# Patient Record
Sex: MALE | Race: WHITE | NOT HISPANIC OR LATINO | Employment: OTHER | URBAN - NONMETROPOLITAN AREA
[De-identification: names, ages, dates, MRNs, and addresses within clinical notes are randomized per-mention and may not be internally consistent; named-entity substitution may affect disease eponyms.]

---

## 2018-02-20 ENCOUNTER — TRANSCRIBE ORDERS (OUTPATIENT)
Dept: ADMINISTRATIVE | Facility: HOSPITAL | Age: 63
End: 2018-02-20

## 2018-02-20 DIAGNOSIS — R07.9 CHEST PAIN, UNSPECIFIED TYPE: Primary | ICD-10-CM

## 2018-02-21 ENCOUNTER — HOSPITAL ENCOUNTER (OUTPATIENT)
Dept: CARDIOLOGY | Facility: HOSPITAL | Age: 63
Discharge: HOME | End: 2018-02-21
Admitting: NURSE PRACTITIONER

## 2018-02-21 VITALS
HEIGHT: 68 IN | DIASTOLIC BLOOD PRESSURE: 69 MMHG | HEART RATE: 46 BPM | BODY MASS INDEX: 28.79 KG/M2 | SYSTOLIC BLOOD PRESSURE: 169 MMHG | WEIGHT: 190 LBS

## 2018-02-21 DIAGNOSIS — R07.9 CHEST PAIN, UNSPECIFIED TYPE: ICD-10-CM

## 2018-02-21 PROCEDURE — 93017 CV STRESS TEST TRACING ONLY: CPT

## 2018-02-21 PROCEDURE — 93018 CV STRESS TEST I&R ONLY: CPT | Performed by: INTERNAL MEDICINE

## 2018-02-21 RX ORDER — HYDROCODONE BITARTRATE AND ACETAMINOPHEN 5; 325 MG/1; MG/1
1 TABLET ORAL EVERY 6 HOURS PRN
COMMUNITY

## 2018-02-21 RX ORDER — OLMESARTAN MEDOXOMIL 20 MG/1
20 TABLET ORAL DAILY
COMMUNITY

## 2018-02-21 RX ORDER — SIMVASTATIN 20 MG
20 TABLET ORAL NIGHTLY
COMMUNITY

## 2018-02-21 RX ORDER — OMEPRAZOLE 40 MG/1
40 CAPSULE, DELAYED RELEASE ORAL DAILY
COMMUNITY

## 2018-02-22 LAB
BH CV STRESS BP STAGE 1: NORMAL
BH CV STRESS BP STAGE 2: NORMAL
BH CV STRESS DURATION MIN STAGE 1: 3
BH CV STRESS DURATION MIN STAGE 2: 3
BH CV STRESS DURATION MIN STAGE 3: 0
BH CV STRESS DURATION SEC STAGE 1: 0
BH CV STRESS DURATION SEC STAGE 2: 0
BH CV STRESS DURATION SEC STAGE 3: 20
BH CV STRESS GRADE STAGE 1: 10
BH CV STRESS GRADE STAGE 2: 12
BH CV STRESS GRADE STAGE 3: 14
BH CV STRESS HR STAGE 1: 80
BH CV STRESS HR STAGE 2: 97
BH CV STRESS HR STAGE 3: 108
BH CV STRESS METS STAGE 1: 5
BH CV STRESS METS STAGE 2: 7.5
BH CV STRESS METS STAGE 3: 10
BH CV STRESS PROTOCOL 1: NORMAL
BH CV STRESS RECOVERY BP: NORMAL MMHG
BH CV STRESS RECOVERY HR: 53 BPM
BH CV STRESS SPEED STAGE 1: 1.7
BH CV STRESS SPEED STAGE 2: 2.5
BH CV STRESS SPEED STAGE 3: 3.4
BH CV STRESS STAGE 1: 1
BH CV STRESS STAGE 2: 2
BH CV STRESS STAGE 3: 3
MAXIMAL PREDICTED HEART RATE: 156 BPM
PERCENT MAX PREDICTED HR: 69.23 %
STRESS BASELINE BP: NORMAL MMHG
STRESS BASELINE HR: 46 BPM
STRESS PERCENT HR: 81 %
STRESS POST ESTIMATED WORKLOAD: 10 METS
STRESS POST EXERCISE DUR MIN: 6 MIN
STRESS POST EXERCISE DUR SEC: 20 SEC
STRESS POST PEAK BP: NORMAL MMHG
STRESS POST PEAK HR: 108 BPM
STRESS TARGET HR: 133 BPM

## 2018-05-17 ENCOUNTER — OFFICE VISIT (OUTPATIENT)
Dept: GASTROENTEROLOGY | Facility: CLINIC | Age: 63
End: 2018-05-17

## 2018-05-17 VITALS
DIASTOLIC BLOOD PRESSURE: 80 MMHG | WEIGHT: 194 LBS | OXYGEN SATURATION: 99 % | HEART RATE: 53 BPM | SYSTOLIC BLOOD PRESSURE: 128 MMHG | BODY MASS INDEX: 29.4 KG/M2 | HEIGHT: 68 IN

## 2018-05-17 DIAGNOSIS — I10 HTN (HYPERTENSION), BENIGN: ICD-10-CM

## 2018-05-17 DIAGNOSIS — Z86.010 HX OF ADENOMATOUS COLONIC POLYPS: Primary | ICD-10-CM

## 2018-05-17 PROCEDURE — S0285 CNSLT BEFORE SCREEN COLONOSC: HCPCS | Performed by: CLINICAL NURSE SPECIALIST

## 2018-05-17 RX ORDER — ALPRAZOLAM 0.25 MG/1
TABLET ORAL
Refills: 0 | COMMUNITY
Start: 2018-04-19 | End: 2019-10-15 | Stop reason: ALTCHOICE

## 2018-05-17 RX ORDER — SODIUM, POTASSIUM,MAG SULFATES 17.5-3.13G
SOLUTION, RECONSTITUTED, ORAL ORAL
Qty: 2 BOTTLE | Refills: 0 | Status: SHIPPED | OUTPATIENT
Start: 2018-05-17 | End: 2019-10-15 | Stop reason: ALTCHOICE

## 2018-05-17 NOTE — PROGRESS NOTES
Ghulam Artis  1955      5/17/2018  Chief Complaint   Patient presents with   • Colonoscopy     Subjective   HPI  Ghulam Artis is a 62 y.o. male who presents as a referral for preventative maintenance. He has no complaints of nausea or vomiting. No change in bowels. No wt loss. No BRBPR. No melena. There is NO family hx for colon cancer. No abdominal pain.  Past Medical History:   Diagnosis Date   • Hyperlipidemia    • Hypertension      Past Surgical History:   Procedure Laterality Date   • COLONOSCOPY W/ POLYPECTOMY  05/13/2013    TUBULAR ADENOMA POLYP AT 60 CM, HYPERPLASTIC POLYP AT 30 CM, DIVERTICULOSIS SIGMOID COLON, RECALL 5YRS   • COLONOSCOPY W/ POLYPECTOMY  02/26/2008    HYPERPLASTIC POLYPS RECTUM, SCATTERED DIVERTICULA     Outpatient Prescriptions Marked as Taking for the 5/17/18 encounter (Office Visit) with LUDIVINA Christianson   Medication Sig Dispense Refill   • ALPRAZolam (XANAX) 0.25 MG tablet 1 TABLET BY MOUTH TWICE A DAY AS NEEDED FOR ANXIETY  0   • HYDROcodone-acetaminophen (NORCO) 5-325 MG per tablet Take 1 tablet by mouth Every 6 (Six) Hours As Needed.     • olmesartan (BENICAR) 20 MG tablet Take 20 mg by mouth Daily.     • omeprazole (priLOSEC) 40 MG capsule Take 40 mg by mouth Daily.     • simvastatin (ZOCOR) 20 MG tablet Take 20 mg by mouth Every Night.       No Known Allergies  Social History     Social History   • Marital status:      Spouse name: N/A   • Number of children: N/A   • Years of education: N/A     Occupational History   • Not on file.     Social History Main Topics   • Smoking status: Current Every Day Smoker     Packs/day: 2.00     Types: Cigarettes   • Smokeless tobacco: Never Used   • Alcohol use Yes      Comment: Occasional   • Drug use: Unknown   • Sexual activity: Not on file     Other Topics Concern   • Not on file     Social History Narrative   • No narrative on file     Family History   Problem Relation Age of Onset   • Heart disease Maternal  "Grandfather    • Heart attack Maternal Grandfather    • Colon cancer Neg Hx    • GI problems Neg Hx    • Colon polyps Neg Hx      Health Maintenance   Topic Date Due   • ANNUAL PHYSICAL  09/15/1958   • PNEUMOCOCCAL VACCINE (19-64 MEDIUM RISK) (1 of 1 - PPSV23) 09/15/1972   • TDAP/TD VACCINES (1 - Tdap) 09/15/1972   • HEPATITIS C SCREENING  02/07/2018   • ZOSTER VACCINE  02/07/2018   • LIPID PANEL  03/13/2018   • INFLUENZA VACCINE  08/01/2018   • COLONOSCOPY  05/13/2023       REVIEW OF SYSTEMS  General: well appearing, no fever chills or sweats, no unexplained wt loss  HEENT: no acute visual or hearing disturbances  Cardiovascular: No chest pain or palpitations  Pulmonary: No shortness of breath, coughing, wheezing or hemoptysis  : No burning, urgency, hematuria, or dysuria  Musculoskeletal: No joint pain or stiffness  Peripheral: no edema  Skin: No lesions or rashes  Neuro: No dizziness, headaches, stroke, syncope  Endocrine: No hot or cold intolerances  Hematological: No blood dyscrasias    Objective   Vitals:    05/17/18 0840   BP: 128/80   Pulse: 53   SpO2: 99%   Weight: 88 kg (194 lb)   Height: 172.7 cm (68\")     Body mass index is 29.5 kg/m².  Patient's Body mass index is 29.5 kg/m². BMI is above normal parameters. Recommendations include: nutrition counseling.      PHYSICAL EXAM  General: age appropriate well nourished well appearing, no acute distress  Head: normocephalic and atraumatic  Global assessment-supple  Neck-No JVD noted, no lymphadenopathy  Pulmonary-clear to auscultation bilaterally, normal respiratory effort  Cardiovascular-normal rate and rhythm, normal heart sounds, S1 and S2 noted  Abdomen-soft, non tender, non distended, normal bowel sounds all 4 quadrants, no hepatosplenomegaly noted  Extremities-No clubbing cyanosis or edema  Neuro-Non focal, converses appropriately, awake, alert, oriented    Assessment/Plan     Ghulam was seen today for colonoscopy.    Diagnoses and all orders for this " visit:    Hx of adenomatous colonic polyps  -     SUPREP BOWEL PREP KIT 17.5-3.13-1.6 GM/180ML solution oral solution; Take as directed by office instructions provided  -     Case Request; Standing  -     Implement Anesthesia Orders Day of Procedure; Standing  -     Obtain Informed Consent; Standing  -     Verify bowel prep was successful; Standing  -     Case Request    HTN (hypertension), benign  Comments:  Cont Benicar the am of procedure        COLONOSCOPY WITH ANESTHESIA (N/A)  Body mass index is 29.5 kg/m².    Patient instructions on prep prior to procedure provided to the patient.    All risks, benefits, alternatives, and indications of colonoscopy procedure have been discussed with the patient. Risks to include perforation of the colon requiring possible surgery or colostomy, risk of bleeding from biopsies or removal of colon tissue, possibility of missing a colon polyp or cancer, or adverse drug reaction.  Benefits to include the diagnosis and management of disease of the colon and rectum. Alternatives to include barium enema, radiographic evaluation, lab testing or no intervention. Pt verbalizes understanding and agrees.     Maria D Ludwig, APRN  2018  8:49 AM      IF YOU SMOKE OR USE TOBACCO PLEASE READ THE FOLLOWIN minutes reading provided    Why is smoking bad for me?  Smoking increases the risk of heart disease, lung disease, vascular disease, stroke, and cancer.     If you smoke, STOP!    If you would like more information on quitting smoking, please visit the [employer] website: [email or web address]   This link will provide additional resources including the QUIT line and the Beat the Pack support groups.     For more information:    Quit Now [place]  -QUIT-NOW  [email or web address]/en-[email or web address]    Obesity, Adult  Obesity is the condition of having too much total body fat. Being overweight or obese means that your weight is  greater than what is considered healthy for your body size. Obesity is determined by a measurement called BMI. BMI is an estimate of body fat and is calculated from height and weight. For adults, a BMI of 30 or higher is considered obese.  Obesity can eventually lead to other health concerns and major illnesses, including:  · Stroke.  · Coronary artery disease (CAD).  · Type 2 diabetes.  · Some types of cancer, including cancers of the colon, breast, uterus, and gallbladder.  · Osteoarthritis.  · High blood pressure (hypertension).  · High cholesterol.  · Sleep apnea.  · Gallbladder stones.  · Infertility problems.  What are the causes?  The main cause of obesity is taking in (consuming) more calories than your body uses for energy. Other factors that contribute to this condition may include:  · Being born with genes that make you more likely to become obese.  · Having a medical condition that causes obesity. These conditions include:  ¨ Hypothyroidism.  ¨ Polycystic ovarian syndrome (PCOS).  ¨ Binge-eating disorder.  ¨ Cushing syndrome.  · Taking certain medicines, such as steroids, antidepressants, and seizure medicines.  · Not being physically active (sedentary lifestyle).  · Living where there are limited places to exercise safely or buy healthy foods.  · Not getting enough sleep.  What increases the risk?  The following factors may increase your risk of this condition:  · Having a family history of obesity.  · Being a woman of -American descent.  · Being a man of  descent.  What are the signs or symptoms?  Having excessive body fat is the main symptom of this condition.  How is this diagnosed?  This condition may be diagnosed based on:  · Your symptoms.  · Your medical history.  · A physical exam. Your health care provider may measure:  ¨ Your BMI. If you are an adult with a BMI between 25 and less than 30, you are considered overweight. If you are an adult with a BMI of 30 or higher, you are  considered obese.  ¨ The distances around your hips and your waist (circumferences). These may be compared to each other to help diagnose your condition.  ¨ Your skinfold thickness. Your health care provider may gently pinch a fold of your skin and measure it.  How is this treated?  Treatment for this condition often includes changing your lifestyle. Treatment may include some or all of the following:  · Dietary changes. Work with your health care provider and a dietitian to set a weight-loss goal that is healthy and reasonable for you. Dietary changes may include eating:  ¨ Smaller portions. A portion size is the amount of a particular food that is healthy for you to eat at one time. This varies from person to person.  ¨ Low-calorie or low-fat options.  ¨ More whole grains, fruits, and vegetables.  · Regular physical activity. This may include aerobic activity (cardio) and strength training.  · Medicine to help you lose weight. Your health care provider may prescribe medicine if you are unable to lose 1 pound a week after 6 weeks of eating more healthily and doing more physical activity.  · Surgery. Surgical options may include gastric banding and gastric bypass. Surgery may be done if:  ¨ Other treatments have not helped to improve your condition.  ¨ You have a BMI of 40 or higher.  ¨ You have life-threatening health problems related to obesity.  Follow these instructions at home:     Eating and drinking     · Follow recommendations from your health care provider about what you eat and drink. Your health care provider may advise you to:  ¨ Limit fast foods, sweets, and processed snack foods.  ¨ Choose low-fat options, such as low-fat milk instead of whole milk.  ¨ Eat 5 or more servings of fruits or vegetables every day.  ¨ Eat at home more often. This gives you more control over what you eat.  ¨ Choose healthy foods when you eat out.  ¨ Learn what a healthy portion size is.  ¨ Keep low-fat snacks on  hand.  ¨ Avoid sugary drinks, such as soda, fruit juice, iced tea sweetened with sugar, and flavored milk.  ¨ Eat a healthy breakfast.  · Drink enough water to keep your urine clear or pale yellow.  · Do not go without eating for long periods of time (do not fast) or follow a fad diet. Fasting and fad diets can be unhealthy and even dangerous.  Physical Activity   · Exercise regularly, as told by your health care provider. Ask your health care provider what types of exercise are safe for you and how often you should exercise.  · Warm up and stretch before being active.  · Cool down and stretch after being active.  · Rest between periods of activity.  Lifestyle   · Limit the time that you spend in front of your TV, computer, or video game system.  · Find ways to reward yourself that do not involve food.  · Limit alcohol intake to no more than 1 drink a day for nonpregnant women and 2 drinks a day for men. One drink equals 12 oz of beer, 5 oz of wine, or 1½ oz of hard liquor.  General instructions   · Keep a weight loss journal to keep track of the food you eat and how much you exercise you get.  · Take over-the-counter and prescription medicines only as told by your health care provider.  · Take vitamins and supplements only as told by your health care provider.  · Consider joining a support group. Your health care provider may be able to recommend a support group.  · Keep all follow-up visits as told by your health care provider. This is important.  Contact a health care provider if:  · You are unable to meet your weight loss goal after 6 weeks of dietary and lifestyle changes.  This information is not intended to replace advice given to you by your health care provider. Make sure you discuss any questions you have with your health care provider.  Document Released: 01/25/2006 Document Revised: 05/22/2017 Document Reviewed: 10/05/2016  Elsevier Interactive Patient Education © 2017 [employer]

## 2018-05-21 PROBLEM — Z86.010 HX OF ADENOMATOUS COLONIC POLYPS: Status: ACTIVE | Noted: 2018-05-21

## 2018-09-24 ENCOUNTER — HOSPITAL ENCOUNTER (OUTPATIENT)
Facility: HOSPITAL | Age: 63
Setting detail: HOSPITAL OUTPATIENT SURGERY
Discharge: HOME | End: 2018-09-24
Attending: INTERNAL MEDICINE | Admitting: INTERNAL MEDICINE

## 2018-09-24 ENCOUNTER — ANESTHESIA EVENT (OUTPATIENT)
Dept: GASTROENTEROLOGY | Facility: HOSPITAL | Age: 63
End: 2018-09-24

## 2018-09-24 ENCOUNTER — ANESTHESIA (OUTPATIENT)
Dept: GASTROENTEROLOGY | Facility: HOSPITAL | Age: 63
End: 2018-09-24

## 2018-09-24 VITALS
RESPIRATION RATE: 16 BRPM | SYSTOLIC BLOOD PRESSURE: 169 MMHG | TEMPERATURE: 97.4 F | OXYGEN SATURATION: 99 % | BODY MASS INDEX: 29.1 KG/M2 | HEART RATE: 53 BPM | DIASTOLIC BLOOD PRESSURE: 76 MMHG | WEIGHT: 192 LBS | HEIGHT: 68 IN

## 2018-09-24 DIAGNOSIS — Z86.010 HX OF ADENOMATOUS COLONIC POLYPS: ICD-10-CM

## 2018-09-24 PROCEDURE — 25010000002 PROPOFOL 10 MG/ML EMULSION: Performed by: NURSE ANESTHETIST, CERTIFIED REGISTERED

## 2018-09-24 PROCEDURE — 45385 COLONOSCOPY W/LESION REMOVAL: CPT | Performed by: INTERNAL MEDICINE

## 2018-09-24 PROCEDURE — 88305 TISSUE EXAM BY PATHOLOGIST: CPT | Performed by: INTERNAL MEDICINE

## 2018-09-24 RX ORDER — SODIUM CHLORIDE 0.9 % (FLUSH) 0.9 %
3 SYRINGE (ML) INJECTION AS NEEDED
Status: DISCONTINUED | OUTPATIENT
Start: 2018-09-24 | End: 2018-09-24 | Stop reason: HOSPADM

## 2018-09-24 RX ORDER — SODIUM CHLORIDE 9 MG/ML
500 INJECTION, SOLUTION INTRAVENOUS CONTINUOUS PRN
Status: DISCONTINUED | OUTPATIENT
Start: 2018-09-24 | End: 2018-09-24 | Stop reason: HOSPADM

## 2018-09-24 RX ORDER — LIDOCAINE HYDROCHLORIDE 20 MG/ML
INJECTION, SOLUTION INFILTRATION; PERINEURAL AS NEEDED
Status: DISCONTINUED | OUTPATIENT
Start: 2018-09-24 | End: 2018-09-24 | Stop reason: SURG

## 2018-09-24 RX ORDER — PROPOFOL 10 MG/ML
VIAL (ML) INTRAVENOUS AS NEEDED
Status: DISCONTINUED | OUTPATIENT
Start: 2018-09-24 | End: 2018-09-24 | Stop reason: SURG

## 2018-09-24 RX ADMIN — PROPOFOL 50 MG: 10 INJECTION, EMULSION INTRAVENOUS at 07:53

## 2018-09-24 RX ADMIN — PROPOFOL 50 MG: 10 INJECTION, EMULSION INTRAVENOUS at 08:04

## 2018-09-24 RX ADMIN — LIDOCAINE HYDROCHLORIDE 100 MG: 20 INJECTION, SOLUTION INFILTRATION; PERINEURAL at 07:53

## 2018-09-24 RX ADMIN — PROPOFOL 50 MG: 10 INJECTION, EMULSION INTRAVENOUS at 07:59

## 2018-09-24 RX ADMIN — SODIUM CHLORIDE: 0.9 INJECTION, SOLUTION INTRAVENOUS at 07:30

## 2018-09-24 RX ADMIN — PROPOFOL 50 MG: 10 INJECTION, EMULSION INTRAVENOUS at 08:07

## 2018-09-24 NOTE — ANESTHESIA POSTPROCEDURE EVALUATION
Patient: Ghulam Artis    Procedure Summary     Date:  09/24/18 Room / Location:  [facility] ENDOSCOPY 2 / [facility]    Anesthesia Start:  0750 Anesthesia Stop:  0810    Procedure:  COLONOSCOPY WITH ANESTHESIA (N/A ) Diagnosis:       Hx of adenomatous colonic polyps      (Hx of adenomatous colonic polyps [Z86.010])    Surgeon:  Zack Moser MD Provider:  Radha Lyman CRNA    Anesthesia Type:  general ASA Status:  2          Anesthesia Type: general  Last vitals  BP   152/76 (09/24/18 0654)   Temp   97.4 °F (36.3 °C) (09/24/18 0654)   Pulse   57 (09/24/18 0654)   Resp   18 (09/24/18 0654)     SpO2   97 % (09/24/18 0654)     Post Anesthesia Care and Evaluation    Patient location during evaluation: PHASE II  Patient participation: complete - patient participated  Level of consciousness: awake and alert  Pain score: 0  Pain management: adequate  Airway patency: patent  Anesthetic complications: No anesthetic complications    Cardiovascular status: acceptable and stable  Respiratory status: acceptable and unassisted  Hydration status: stable

## 2018-09-24 NOTE — ANESTHESIA PREPROCEDURE EVALUATION
Anesthesia Evaluation     Patient summary reviewed   no history of anesthetic complications:  NPO Solid Status: > 8 hours  NPO Liquid Status: < 2 hours           Airway   Mallampati: II  TM distance: >3 FB  Neck ROM: full  No difficulty expected  Dental - normal exam     Pulmonary    (+) a smoker Current Smoked day of surgery, sleep apnea on CPAP,   (-) asthma  Cardiovascular     (+) hypertension, hyperlipidemia,   (-) past MI, CAD, cardiac stents    ROS comment: 02/2018 Treadmill stress test- no evidence of ischemia    Neuro/Psych  (-) seizures, TIA, CVA  GI/Hepatic/Renal/Endo    (+)  GERD,    (-) liver disease, no renal disease, diabetes    Musculoskeletal     Abdominal    Substance History      OB/GYN          Other                        Anesthesia Plan    ASA 2     general   total IV anesthesia  intravenous induction   Anesthetic plan, all risks, benefits, and alternatives have been provided, discussed and informed consent has been obtained with: patient.

## 2018-09-24 NOTE — H&P
[facility] Gastroenterology  Pre Procedure History & Physical    Chief Complaint:   History of polyps    Subjective     HPI:   Here for colonoscopy.  History of polyps    Past Medical History:   Past Medical History:   Diagnosis Date   • GERD (gastroesophageal reflux disease)    • Hyperlipidemia    • Hypertension    • Sleep apnea     cpap at        Past Surgical History:  Past Surgical History:   Procedure Laterality Date   • COLONOSCOPY W/ POLYPECTOMY  05/13/2013    TUBULAR ADENOMA POLYP AT 60 CM, HYPERPLASTIC POLYP AT 30 CM, DIVERTICULOSIS SIGMOID COLON, RECALL 5YRS   • COLONOSCOPY W/ POLYPECTOMY  02/26/2008    HYPERPLASTIC POLYPS RECTUM, SCATTERED DIVERTICULA       Family History:  Family History   Problem Relation Age of Onset   • Heart disease Maternal Grandfather    • Heart attack Maternal Grandfather    • Colon cancer Neg Hx    • GI problems Neg Hx    • Colon polyps Neg Hx        Social History:   reports that he has been smoking Cigarettes.  He has a 80.00 pack-year smoking history. He has never used smokeless tobacco. He reports that he drinks alcohol. He reports that he does not use drugs.    Medications:   Prior to Admission medications    Medication Sig Start Date End Date Taking? Authorizing Provider   ALPRAZolam (XANAX) 0.25 MG tablet 1 TABLET BY MOUTH TWICE A DAY AS NEEDED FOR ANXIETY 4/19/18  Yes Lizette Zaragoza MD   HYDROcodone-acetaminophen (NORCO) 5-325 MG per tablet Take 1 tablet by mouth Every 6 (Six) Hours As Needed.   Yes Lizette Zaragoza MD   olmesartan (BENICAR) 20 MG tablet Take 20 mg by mouth Daily.   Yes Lizette Zaragoza MD   omeprazole (priLOSEC) 40 MG capsule Take 40 mg by mouth Daily.   Yes Lizette Zaragoza MD   simvastatin (ZOCOR) 20 MG tablet Take 20 mg by mouth Every Night.   Yes Lizette Zaragoza MD   SUPREP BOWEL PREP KIT 17.5-3.13-1.6 GM/180ML solution oral solution Take as directed by office instructions provided 5/17/18  Yes Maria D Ludwig,  "APRN       Allergies:  Patient has no known allergies.    Objective     Blood pressure 152/76, pulse 57, temperature 97.4 °F (36.3 °C), temperature source Temporal Artery , resp. rate 18, height 172.7 cm (68\"), weight 87.1 kg (192 lb), SpO2 97 %.    Physical Exam   Constitutional: Pt is oriented to person, place, and in no distress.   HENT: Mouth/Throat: Oropharynx is clear.   Cardiovascular: Normal rate, regular rhythm.    Pulmonary/Chest: Effort normal. No respiratory distress. No  wheezes.   Abdominal: Soft. Non-distended.  Skin: Skin is warm and dry.   Psychiatric: Mood, memory, affect and judgment appear normal.     Assessment/Plan     Diagnosis:  History of polyps    Anticipated Surgical Procedure:    Proceed with colonoscopy as scheduled    The following major R/B/A were discussed with the patient, however the list is not all inclusive . Risk:  Bleeding (immediate and delayed), perforation (rupture or tear), reaction to medication, missed lesion/cancer, pain during the procedure, infection, need for surgery, need for ostomy, need for mechanical ventilation (breathing machine), death.  Benefits: removal of polyp/tissue, burn/clip/or inject to stop bleeding, removal of foreign body, dilate any stricture.  Alternatives: Xray or CT, surgery, do nothing with associated risk   The patient was given time to ask question and received explanation, and agrees to proceed as per History and Physical.   No guarantee given or expressed.    EMR Dragon/transcription disclaimer: Much of this encounter note is an electronic transcription/translation of spoken language to printed text.  The electronic translation of spoken language may permit erroneous, or at times, nonsensical words or phrases to be inadvertently transcribed.  Although I have reviewed the note for such errors, some may still exist.    Zack Moser MD  7:51 AM  9/24/2018    "

## 2018-09-25 LAB
CYTO UR: NORMAL
LAB AP CASE REPORT: NORMAL
LAB AP CLINICAL INFORMATION: NORMAL
PATH REPORT.FINAL DX SPEC: NORMAL
PATH REPORT.GROSS SPEC: NORMAL

## 2019-09-16 ENCOUNTER — TRANSCRIBE ORDERS (OUTPATIENT)
Dept: ADMINISTRATIVE | Facility: HOSPITAL | Age: 64
End: 2019-09-16

## 2019-09-16 ENCOUNTER — APPOINTMENT (OUTPATIENT)
Dept: LAB | Facility: HOSPITAL | Age: 64
End: 2019-09-16

## 2019-09-16 DIAGNOSIS — Z00.00 ENCOUNTER FOR GENERAL ADULT MEDICAL EXAMINATION WITHOUT ABNORMAL FINDINGS: ICD-10-CM

## 2019-09-16 DIAGNOSIS — E78.00 PURE HYPERCHOLESTEROLEMIA: ICD-10-CM

## 2019-09-16 DIAGNOSIS — Z12.5 ENCOUNTER FOR SCREENING FOR MALIGNANT NEOPLASM OF PROSTATE: ICD-10-CM

## 2019-09-16 DIAGNOSIS — E78.5 HYPERLIPIDEMIA, UNSPECIFIED HYPERLIPIDEMIA TYPE: ICD-10-CM

## 2019-09-16 DIAGNOSIS — R73.9 HYPERGLYCEMIA: Primary | ICD-10-CM

## 2019-09-16 DIAGNOSIS — I10 ESSENTIAL (PRIMARY) HYPERTENSION: ICD-10-CM

## 2019-09-16 LAB
ALBUMIN SERPL-MCNC: 4.5 G/DL (ref 3.5–5.2)
ALBUMIN/GLOB SERPL: 1.7 G/DL
ALP SERPL-CCNC: 57 U/L (ref 39–117)
ALT SERPL W P-5'-P-CCNC: 33 U/L (ref 1–41)
ANION GAP SERPL CALCULATED.3IONS-SCNC: 10.5 MMOL/L (ref 5–15)
AST SERPL-CCNC: 21 U/L (ref 1–40)
BASOPHILS # BLD AUTO: 0.03 10*3/MM3 (ref 0–0.2)
BASOPHILS NFR BLD AUTO: 0.6 % (ref 0–1.5)
BILIRUB SERPL-MCNC: 0.4 MG/DL (ref 0.2–1.2)
BILIRUB UR QL STRIP: NEGATIVE
BUN BLD-MCNC: 15 MG/DL (ref 8–23)
BUN/CREAT SERPL: 15.8 (ref 7–25)
CALCIUM SPEC-SCNC: 9.4 MG/DL (ref 8.6–10.5)
CHLORIDE SERPL-SCNC: 100 MMOL/L (ref 98–107)
CHOLEST SERPL-MCNC: 194 MG/DL (ref 0–200)
CLARITY UR: CLEAR
CO2 SERPL-SCNC: 26.5 MMOL/L (ref 22–29)
COLOR UR: YELLOW
CREAT BLD-MCNC: 0.95 MG/DL (ref 0.76–1.27)
DEPRECATED RDW RBC AUTO: 43.6 FL (ref 37–54)
EOSINOPHIL # BLD AUTO: 0.14 10*3/MM3 (ref 0–0.4)
EOSINOPHIL NFR BLD AUTO: 2.7 % (ref 0.3–6.2)
ERYTHROCYTE [DISTWIDTH] IN BLOOD BY AUTOMATED COUNT: 12.2 % (ref 12.3–15.4)
GFR SERPL CREATININE-BSD FRML MDRD: 79 ML/MIN/1.73
GLOBULIN UR ELPH-MCNC: 2.7 GM/DL
GLUCOSE BLD-MCNC: 126 MG/DL (ref 65–99)
GLUCOSE UR STRIP-MCNC: NEGATIVE MG/DL
HBA1C MFR BLD: 6.8 % (ref 4.8–5.6)
HCT VFR BLD AUTO: 46.9 % (ref 37.5–51)
HDLC SERPL-MCNC: 33 MG/DL (ref 40–60)
HGB BLD-MCNC: 15.1 G/DL (ref 13–17.7)
HGB UR QL STRIP.AUTO: NEGATIVE
IMM GRANULOCYTES # BLD AUTO: 0.01 10*3/MM3 (ref 0–0.05)
IMM GRANULOCYTES NFR BLD AUTO: 0.2 % (ref 0–0.5)
KETONES UR QL STRIP: NEGATIVE
LDLC SERPL CALC-MCNC: 110 MG/DL (ref 0–100)
LDLC/HDLC SERPL: 3.32 {RATIO}
LEUKOCYTE ESTERASE UR QL STRIP.AUTO: NEGATIVE
LYMPHOCYTES # BLD AUTO: 1.88 10*3/MM3 (ref 0.7–3.1)
LYMPHOCYTES NFR BLD AUTO: 36.4 % (ref 19.6–45.3)
MCH RBC QN AUTO: 31.3 PG (ref 26.6–33)
MCHC RBC AUTO-ENTMCNC: 32.2 G/DL (ref 31.5–35.7)
MCV RBC AUTO: 97.1 FL (ref 79–97)
MONOCYTES # BLD AUTO: 0.44 10*3/MM3 (ref 0.1–0.9)
MONOCYTES NFR BLD AUTO: 8.5 % (ref 5–12)
NEUTROPHILS # BLD AUTO: 2.67 10*3/MM3 (ref 1.7–7)
NEUTROPHILS NFR BLD AUTO: 51.6 % (ref 42.7–76)
NITRITE UR QL STRIP: NEGATIVE
NRBC BLD AUTO-RTO: 0 /100 WBC (ref 0–0.2)
PH UR STRIP.AUTO: 6 [PH] (ref 5–8)
PLATELET # BLD AUTO: 183 10*3/MM3 (ref 140–450)
PMV BLD AUTO: 9.8 FL (ref 6–12)
POTASSIUM BLD-SCNC: 4.5 MMOL/L (ref 3.5–5.2)
PROT SERPL-MCNC: 7.2 G/DL (ref 6–8.5)
PROT UR QL STRIP: NEGATIVE
PSA SERPL-MCNC: 0.85 NG/ML (ref 0–4)
RBC # BLD AUTO: 4.83 10*6/MM3 (ref 4.14–5.8)
SODIUM BLD-SCNC: 137 MMOL/L (ref 136–145)
SP GR UR STRIP: 1.02 (ref 1–1.03)
TRIGL SERPL-MCNC: 257 MG/DL (ref 0–150)
UROBILINOGEN UR QL STRIP: NORMAL
VLDLC SERPL-MCNC: 51.4 MG/DL (ref 5–40)
WBC NRBC COR # BLD: 5.17 10*3/MM3 (ref 3.4–10.8)

## 2019-09-16 PROCEDURE — 80053 COMPREHEN METABOLIC PANEL: CPT | Performed by: NURSE PRACTITIONER

## 2019-09-16 PROCEDURE — G0103 PSA SCREENING: HCPCS | Performed by: NURSE PRACTITIONER

## 2019-09-16 PROCEDURE — 80061 LIPID PANEL: CPT | Performed by: NURSE PRACTITIONER

## 2019-09-16 PROCEDURE — 36415 COLL VENOUS BLD VENIPUNCTURE: CPT | Performed by: NURSE PRACTITIONER

## 2019-09-16 PROCEDURE — 85025 COMPLETE CBC W/AUTO DIFF WBC: CPT | Performed by: NURSE PRACTITIONER

## 2019-09-16 PROCEDURE — 81003 URINALYSIS AUTO W/O SCOPE: CPT | Performed by: NURSE PRACTITIONER

## 2019-09-16 PROCEDURE — 83036 HEMOGLOBIN GLYCOSYLATED A1C: CPT | Performed by: NURSE PRACTITIONER

## 2019-10-15 ENCOUNTER — OFFICE VISIT (OUTPATIENT)
Dept: PULMONOLOGY | Facility: CLINIC | Age: 64
End: 2019-10-15

## 2019-10-15 VITALS
HEART RATE: 46 BPM | WEIGHT: 206 LBS | HEIGHT: 68 IN | OXYGEN SATURATION: 98 % | BODY MASS INDEX: 31.22 KG/M2 | DIASTOLIC BLOOD PRESSURE: 82 MMHG | SYSTOLIC BLOOD PRESSURE: 142 MMHG

## 2019-10-15 DIAGNOSIS — R00.1 BRADYCARDIA: ICD-10-CM

## 2019-10-15 DIAGNOSIS — G47.33 OSA (OBSTRUCTIVE SLEEP APNEA): ICD-10-CM

## 2019-10-15 DIAGNOSIS — R04.2 HEMOPTYSIS: Primary | ICD-10-CM

## 2019-10-15 DIAGNOSIS — J98.4 RESTRICTIVE LUNG DISEASE: ICD-10-CM

## 2019-10-15 DIAGNOSIS — F17.210 CIGARETTE NICOTINE DEPENDENCE WITHOUT COMPLICATION: ICD-10-CM

## 2019-10-15 DIAGNOSIS — E66.3 OVERWEIGHT: ICD-10-CM

## 2019-10-15 PROCEDURE — 94729 DIFFUSING CAPACITY: CPT | Performed by: INTERNAL MEDICINE

## 2019-10-15 PROCEDURE — 94010 BREATHING CAPACITY TEST: CPT | Performed by: INTERNAL MEDICINE

## 2019-10-15 PROCEDURE — 99204 OFFICE O/P NEW MOD 45 MIN: CPT | Performed by: INTERNAL MEDICINE

## 2019-10-15 PROCEDURE — 94727 GAS DIL/WSHOT DETER LNG VOL: CPT | Performed by: INTERNAL MEDICINE

## 2019-10-15 RX ORDER — AZELASTINE HYDROCHLORIDE 137 UG/1
SPRAY, METERED NASAL
COMMUNITY
Start: 2019-09-23

## 2019-10-15 RX ORDER — FLUTICASONE PROPIONATE 50 MCG
SPRAY, SUSPENSION (ML) NASAL
COMMUNITY
Start: 2019-09-23

## 2019-10-15 RX ORDER — VARENICLINE TARTRATE 1 MG/1
TABLET, FILM COATED ORAL
COMMUNITY
Start: 2019-09-23

## 2019-10-15 NOTE — PATIENT INSTRUCTIONS
In view of the patient's recent episodes of hemoptysis, what appears to be some restriction on pulmonary functions, and his tobacco use history, will get a chest CT at Dr. Moreno[facility] and then see him back in about a week and a half ago the results the above and determine the need for further work-up.  He was counseled regarding smoking cessation.

## 2019-10-15 NOTE — PROGRESS NOTES
Subjective   Ghulam Artis is a 64 y.o. male.     Chief Complaint   Patient presents with   • Coughing Up Blood      No My Sticky Note on file.    History of Present Illness   The patient is referred by Dr. Daren Castillo for evaluation of hemoptysis.  He is had 2 occasions in the last several weeks would have a very deep cough then coughed up some bloody phlegm.  He states he has not done that over the last week or so.  He does have a long og chronic tobacco use and continues to smoke.  He did work as a  in the past and was around some of the chemical plants and states he was occasionally in these plans but not on a routine basis.  Recent chest x-ray at Dr. Moreno[facility] was unremarkable.  He does have sleep apnea for which she is on a positive airway pressure device.    Medical/Family/Social History   has a past medical history of GERD (gastroesophageal reflux disease), Hyperlipidemia, Hypertension, and Sleep apnea.   has a past surgical history that includes Colonoscopy w/ polypectomy (05/13/2013); Colonoscopy w/ polypectomy (02/26/2008); and Colonoscopy (N/A, 9/24/2018).  family history includes Heart attack in his maternal grandfather; Heart disease in his maternal grandfather.   reports that he has been smoking cigarettes.  He has a 80.00 pack-year smoking history. He has never used smokeless tobacco. He reports that he drinks alcohol. He reports that he does not use drugs.  No Known Allergies  Medications    Current Outpatient Medications:   •  Azelastine HCl 137 MCG/SPRAY solution, , Disp: , Rfl:   •  CHANTIX 1 MG tablet, , Disp: , Rfl:   •  fluticasone (FLONASE) 50 MCG/ACT nasal spray, , Disp: , Rfl:   •  HYDROcodone-acetaminophen (NORCO) 5-325 MG per tablet, Take 1 tablet by mouth Every 6 (Six) Hours As Needed., Disp: , Rfl:   •  olmesartan (BENICAR) 20 MG tablet, Take 20 mg by mouth Daily., Disp: , Rfl:   •  omeprazole (priLOSEC) 40 MG capsule, Take 40 mg by mouth Daily., Disp: , Rfl:   •   "simvastatin (ZOCOR) 20 MG tablet, Take 20 mg by mouth Every Night., Disp: , Rfl:     Review of Systems   Constitutional: Negative for chills and fever.   HENT: Negative for congestion.    Eyes: Negative for visual disturbance.   Respiratory: Positive for cough. Negative for shortness of breath.         He has had some hemoptysis.   Cardiovascular: Negative for chest pain.   Gastrointestinal: Negative for diarrhea, nausea and vomiting.   Genitourinary: Negative for difficulty urinating.   Musculoskeletal: Negative for arthralgias.   Skin: Negative for rash.   Neurological: Negative for dizziness and speech difficulty.   Psychiatric/Behavioral: Positive for sleep disturbance. The patient is not nervous/anxious.      ------------------------------------  Objective   /82   Pulse (!) 46   Ht 172.7 cm (68\")   Wt 93.4 kg (206 lb)   SpO2 98% Comment: RA  BMI 31.32 kg/m²   Physical Exam   Constitutional: He is oriented to person, place, and time. He appears well-developed.   He is a slightly overweight white male who appears in no acute distress.   HENT:   Head: Normocephalic and atraumatic.   He has some crowding of the posterior pharynx.   Eyes: EOM are normal. Pupils are equal, round, and reactive to light.   Neck: Normal range of motion. Neck supple.   Cardiovascular: Regular rhythm and normal heart sounds.   He was somewhat bradycardic with a pulse in the range of 46-50.  He is asymptomatic related to this.   Pulmonary/Chest: Effort normal and breath sounds normal.   Abdominal: Soft.   Musculoskeletal: Normal range of motion.   Neurological: He is alert and oriented to person, place, and time.   Skin: Skin is warm and dry.   Psychiatric: He has a normal mood and affect.   Nursing note and vitals reviewed.          Pulmonary Functions Testing Results:Pulmonary Function Test  Performed by: Isidoro Faye MD  Authorized by: Isidoro Faye MD      Pre Drug    FVC: 62%   FEV1: 58%   FEF 25-75%: " 43%   FEV1/FVC: 73.95%   T%   RV: 105%   DLCO: 74%   D/VAsb: 97%              My interpretation of PFT:  1.  Spirometry is consistent with a moderate restrictive ventilatory defect with coexisting small airways disease.  2.  Lung volumes reveal low normal total lung capacity with a decrease in vital capacity and expiratory reserve volume.  3.  There is a mild diffusion impairment which when corrected for alveolar volume is normalized.  Assessment/Plan   Ghulam was seen today for coughing up blood.    Diagnoses and all orders for this visit:    Hemoptysis  -     Pulmonary Function Test  -     CT Chest Without Contrast; Future    Restrictive lung disease  -     CT Chest Without Contrast; Future    MACRINA (obstructive sleep apnea)    Cigarette nicotine dependence without complication    Overweight      Patient's Body mass index is 31.32 kg/m². BMI is above normal parameters. Recommendations include: referral to primary care.      In view of his hemoptysis and the restrictive pattern on spirometry we will get a chest CT at Dr. Moreno[facility] and have him return in a few weeks ago results the above.  He would also be candidate for yearly screening chest CTs based on his smoking history.  We will determine the need for any basic work-up such as bronchoscopy depending on results of his chest CT and he was counseled regarding smoking cessation and states he is cutting back.

## 2019-10-15 NOTE — PROCEDURES
Pulmonary Function Test  Performed by: Isidoro Faye MD  Authorized by: Isidoro Faye MD      Pre Drug    FVC: 62%   FEV1: 58%   FEF 25-75%: 43%   FEV1/FVC: 73.95%   T%   RV: 105%   DLCO: 74%   D/VAsb: 97%

## 2019-10-22 DIAGNOSIS — R04.2 HEMOPTYSIS: ICD-10-CM

## 2019-10-22 DIAGNOSIS — J98.4 RESTRICTIVE LUNG DISEASE: ICD-10-CM

## 2019-10-24 ENCOUNTER — OFFICE VISIT (OUTPATIENT)
Dept: PULMONOLOGY | Facility: CLINIC | Age: 64
End: 2019-10-24

## 2019-10-24 VITALS
SYSTOLIC BLOOD PRESSURE: 130 MMHG | DIASTOLIC BLOOD PRESSURE: 70 MMHG | HEIGHT: 68 IN | WEIGHT: 203 LBS | OXYGEN SATURATION: 98 % | BODY MASS INDEX: 30.77 KG/M2 | HEART RATE: 48 BPM

## 2019-10-24 DIAGNOSIS — F17.210 CIGARETTE NICOTINE DEPENDENCE WITHOUT COMPLICATION: ICD-10-CM

## 2019-10-24 DIAGNOSIS — G47.33 OSA (OBSTRUCTIVE SLEEP APNEA): ICD-10-CM

## 2019-10-24 DIAGNOSIS — R04.2 HEMOPTYSIS: ICD-10-CM

## 2019-10-24 DIAGNOSIS — E66.3 OVERWEIGHT: ICD-10-CM

## 2019-10-24 DIAGNOSIS — J98.4 RESTRICTIVE LUNG DISEASE: Primary | ICD-10-CM

## 2019-10-24 DIAGNOSIS — R00.1 BRADYCARDIA: ICD-10-CM

## 2019-10-24 PROCEDURE — 99214 OFFICE O/P EST MOD 30 MIN: CPT | Performed by: INTERNAL MEDICINE

## 2019-10-24 NOTE — PATIENT INSTRUCTIONS
The patient's chest CT was unremarkable except for some hepatic steatosis.  I encouraged him as to the importance of exercise and also compliance with his positive airway pressure device.  He does use this on a routine basis. He would be a candidate for a follow-up chest CT for lung cancer screening purposes in 1 year.  He will follow-up with Dr. Castillo on this and return to see me as needed.

## 2019-10-24 NOTE — PROGRESS NOTES
Subjective   Ghulam Artis is a 64 y.o. male.     Chief Complaint   Patient presents with   • Coughing Up Blood      No My Sticky Note on file.    History of Present Illness   The patient returns today for follow-up.  He is having no acute respiratory tract symptomatology with no further hemoptysis.  His chest CT was unremarkable.  I did advise the patient that I would still recommend a yearly chest CT based on his smoking history.  If you were to have recurrent hemoptysis I think bronchoscopy be reasonable.  I told him we can see him back in 1 year with the chest CT but he will follow-up with Dr. Castillo on this.  I did encourage him to work on smoking cessation which he is doing and also to continue using his positive airway pressure device for his sleep apnea routinely which she is also doing.    Medical/Family/Social History   has a past medical history of GERD (gastroesophageal reflux disease), Hyperlipidemia, Hypertension, and Sleep apnea.   has a past surgical history that includes Colonoscopy w/ polypectomy (05/13/2013); Colonoscopy w/ polypectomy (02/26/2008); and Colonoscopy (N/A, 9/24/2018).  family history includes Heart attack in his maternal grandfather; Heart disease in his maternal grandfather.   reports that he has been smoking cigarettes.  He has a 80.00 pack-year smoking history. He has never used smokeless tobacco. He reports that he drinks alcohol. He reports that he does not use drugs.  No Known Allergies  Medications    Current Outpatient Medications:   •  Azelastine HCl 137 MCG/SPRAY solution, , Disp: , Rfl:   •  CHANTIX 1 MG tablet, , Disp: , Rfl:   •  fluticasone (FLONASE) 50 MCG/ACT nasal spray, , Disp: , Rfl:   •  HYDROcodone-acetaminophen (NORCO) 5-325 MG per tablet, Take 1 tablet by mouth Every 6 (Six) Hours As Needed., Disp: , Rfl:   •  olmesartan (BENICAR) 20 MG tablet, Take 20 mg by mouth Daily., Disp: , Rfl:   •  omeprazole (priLOSEC) 40 MG capsule, Take 40 mg by mouth Daily.,  "Disp: , Rfl:   •  simvastatin (ZOCOR) 20 MG tablet, Take 20 mg by mouth Every Night., Disp: , Rfl:     Review of Systems  Constitutional: Negative for chills and fever.   HENT: Negative for congestion.    Eyes: Negative for visual disturbance.   Respiratory: Positive for cough. Negative for shortness of breath.         His hemoptysis has resolved.  Cardiovascular: Negative for chest pain.   Gastrointestinal: Negative for diarrhea, nausea and vomiting.   Genitourinary: Negative for difficulty urinating.   Musculoskeletal: Negative for arthralgias.   Skin: Negative for rash.   Neurological: Negative for dizziness and speech difficulty.   Psychiatric/Behavioral: Positive for sleep disturbance. The patient is not nervous/anxious.    Objective   /70   Pulse (!) 48   Ht 172.7 cm (68\")   Wt 92.1 kg (203 lb)   SpO2 98% Comment: RA  BMI 30.87 kg/m²   Physical Exam  Constitutional: He is oriented to person, place, and time. He appears well-developed.   He is a slightly overweight white male who appears in no acute distress.   HENT:   Head: Normocephalic and atraumatic.   He has some crowding of the posterior pharynx.   Eyes: EOM are normal. Pupils are equal, round, and reactive to light.   Neck: Normal range of motion. Neck supple.   Cardiovascular: Regular rhythm and normal heart sounds.   He was somewhat bradycardic with a pulse in the range of 46-50.  He is asymptomatic related to this.   Pulmonary/Chest: Effort normal and breath sounds normal.   Abdominal: Soft.   Musculoskeletal: Normal range of motion.   Neurological: He is alert and oriented to person, place, and time.   Skin: Skin is warm and dry.   Psychiatric: He has a normal mood and affect.   Nursing note and vitals reviewed.    Pulmonary Functions Testing Results:  No results found for: FEV1, FVC, FVK9DOW, TLC, DLCO     Assessment/Plan   Ghulam was seen today for coughing up blood.    Diagnoses and all orders for this visit:    Restrictive lung " disease    Hemoptysis    MACRINA (obstructive sleep apnea)    Bradycardia    Cigarette nicotine dependence without complication    Overweight      Patient's Body mass index is 30.87 kg/m². BMI is above normal parameters. Recommendations include: referral to primary care.      Roxana is going to continue to work on smoking cessation and continue his positive airway pressure device for his sleep apnea.  He does have recurrent hemoptysis that I think bronchoscopy be reasonable and he can contact office if he has such problems.  Otherwise I would recommend a repeat CT for lung cancer screening purposes in 1 year and he will follow-up with Dr. Castillo on this and return to see me as needed.

## 2021-03-09 ENCOUNTER — IMMUNIZATION (OUTPATIENT)
Dept: VACCINE CLINIC | Facility: HOSPITAL | Age: 66
End: 2021-03-09

## 2021-03-09 PROCEDURE — 0011A: CPT | Performed by: OBSTETRICS & GYNECOLOGY

## 2021-03-09 PROCEDURE — 91301 HC SARSCO02 VAC 100MCG/0.5ML IM: CPT | Performed by: OBSTETRICS & GYNECOLOGY

## 2021-04-06 ENCOUNTER — IMMUNIZATION (OUTPATIENT)
Dept: VACCINE CLINIC | Facility: HOSPITAL | Age: 66
End: 2021-04-06

## 2021-04-06 PROCEDURE — 91301 HC SARSCO02 VAC 100MCG/0.5ML IM: CPT | Performed by: OBSTETRICS & GYNECOLOGY

## 2021-04-06 PROCEDURE — 0012A: CPT | Performed by: OBSTETRICS & GYNECOLOGY

## 2021-07-06 ENCOUNTER — OFFICE VISIT (OUTPATIENT)
Dept: CARDIOLOGY CLINIC | Age: 66
End: 2021-07-06
Payer: MEDICARE

## 2021-07-06 VITALS
DIASTOLIC BLOOD PRESSURE: 76 MMHG | HEIGHT: 68 IN | HEART RATE: 52 BPM | BODY MASS INDEX: 31.67 KG/M2 | SYSTOLIC BLOOD PRESSURE: 140 MMHG | WEIGHT: 209 LBS | OXYGEN SATURATION: 99 %

## 2021-07-06 DIAGNOSIS — R00.1 BRADYCARDIA: ICD-10-CM

## 2021-07-06 DIAGNOSIS — I10 ESSENTIAL HYPERTENSION: ICD-10-CM

## 2021-07-06 DIAGNOSIS — R06.09 DOE (DYSPNEA ON EXERTION): ICD-10-CM

## 2021-07-06 DIAGNOSIS — R07.89 OTHER CHEST PAIN: Primary | ICD-10-CM

## 2021-07-06 DIAGNOSIS — E78.5 HYPERLIPIDEMIA, UNSPECIFIED HYPERLIPIDEMIA TYPE: ICD-10-CM

## 2021-07-06 PROCEDURE — G8427 DOCREV CUR MEDS BY ELIG CLIN: HCPCS | Performed by: NURSE PRACTITIONER

## 2021-07-06 PROCEDURE — 1123F ACP DISCUSS/DSCN MKR DOCD: CPT | Performed by: NURSE PRACTITIONER

## 2021-07-06 PROCEDURE — 93000 ELECTROCARDIOGRAM COMPLETE: CPT | Performed by: NURSE PRACTITIONER

## 2021-07-06 PROCEDURE — 1036F TOBACCO NON-USER: CPT | Performed by: NURSE PRACTITIONER

## 2021-07-06 PROCEDURE — G8417 CALC BMI ABV UP PARAM F/U: HCPCS | Performed by: NURSE PRACTITIONER

## 2021-07-06 PROCEDURE — 99204 OFFICE O/P NEW MOD 45 MIN: CPT | Performed by: NURSE PRACTITIONER

## 2021-07-06 PROCEDURE — 4040F PNEUMOC VAC/ADMIN/RCVD: CPT | Performed by: NURSE PRACTITIONER

## 2021-07-06 PROCEDURE — 3017F COLORECTAL CA SCREEN DOC REV: CPT | Performed by: NURSE PRACTITIONER

## 2021-07-06 RX ORDER — HYDROCHLOROTHIAZIDE 25 MG/1
25 TABLET ORAL DAILY
COMMUNITY
End: 2021-08-25 | Stop reason: SINTOL

## 2021-07-06 RX ORDER — OLMESARTAN MEDOXOMIL 40 MG/1
40 TABLET ORAL DAILY
COMMUNITY

## 2021-07-06 RX ORDER — ASPIRIN 81 MG/1
81 TABLET ORAL DAILY
COMMUNITY

## 2021-07-06 RX ORDER — AMLODIPINE BESYLATE 5 MG/1
5 TABLET ORAL DAILY
Qty: 30 TABLET | Refills: 5 | Status: SHIPPED | OUTPATIENT
Start: 2021-07-06 | End: 2022-04-26

## 2021-07-06 RX ORDER — SIMVASTATIN 20 MG
20 TABLET ORAL NIGHTLY
COMMUNITY

## 2021-07-06 RX ORDER — VARENICLINE TARTRATE 0.5 MG/1
0.5 TABLET, FILM COATED ORAL 2 TIMES DAILY
COMMUNITY
End: 2022-06-07

## 2021-07-06 RX ORDER — GLIMEPIRIDE 2 MG/1
2 TABLET ORAL 2 TIMES DAILY
COMMUNITY

## 2021-07-06 RX ORDER — OMEPRAZOLE 20 MG/1
20 CAPSULE, DELAYED RELEASE ORAL DAILY
COMMUNITY

## 2021-07-06 NOTE — PROGRESS NOTES
Dear Dr. Karen Colin MD,    Thank you for allowing me to participate in the care of Mr. Mac Arellano. He presents today at the [address] [place] [address]. As you know, Mr. Caitlin Klein is a 77 y.o. male with history of hypertension, hyperlipidemia, diabetes, COPD, and DEEDEE-CPAP who presents with the chief complaint of the bradycardia, shortness of breath, & chest tightness. All records from referring provider have been reviewed. He tells me that for the past 2 or 3 years he has been experiencing shortness of breath with exertion. He has only been noticing worsening and cannot walk to the from the office without becoming breathless. He also complains of fatigue for the past 2 years has been worsening. He also complains of chest tightness that happens with exertion and when he sits down and rests it goes away. Will radiate up his right shoulder and up into his neck. He is a former smoker and quit in June 2021. He has a history of heart disease on his mom's side of his family. He is running a low heart rate in the upper 40s and low 50s for years however has had no dizziness or fainting episodes. He is on no rate lowering medications. HCTZ was added to his medication regimen a couple weeks ago due to high blood pressure. Is continue to run high at home. Averaging [id number] systolic. he is sleeping on 1 pillow at night. he is not waking gasping for air. He uses a CPAP. he denies any swelling. he has been compliant with his medications. his BP has been controlled. PCP follows labs and lipids. He otherwise denies PND, orthopnea, syncope or near syncope. He has no other complaints. Review of Systems   Constitutional: Positive for fatigue. Negative for fever, chills, diaphoresis, activity change, appetite change, and unexpected weight change. Eyes: Negative for photophobia, pain, redness and visual disturbance. Respiratory: Positive for DUCKWORTH. Negative for apnea, cough, chest tightness, wheezing and stridor. Cardiovascular: Positive for chest pain. Negative for palpitations and leg swelling. Gastrointestinal: Negative for abdominal distention. Genitourinary: Negative for dysuria, urgency and frequency. Musculoskeletal: Negative for myalgias, arthralgias and gait problem. Skin: Negative for color change, pallor, rash and wound. Neurological: Negative for dizziness, tremors, speech difficulty, weakness and numbness. Hematological: Does not bruise/bleed easily. Psychiatric/Behavioral: Negative. History reviewed. No pertinent past medical history. History reviewed. No pertinent surgical history. Family History   Problem Relation Age of Onset    Stroke Mother     Heart Attack Maternal Grandfather        Social History     Socioeconomic History    Marital status:      Spouse name: Not on file    Number of children: Not on file    Years of education: Not on file    Highest education level: Not on file   Occupational History    Not on file   Tobacco Use    Smoking status: Former Smoker     Packs/day: 2.00     Years: 50.00     Pack years: 100.00     Types: Cigarettes     Quit date: 2021     Years since quittin.0    Smokeless tobacco: Never Used   Vaping Use    Vaping Use: Never used   Substance and Sexual Activity    Alcohol use: Yes     Comment: rarely    Drug use: Never    Sexual activity: Yes     Partners: Female   Other Topics Concern    Not on file   Social History Narrative    Not on file     Social Determinants of Health     Financial Resource Strain:     Difficulty of Paying Living Expenses:    Food Insecurity:     Worried About Running Out of Food in the Last Year:     [address] in the Last Year:    Transportation Needs:     Lack of Transportation (Medical):      Lack of Transportation (Non-Medical):    Physical Activity:     Days of Exercise per Week:     Minutes of Exercise per Session:    Stress:     Feeling of Stress :    Social Connections:  Frequency of Communication with Friends and Family:     Frequency of Social Gatherings with Friends and Family:     Attends [employer] Services:     Active Member of Clubs or Organizations:     Attends Club or Organization Meetings:     Marital Status:    Intimate Partner Violence:     Fear of Current or Ex-Partner:     Emotionally Abused:     Physically Abused:     Sexually Abused:        No Known Allergies      Current Outpatient Medications:     aspirin 81 MG EC tablet, Take 81 mg by mouth daily, Disp: , Rfl:     varenicline (CHANTIX) 0.5 MG tablet, Take 0.5 mg by mouth 2 times daily, Disp: , Rfl:     glimepiride (AMARYL) 2 MG tablet, Take 2 mg by mouth every morning (before breakfast), Disp: , Rfl:     omeprazole (PRILOSEC) 20 MG delayed release capsule, Take 20 mg by mouth daily, Disp: , Rfl:     olmesartan (BENICAR) 40 MG tablet, Take 40 mg by mouth daily, Disp: , Rfl:     hydroCHLOROthiazide (HYDRODIURIL) 25 MG tablet, Take 25 mg by mouth daily, Disp: , Rfl:     simvastatin (ZOCOR) 20 MG tablet, Take 20 mg by mouth nightly, Disp: , Rfl:     amLODIPine (NORVASC) 5 MG tablet, Take 1 tablet by mouth daily, Disp: 30 tablet, Rfl: 5    PE:  Vitals:    07/06/21 0832   BP: (!) 140/76   Pulse: 52   SpO2: 99%       Estimated body mass index is 31.78 kg/m² as calculated from the following:    Height as of this encounter: 5' 8\" (1.727 m). Weight as of this encounter: 209 lb (94.8 kg). Constitutional: He is oriented to person, place, and time. He appears well-developed and well-nourished in no acute distress. Neck:  Neck supple without JVD present. No trachea deviation present. No thyromegaly present. Eyes:Conjunctivae and EOM are normal. Pupils equal and reactive to light. ENT:Hearing appears normal.conjunctiva and lids are normal, ears and nose appear normal.  Cardiovascular: Normal rate, Normal rhythm, normal heart sounds. 1/6 murmur ascultated. No gallop and no friction rub.   No carotid bruits. No peripheral edema. Pulmonary/Chest:  Lungs clear to auscultation bilaterally without evidence of respiratory distress. He without wheezes. He without rales or ronchi. Musculoskeletal: Normal range of motion. Gait is normal.  Head is normocephalic and atraumatic. Skin: Skin is warm and dry without rash or pallor. Psychiatric:He is alert and oriented to person, place, and time. He has a normal mood and affect. His behavior is normal. Thought content normal.       No results found for: CREATININE, HGB, PROBNP      ECG 07/06/21  Sinus Bradycardia, HR 52 bpm       Assessment, Recommendations, & Plan:  77 y.o. male with HTN, HLD, chest pain, DUCKWORTH, & bradycardia    HTN-elevated on hydrochlorothiazide & Benicar. Will add Norvasc 5 mg daily. HLD-followed by PCP, on Zocor, no changes    Chest pain-Lexiscan    DUCKWORTH-Lexiscan & TTE    Bradycardia-Zio patch      Disposition - RTC in 1 month or sooner if needed      Please do not hesitate to contact me for any questions or concerns.     Sincerely yours,    MARIE Watson

## 2021-07-06 NOTE — PATIENT INSTRUCTIONS
Start Norvasc 5 mg daily       14-day monitor (ZIO Patch)      ZIO Patch  The Stephanie Rolle is a new form of ambulatory cardiac monitoring described as a wearable patch. The Alisha Kev is unique compared to traditional Holter monitors as the monitoring device has no leads, no wires and no batteries. The Alisha Kev weighs just a few ounces that is a peel and stick device that is worn for an extended monitoring period of up to 14 days. Even though the device is worn for a longer duration than a Holter monitor, the ZIO Patch is said to be preferred by nearly 80% of patients as compared to a traditional 24 Holter monitor. Please allow 8 to 10 days for results, once you have mailed off your device. Features of the ZIO Patch:  Arguably the smallest ambulatory cardiac monitor   Light weight   No lead wires   Single channel ECG recording   No batteries   Superior patient compliance with a water resistant   Up to 14 days of continuous ECG recording        [place] at the [facility] located on the first floor of [place] [address] through hospital main entrance and turn immediately to your left. Patient's contact number:  [phone] (home)      Lexiscan Stress Test      Lexiscan (regadenoson injection) is a prescription drug given through an IV line that increases blood flow through the arteries of the heart during a cardiac nuclear stress test.     There are two parts to a Lexiscan stress test: the rest portion and the exercise portion. For the rest portion, a radioactive tracer is injected into your arm through the IV. After 30 to 60 minutes, the process of imaging will begin. A nuclear camera will be placed on your chest area and images are taken for the next 15 to 20 minutes. For the exercise portion, a nurse will attach EKG electrodes to your chest to monitor your heart rate. The drug Denilson Rodríguez is administered to simulate stress on the heart.   Your heart rhythm will then be monitored for the next few minutes. Your blood pressure will also be monitored throughout the exercise portion. Justice through the exercise portion, a second round of radioactive tracer is injected into your body. Your heart rate and EKG will be monitored for another few minutes after administering the drug. Test Preparation:     Bring a list of your current medications. Do not take any of your medications the morning of the test, but bring all morning medications with you as you will take them after the stress portion of the test is completed.  Do not eat Bananas 24 hours prior to test.     No caffeine 24 hours prior to the testing. This includes: coffee, pop/soda, chocolate, cold medications, etc.  Any product that might contain caffeine.  No nicotine or alcohol 12 hours prior to your test.    Nothing to eat or drink 6-8 hours prior to appointment time. It is okay to drink small amounts of water during the four hours prior to the test.   Nitroglycerin patches must be taken off 1 hour before testing.  Wear comfortable clothing.  Please refrain from any strenuous exercise or activities the day before your test, or the day of your test.   The Nuclear Lexiscan Stress test takes about 2 ½ to 3 hours to complete. If for any reason you are unable to keep this appointment, please contact Outpatient Scheduling, [phone], as soon as possible to reschedule. [place] at the [address] [place] [address] and [address] [facility] located on the first floor of [place] [address] through hospital main entrance and turn immediately to your left. Date/Time:     Patient's contact number:  [phone] (home)     Echocardiogram -  No prep. Takes approximately 30 min. An echocardiogram uses sound waves to produce images of your heart. This commonly used test allows your doctor to see how your heart is beating and pumping blood.  Your doctor can use the images from an echocardiogram to identify various abnormalities in the heart muscle and valves. This test has 2 parts:   Ø You will be asked to disrobe from the waist up and given a gown to wear. The technologist will then hook up an EKG monitor to you for the entire exam.   Ø You will then have an ultrasound of your heart (echocardiogram) to assess the heart muscle, heart valves and heart function. You may eat and take any medicines before the exam.     If you need to change your appointment, please call outpatient scheduling at [phone].

## 2021-07-29 ENCOUNTER — HOSPITAL ENCOUNTER (OUTPATIENT)
Dept: NON INVASIVE DIAGNOSTICS | Age: 66
Discharge: HOME | End: 2021-07-29
Payer: MEDICARE

## 2021-07-29 ENCOUNTER — HOSPITAL ENCOUNTER (OUTPATIENT)
Dept: NUCLEAR MEDICINE | Age: 66
Discharge: HOME | End: 2021-07-31
Payer: MEDICARE

## 2021-07-29 DIAGNOSIS — R06.09 DOE (DYSPNEA ON EXERTION): ICD-10-CM

## 2021-07-29 DIAGNOSIS — R07.89 OTHER CHEST PAIN: ICD-10-CM

## 2021-07-29 LAB
LV EF: 58 %
LV EF: 65 %
LVEF MODALITY: NORMAL
LVEF MODALITY: NORMAL

## 2021-07-29 PROCEDURE — 6360000002 HC RX W HCPCS: Performed by: NURSE PRACTITIONER

## 2021-07-29 PROCEDURE — A9500 TC99M SESTAMIBI: HCPCS | Performed by: NURSE PRACTITIONER

## 2021-07-29 PROCEDURE — 3430000000 HC RX DIAGNOSTIC RADIOPHARMACEUTICAL: Performed by: NURSE PRACTITIONER

## 2021-07-29 PROCEDURE — 93306 TTE W/DOPPLER COMPLETE: CPT

## 2021-07-29 PROCEDURE — 78452 HT MUSCLE IMAGE SPECT MULT: CPT

## 2021-07-29 RX ADMIN — REGADENOSON 0.4 MG: 0.08 INJECTION, SOLUTION INTRAVENOUS at 12:05

## 2021-07-29 RX ADMIN — TETRAKIS(2-METHOXYISOBUTYLISOCYANIDE)COPPER(I) TETRAFLUOROBORATE 30 MILLICURIE: 1 INJECTION, POWDER, LYOPHILIZED, FOR SOLUTION INTRAVENOUS at 13:20

## 2021-07-29 RX ADMIN — TETRAKIS(2-METHOXYISOBUTYLISOCYANIDE)COPPER(I) TETRAFLUOROBORATE 10 MILLICURIE: 1 INJECTION, POWDER, LYOPHILIZED, FOR SOLUTION INTRAVENOUS at 13:19

## 2021-08-25 ENCOUNTER — VIRTUAL VISIT (OUTPATIENT)
Dept: CARDIOLOGY CLINIC | Age: 66
End: 2021-08-25
Payer: MEDICARE

## 2021-08-25 DIAGNOSIS — I10 ESSENTIAL HYPERTENSION: Primary | ICD-10-CM

## 2021-08-25 DIAGNOSIS — R00.1 BRADYCARDIA: ICD-10-CM

## 2021-08-25 DIAGNOSIS — E78.5 HYPERLIPIDEMIA, UNSPECIFIED HYPERLIPIDEMIA TYPE: ICD-10-CM

## 2021-08-25 PROCEDURE — G8417 CALC BMI ABV UP PARAM F/U: HCPCS | Performed by: NURSE PRACTITIONER

## 2021-08-25 PROCEDURE — G8427 DOCREV CUR MEDS BY ELIG CLIN: HCPCS | Performed by: NURSE PRACTITIONER

## 2021-08-25 PROCEDURE — 99441 PR PHYS/QHP TELEPHONE EVALUATION 5-10 MIN: CPT | Performed by: NURSE PRACTITIONER

## 2021-08-25 PROCEDURE — 1123F ACP DISCUSS/DSCN MKR DOCD: CPT | Performed by: NURSE PRACTITIONER

## 2021-08-25 PROCEDURE — 3017F COLORECTAL CA SCREEN DOC REV: CPT | Performed by: NURSE PRACTITIONER

## 2021-08-25 PROCEDURE — 1036F TOBACCO NON-USER: CPT | Performed by: NURSE PRACTITIONER

## 2021-08-25 PROCEDURE — 4040F PNEUMOC VAC/ADMIN/RCVD: CPT | Performed by: NURSE PRACTITIONER

## 2021-08-25 NOTE — PROGRESS NOTES
change. Eyes: Negative for photophobia, pain, redness and visual disturbance. Respiratory:  Positive for DUCKWORTH. Negative for apnea, cough, chest tightness, shortness of breath, wheezing and stridor. Cardiovascular: Negative for chest pain, palpitations and leg swelling. Gastrointestinal: Negative for abdominal distention. Genitourinary: Negative for dysuria, urgency and frequency. Musculoskeletal: Negative for myalgias, arthralgias and gait problem. Skin: Negative for color change, pallor, rash and wound. Neurological: Negative for dizziness, tremors, speech difficulty, weakness and numbness. Hematological: Does not bruise/bleed easily. Psychiatric/Behavioral: Negative. Bret Rae is a 77 y.o. male with the following history as recorded in [employer]: There are no problems to display for this patient. Current Outpatient Medications   Medication Sig Dispense Refill    aspirin 81 MG EC tablet Take 81 mg by mouth daily      varenicline (CHANTIX) 0.5 MG tablet Take 0.5 mg by mouth 2 times daily      glimepiride (AMARYL) 2 MG tablet Take 2 mg by mouth 2 times daily       omeprazole (PRILOSEC) 20 MG delayed release capsule Take 20 mg by mouth daily      olmesartan (BENICAR) 40 MG tablet Take 40 mg by mouth daily      simvastatin (ZOCOR) 20 MG tablet Take 20 mg by mouth nightly      amLODIPine (NORVASC) 5 MG tablet Take 1 tablet by mouth daily 30 tablet 5     No current facility-administered medications for this visit. Allergies: Patient has no known allergies. History reviewed. No pertinent past medical history. History reviewed. No pertinent surgical history.   Family History   Problem Relation Age of Onset    Stroke Mother     Heart Attack Maternal Grandfather      Social History     Tobacco Use    Smoking status: Former Smoker     Packs/day: 2.00     Years: 50.00     Pack years: 100.00     Types: Cigarettes     Quit date: 2021     Years since quittin.2    Smokeless tobacco: Never Used   Substance Use Topics    Alcohol use: Yes     Comment: rarely      Lexiscan-7/29/21  There is no obvious infarct or ischemia, with a calculated ejection   fraction of 65 %. Suggest: Clinical correlation with consideration for medical   management. TTE-7/29/21  Upper normal left ventricular size with preserved systolic function EF   62-66%   Mild concentric left ventricular hypertrophy with transmitral tissue   Doppler suggesting moderate [grade 2] diastolic dysfunction   Upper normal left atrial size   Mild thickening of a tricuspid aortic valve with adequate cusp separation   and neither stenosis or insufficiency   Normally mobile mitral valve with trace to mild regurgitation   No evidence of significant pulmonic stenosis or insufficiency   Normal right-sided chambers with preserved RV systolic function   No tricuspid regurgitation captured with which to estimate RVSP   IVC dimension and inspiratory motion appear normal consistent with right   atrial filling pressures of 5-10 mmHg   Aortic root dimensions are within normal limits   No significant pericardial effusion    Ziopatch-7/6/21  Rhythm, average heart rate 59 bpm  6 runs SVT-the longest 10.9 seconds  Rare ectopy  No A. fib, pauses, or high degree AV block  Triggered event correlated to normal sinus rhythm & PACs    Assessment/ Plan:  HTN, HLD, & bradycardia    HTN-elevated at home this morning before blood pressure medications. He has not been checking his blood pressure at home the past month. Continue Norvasc and Benicar. He has been instructed to keep a BP log daily at home. HLD-followed by PCP, on Crestor, no changes    Bradycardia-average heart rate 59 bpm on Zio patch. No symptoms. Monitor    Disposition: Return in about 2 months (around 10/25/2021) for BP check, Medication adjustment.      I affirm this is a Patient Initiated Episode with an Established Patient who has not had a related appointment within my department in the past 7 days or scheduled within the next 24 hours.     Patient identification was verified at the start of the visit: Yes    Total Time: minutes: 5-10 minutes    Note: not billable if this call serves to triage the patient into an appointment for the relevant concern      MARIE Granados

## 2021-10-26 ENCOUNTER — OFFICE VISIT (OUTPATIENT)
Dept: CARDIOLOGY CLINIC | Age: 66
End: 2021-10-26
Payer: MEDICARE

## 2021-10-26 VITALS
OXYGEN SATURATION: 99 % | WEIGHT: 208 LBS | BODY MASS INDEX: 31.52 KG/M2 | DIASTOLIC BLOOD PRESSURE: 82 MMHG | SYSTOLIC BLOOD PRESSURE: 132 MMHG | HEIGHT: 68 IN | HEART RATE: 50 BPM

## 2021-10-26 DIAGNOSIS — R00.1 BRADYCARDIA: ICD-10-CM

## 2021-10-26 DIAGNOSIS — I10 ESSENTIAL HYPERTENSION: Primary | ICD-10-CM

## 2021-10-26 DIAGNOSIS — E78.5 HYPERLIPIDEMIA, UNSPECIFIED HYPERLIPIDEMIA TYPE: ICD-10-CM

## 2021-10-26 PROCEDURE — 3017F COLORECTAL CA SCREEN DOC REV: CPT | Performed by: NURSE PRACTITIONER

## 2021-10-26 PROCEDURE — G8427 DOCREV CUR MEDS BY ELIG CLIN: HCPCS | Performed by: NURSE PRACTITIONER

## 2021-10-26 PROCEDURE — 1123F ACP DISCUSS/DSCN MKR DOCD: CPT | Performed by: NURSE PRACTITIONER

## 2021-10-26 PROCEDURE — 4040F PNEUMOC VAC/ADMIN/RCVD: CPT | Performed by: NURSE PRACTITIONER

## 2021-10-26 PROCEDURE — G8484 FLU IMMUNIZE NO ADMIN: HCPCS | Performed by: NURSE PRACTITIONER

## 2021-10-26 PROCEDURE — 1036F TOBACCO NON-USER: CPT | Performed by: NURSE PRACTITIONER

## 2021-10-26 PROCEDURE — G8417 CALC BMI ABV UP PARAM F/U: HCPCS | Performed by: NURSE PRACTITIONER

## 2021-10-26 PROCEDURE — 99213 OFFICE O/P EST LOW 20 MIN: CPT | Performed by: NURSE PRACTITIONER

## 2021-10-26 NOTE — PROGRESS NOTES
Dear Dr. Cash Guadarrama MD,    Thank you for allowing me to participate in the care of Mr. Frances Sullivan. He presents today at the [address] [place] [address]. As you know, Mr. Maksim Hernandez is a 74 y.o. male with history of hypertension, hyperlipidemia, diabetes, COPD, and DEEDEE-CPAP who presents with the chief complaint of follow-up for chronic cardiac conditions. Last in person visit, Norvasc was added to his medication regimen. Blood pressure is improved here today. He has not been taking it regularly at home. He denies any side effects of the Norvasc. He denies any chest pain. He has DUCKWORTH which is chronic and stable for him. He denies any dizziness or syncopal episodes. he is sleeping on 1 pillow at night. he is not waking gasping for air. he denies any swelling. he has been compliant with his medications. his BP has been controlled. PCP follows labs and lipids. He otherwise denies chest pain, PND, orthopnea, syncope or near syncope. He has no other complaints. Review of Systems   Constitutional: Positive for fatigue. Negative for fever, chills, diaphoresis, activity change, appetite change, and unexpected weight change. Eyes: Negative for photophobia, pain, redness and visual disturbance. Respiratory: Positive for DUCKWORTH (chronic, stable). Negative for apnea, cough, chest tightness, wheezing and stridor. Cardiovascular: Positive for chest pain (resolved). Negative for palpitations and leg swelling. Gastrointestinal: Negative for abdominal distention. Genitourinary: Negative for dysuria, urgency and frequency. Musculoskeletal: Negative for myalgias, arthralgias and gait problem. Skin: Negative for color change, pallor, rash and wound. Neurological: Negative for dizziness, tremors, speech difficulty, weakness and numbness. Hematological: Does not bruise/bleed easily. Psychiatric/Behavioral: Negative. History reviewed. No pertinent past medical history. History reviewed.  No pertinent surgical history. Family History   Problem Relation Age of Onset    Stroke Mother     Heart Attack Maternal Grandfather        Social History     Socioeconomic History    Marital status:      Spouse name: Not on file    Number of children: Not on file    Years of education: Not on file    Highest education level: Not on file   Occupational History    Not on file   Tobacco Use    Smoking status: Former Smoker     Packs/day: 2.00     Years: 50.00     Pack years: 100.00     Types: Cigarettes     Quit date: 2021     Years since quittin.4    Smokeless tobacco: Never Used   Vaping Use    Vaping Use: Never used   Substance and Sexual Activity    Alcohol use: Yes     Comment: rarely    Drug use: Never    Sexual activity: Yes     Partners: Female   Other Topics Concern    Not on file   Social History Narrative    Not on file     Social Determinants of Health     Financial Resource Strain:     Difficulty of Paying Living Expenses:    Food Insecurity:     Worried About Running Out of Food in the Last Year:     [address] in the Last Year:    Transportation Needs:     Lack of Transportation (Medical):      Lack of Transportation (Non-Medical):    Physical Activity:     Days of Exercise per Week:     Minutes of Exercise per Session:    Stress:     Feeling of Stress :    Social Connections:     Frequency of Communication with Friends and Family:     Frequency of Social Gatherings with Friends and Family:     Attends Confucianist Services:     Active Member of Clubs or Organizations:     Attends Club or Organization Meetings:     Marital Status:    Intimate Partner Violence:     Fear of Current or Ex-Partner:     Emotionally Abused:     Physically Abused:     Sexually Abused:        No Known Allergies      Current Outpatient Medications:     aspirin 81 MG EC tablet, Take 81 mg by mouth daily, Disp: , Rfl:     glimepiride (AMARYL) 2 MG tablet, Take 2 mg by mouth 2 times daily , Disp: , Rfl:     omeprazole (PRILOSEC) 20 MG delayed release capsule, Take 20 mg by mouth daily, Disp: , Rfl:     olmesartan (BENICAR) 40 MG tablet, Take 40 mg by mouth daily, Disp: , Rfl:     simvastatin (ZOCOR) 20 MG tablet, Take 20 mg by mouth nightly, Disp: , Rfl:     amLODIPine (NORVASC) 5 MG tablet, Take 1 tablet by mouth daily, Disp: 30 tablet, Rfl: 5    varenicline (CHANTIX) 0.5 MG tablet, Take 0.5 mg by mouth 2 times daily (Patient not taking: Reported on 10/26/2021), Disp: , Rfl:     PE:  Vitals:    10/26/21 0848   BP: 132/82   Pulse: 50   SpO2: 99%       Estimated body mass index is 31.63 kg/m² as calculated from the following:    Height as of this encounter: 5' 8\" (1.727 m). Weight as of this encounter: 208 lb (94.3 kg). Constitutional: He is oriented to person, place, and time. He appears well-developed and well-nourished in no acute distress. Neck:  Neck supple without JVD present. No trachea deviation present. No thyromegaly present. Eyes:Conjunctivae and EOM are normal. Pupils equal and reactive to light. ENT:Hearing appears normal.conjunctiva and lids are normal, ears and nose appear normal.  Cardiovascular: Normal rate, Normal rhythm, normal heart sounds. 1/6 murmur ascultated. No gallop and no friction rub. No carotid bruits. No peripheral edema. Pulmonary/Chest:  Lungs clear to auscultation bilaterally without evidence of respiratory distress. He without wheezes. He without rales or ronchi. Musculoskeletal: Normal range of motion. Gait is normal.  Head is normocephalic and atraumatic. Skin: Skin is warm and dry without rash or pallor. Psychiatric:He is alert and oriented to person, place, and time. He has a normal mood and affect.  His behavior is normal. Thought content normal.       No results found for: CREATININE, HGB, PROBNP      Assessment, Recommendations, & Plan:  74 y.o. male with HTN, HLD, chest pain, DUCKWORTH, & bradycardia    HTN-Controlled on

## 2021-10-26 NOTE — PATIENT INSTRUCTIONS
Hypertension  (High Blood Pressure)  Most people with high blood pressure (hypertension) have no symptoms. High blood pressure can be a dangerous problem. Hypertension is dangerous because you may have it and not know it. High blood pressure may mean that your heart needs to work harder to pump blood. Your blood pressure is measured with 2 numbers. The first number is when your heart flexes (contracts), and the second number is when your heart relaxes. The higher the numbers are, the more you are at risk for problems. Write down your blood pressure today. The best blood pressure for adults is 120/80 (mmHg) or lower. It is likely that your blood pressure was recorded at least 2 times today. It is important for you to give these numbers to your doctor. If you do not have a doctor, try to get follow-up care at a hospital or community clinic. You may need to start high blood pressure medicine. You may also need to adjust your medicines as told by your doctor. Even mild high blood pressure increases long-term health risks. One high reading does not mean you have hypertension. · Your blood pressure should be taken when you are relaxed. It is also important to sit for about 10 minutes before being tested. · Things that can increase your blood pressure are:  Injury, Illness, Stress, Caffeine, and some medicines (like decongestants). · High blood pressure does not usually need emergency treatment. HOME CARE  · Lifestyle and medicine changes may be needed, including:   · Weight loss. · Exercise. · Limit the use of salt. · Stop smoking. · If using decongestants or birth control pills, talk to your doctor. These medicines might make blood pressure higher. · Do not use street drugs. · Females should not drink more than 1 alcoholic drink per day. Males should not drink more than 2 alcoholic drinks per day. · Take your blood pressure medicine. You will need to take it every day.  If you do not get treated, there are risks, including:   · Heart disease. · Stroke. · Kidney failure. · See your doctor as told. GET HELP RIGHT AWAY IF:  · You get a very bad headache. · You get blurred or changing vision. · You feel confused. · You feel weak, numb, or faint. · You get chest or belly (abdominal) pain. · You throw up (vomit). · You cannot breathe very well. If you have a blood pressure reading with a top number of 180 or higher, you need to see your doctor right away. This is especially true if you are having any of the problems listed above. Hyperlipidemia   (Dyslipidemia; High Triglycerides; Triglycerides, High)     Definition   Hyperlipidemia is a high level of fats in the blood. These fats, called lipids, include cholesterol and triglycerides. There are five types of hyperlipidemia. The type depends on which lipid in the blood is high. Causes   Causes may include:   A family history of hyperlipidemia   A diet high in total fat, saturated fat, or cholesterol   Obesity   Excess alcohol intake   Certain conditions, including:   Diabetes   Low thyroid   Kidney problems   Liver disease   Cushing's syndrome   Certain drugs, such as:   Hormones or birth control pills   Beta-blockers   Some diuretics   Cortisone drugs   Isotretinoin (for acne )   Some anti- HIV drugs   Risk Factors   These factors increase your chance of developing this condition. Tell your doctor if you have any of these:   Advancing age   Sex: male   Postmenopause   Lack of exercise   Smoking   Stress   Overuse of alcohol   Symptoms   Hyperlipidemia usually does not cause symptoms. Very high levels of lipids or triglycerides can cause:   Fat deposits in the skin or tendons ( xanthomas )   Pain, enlargement, or swelling of organs such as the liver, spleen, or pancreas ( pancreatitis )   Obstruction of blood vessels in heart and brain   Hyperlipidemia can increase your risk of atherosclerosis .  This is a dangerous hardening of the arteries. It can end up blocking blood flow. In some cases, this may result in:   Angina   Heart attack   Stroke   Other serious complications   Blood Vessel with Atherosclerosis       2011 [facility].   Diagnosis   This condition is diagnosed with blood tests. These tests measure the levels of lipids in the blood. The National Cholesterol Education Program advises that you have your lipids checked at least once every five years, starting at age 21. Also, the American Academy of Pediatrics recommends lipid screening for children at risk (eg, a family history of hyperlipidemia). Testing may consist of a fasting blood test for:   Total cholesterol   LDL (bad cholesterol)   HDL (good cholesterol)   Triglycerides   Your doctor may recommend more frequent or earlier testing if you have:   Family history of hyperlipidemia   Risk factor or disease that may cause hyperlipidemia   Complication that may result from hyperlipidemia   Treatment   Treatment is not only aimed at correcting your cholesterol levels, but also at lowering your overall risk for heart disease and strokes. Diet Changes   Eat a diet low in total fat, saturated fat, and cholesterol . Reduce or eliminate the amount of alcohol you drink. Eat more high-fiber foods. Lifestyle Changes   If you are overweight, lose weight . If you smoke, quit . Exercise regularly . Talk to you doctor before starting an exercise program. Tisha Rolle may already have hardening of the arteries or heart disease. These conditions increase your risk of having a heart attack while exercising. Make sure other medical conditions such as high blood pressure and diabetes are being treated and controlled. Medications   There are a number of drugs available, such as statins , to treat this condition and help lower your risk for heart disease. Talk to your doctor. Statins have been shown to reduce mortality (death), heart attacks , and stroke.

## 2022-04-26 ENCOUNTER — OFFICE VISIT (OUTPATIENT)
Dept: CARDIOLOGY CLINIC | Age: 67
End: 2022-04-26
Payer: MEDICARE

## 2022-04-26 VITALS
SYSTOLIC BLOOD PRESSURE: 144 MMHG | WEIGHT: 197 LBS | HEART RATE: 55 BPM | DIASTOLIC BLOOD PRESSURE: 80 MMHG | HEIGHT: 68 IN | BODY MASS INDEX: 29.86 KG/M2 | OXYGEN SATURATION: 98 %

## 2022-04-26 DIAGNOSIS — E78.5 HYPERLIPIDEMIA, UNSPECIFIED HYPERLIPIDEMIA TYPE: ICD-10-CM

## 2022-04-26 DIAGNOSIS — R00.1 BRADYCARDIA: ICD-10-CM

## 2022-04-26 DIAGNOSIS — I10 ESSENTIAL HYPERTENSION: Primary | ICD-10-CM

## 2022-04-26 PROCEDURE — 3017F COLORECTAL CA SCREEN DOC REV: CPT | Performed by: NURSE PRACTITIONER

## 2022-04-26 PROCEDURE — 1123F ACP DISCUSS/DSCN MKR DOCD: CPT | Performed by: NURSE PRACTITIONER

## 2022-04-26 PROCEDURE — 1036F TOBACCO NON-USER: CPT | Performed by: NURSE PRACTITIONER

## 2022-04-26 PROCEDURE — 4040F PNEUMOC VAC/ADMIN/RCVD: CPT | Performed by: NURSE PRACTITIONER

## 2022-04-26 PROCEDURE — G8427 DOCREV CUR MEDS BY ELIG CLIN: HCPCS | Performed by: NURSE PRACTITIONER

## 2022-04-26 PROCEDURE — 99214 OFFICE O/P EST MOD 30 MIN: CPT | Performed by: NURSE PRACTITIONER

## 2022-04-26 PROCEDURE — G8417 CALC BMI ABV UP PARAM F/U: HCPCS | Performed by: NURSE PRACTITIONER

## 2022-04-26 RX ORDER — EMPAGLIFLOZIN 10 MG/1
10 TABLET, FILM COATED ORAL DAILY
COMMUNITY
Start: 2022-02-23

## 2022-04-26 RX ORDER — AMLODIPINE BESYLATE 5 MG/1
5 TABLET ORAL 2 TIMES DAILY
Qty: 180 TABLET | Refills: 3 | Status: SHIPPED | OUTPATIENT
Start: 2022-04-26

## 2022-04-26 NOTE — PROGRESS NOTES
Dear Dr. Denise March MD,    Thank you for allowing me to participate in the care of Mr. Yaneth Cerna. He presents today at the [address] [place] [address]. As you know, Mr. Brian Casper is a 74 y.o. male with history of hypertension, hyperlipidemia, diabetes, COPD, and DEEDEE-CPAP who presents with the chief complaint of follow-up for chronic cardiac conditions. He was supposed to see Dr. Italia Loera in this visit. Denies any chest pain. He has DUCKWORTH which is chronic and stable. He does still have some fatigue. He denies any fast or slow heart rhythms. Blood pressure on average runs 694-470 systolic and diastolic readings 14-46L. he is sleeping on 1 pillow at night. he is not waking gasping for air. He uses a CPAP at night. he denies any swelling. he has been compliant with his medications. his BP has been controlled. PCP follows labs and lipids. He otherwise denies chest pain, PND, orthopnea, syncope or near syncope. He has no other complaints. Review of Systems   Constitutional: Positive for fatigue (stable). Negative for fever, chills, diaphoresis, activity change, appetite change, and unexpected weight change. Eyes: Negative for photophobia, pain, redness and visual disturbance. Respiratory: Positive for DUCKWORTH (chronic, stable). Negative for apnea, cough, chest tightness, wheezing and stridor. Cardiovascular:  Negative for chest pain, palpitations and leg swelling. Gastrointestinal: Negative for abdominal distention. Genitourinary: Negative for dysuria, urgency and frequency. Musculoskeletal: Negative for myalgias, arthralgias and gait problem. Skin: Negative for color change, pallor, rash and wound. Neurological: Negative for dizziness, tremors, speech difficulty, weakness and numbness. Hematological: Does not bruise/bleed easily. Psychiatric/Behavioral: Negative.         Past Medical History:   Diagnosis Date    Hypertension        Past Surgical History:   Procedure Laterality Date    CATARACT REMOVAL WITH IMPLANT Bilateral 2021       Family History   Problem Relation Age of Onset    Stroke Mother     Heart Attack Maternal Grandfather        Social History     Socioeconomic History    Marital status:      Spouse name: Not on file    Number of children: Not on file    Years of education: Not on file    Highest education level: Not on file   Occupational History    Not on file   Tobacco Use    Smoking status: Former Smoker     Packs/day: 2.00     Years: 50.00     Pack years: 100.00     Types: Cigarettes     Quit date: 2021     Years since quittin.8    Smokeless tobacco: Never Used   Vaping Use    Vaping Use: Never used   Substance and Sexual Activity    Alcohol use: Yes     Comment: rarely    Drug use: Never    Sexual activity: Yes     Partners: Female   Other Topics Concern    Not on file   Social History Narrative    Not on file     Social Determinants of Health     Financial Resource Strain:     Difficulty of Paying Living Expenses: Not on file   Food Insecurity:     Worried About [address] in the Last Year: Not on file    [address] in the Last Year: Not on file   Transportation Needs:     Lack of Transportation (Medical): Not on file    Lack of Transportation (Non-Medical):  Not on file   Physical Activity:     Days of Exercise per Week: Not on file    Minutes of Exercise per Session: Not on file   Stress:     Feeling of Stress : Not on file   Social Connections:     Frequency of Communication with Friends and Family: Not on file    Frequency of Social Gatherings with Friends and Family: Not on file    Attends Sabianist Services: Not on file    Active Member of Clubs or Organizations: Not on file    Attends Club or Organization Meetings: Not on file    Marital Status: Not on file   Intimate Partner Violence:     Fear of Current or Ex-Partner: Not on file    Emotionally Abused: Not on file    Physically Abused: Not on file  Sexually Abused: Not on file   Housing Stability:     Unable to Pay for Housing in the Last Year: Not on file    Number of Places Lived in the Last Year: Not on file    Unstable Housing in the Last Year: Not on file       No Known Allergies      Current Outpatient Medications:     JARDIANCE 10 MG tablet, Take 10 mg by mouth daily, Disp: , Rfl:     aspirin 81 MG EC tablet, Take 81 mg by mouth daily, Disp: , Rfl:     glimepiride (AMARYL) 2 MG tablet, Take 2 mg by mouth 2 times daily , Disp: , Rfl:     omeprazole (PRILOSEC) 20 MG delayed release capsule, Take 20 mg by mouth daily, Disp: , Rfl:     olmesartan (BENICAR) 40 MG tablet, Take 40 mg by mouth daily, Disp: , Rfl:     simvastatin (ZOCOR) 20 MG tablet, Take 20 mg by mouth nightly, Disp: , Rfl:     amLODIPine (NORVASC) 5 MG tablet, Take 1 tablet by mouth daily, Disp: 30 tablet, Rfl: 5    varenicline (CHANTIX) 0.5 MG tablet, Take 0.5 mg by mouth 2 times daily  (Patient not taking: Reported on 4/26/2022), Disp: , Rfl:     PE:  Vitals:    04/26/22 1045   BP: (!) 144/80   Pulse: 55   SpO2: 98%       Estimated body mass index is 29.95 kg/m² as calculated from the following:    Height as of this encounter: 5' 8\" (1.727 m). Weight as of this encounter: 197 lb (89.4 kg). Constitutional: He is oriented to person, place, and time. He appears well-developed and well-nourished in no acute distress. Neck:  Neck supple without JVD present. No trachea deviation present. No thyromegaly present. Eyes:Conjunctivae and EOM are normal. Pupils equal and reactive to light. ENT:Hearing appears normal.conjunctiva and lids are normal, ears and nose appear normal.  Cardiovascular: Normal rate, Normal rhythm, normal heart sounds. 1/6 murmur ascultated. No gallop and no friction rub. No carotid bruits. No peripheral edema. Pulmonary/Chest:  Lungs clear to auscultation bilaterally without evidence of respiratory distress. He without wheezes.  He without rales or elida. Musculoskeletal: Normal range of motion. Gait is normal.  Head is normocephalic and atraumatic. Skin: Skin is warm and dry without rash or pallor. Psychiatric:He is alert and oriented to person, place, and time. He has a normal mood and affect. His behavior is normal. Thought content normal.       No results found for: CREATININE, HGB, PROBNP      Assessment, Recommendations, & Plan:  74 y.o. male with HTN, HLD,  & bradycardia    HTN-Elevated on  Norvasc, & Benicar. Will increase Norvasc to 5 mg twice a day    HLD-followed by PCP, on Zocor, no changes    Bradycardia- Asymptomatic. Monitor      Disposition - RTC in 6 weeks with Dr. Nadine Marion  or sooner if needed      Please do not hesitate to contact me for any questions or concerns.     Sincerely yours,    MARIE Abbott

## 2022-04-26 NOTE — PATIENT INSTRUCTIONS
Increase Norvasc to 5mg twice a day    Completing a Blood Pressure Log    Your doctor has recommended completing a blood pressure log to see what your blood pressure runs at home. Managing your blood pressure can be very beneficial in your overall health. Listed below are a few tips and instructions on how to check your blood pressure correctly. · Always check your blood pressure AFTER taking all blood pressure medications. Waiting about 2 hours gives the best results on how your medication is working. · Before checking your blood pressure come inside, sit comfortably for 5 to 10 minutes and then check your blood pressure. Your arm should rest comfortably, sit up straight with your back against the chair, legs uncrossed and feet touching the floor. · Your blood pressure will typically run higher when you have recently been excercising, smoking, or drinking caffiene and can give inaccurate readings, try to wait 30 minutes before checking your blood pressure. · Check your blood pressure in the mornings and at night for  3 days a week and write it down. After 2 to 3 weeks of gathering readings you can call, e-mail, or fax in your results. Our office number is [phone], our fax number is [phone], and you can always e-mail us via [employer] to send your results directly to your Doctor. Hypertension  (High Blood Pressure)  Most people with high blood pressure (hypertension) have no symptoms. High blood pressure can be a dangerous problem. Hypertension is dangerous because you may have it and not know it. High blood pressure may mean that your heart needs to work harder to pump blood. Your blood pressure is measured with 2 numbers. The first number is when your heart flexes (contracts), and the second number is when your heart relaxes. The higher the numbers are, the more you are at risk for problems. Write down your blood pressure today.  The best blood pressure for adults is 120/80 (mmHg) or lower. It is likely that your blood pressure was recorded at least 2 times today. It is important for you to give these numbers to your doctor. If you do not have a doctor, try to get follow-up care at a hospital or community clinic. You may need to start high blood pressure medicine. You may also need to adjust your medicines as told by your doctor. Even mild high blood pressure increases long-term health risks. One high reading does not mean you have hypertension. · Your blood pressure should be taken when you are relaxed. It is also important to sit for about 10 minutes before being tested. · Things that can increase your blood pressure are:  Injury, Illness, Stress, Caffeine, and some medicines (like decongestants). · High blood pressure does not usually need emergency treatment. HOME CARE  · Lifestyle and medicine changes may be needed, including:   · Weight loss. · Exercise. · Limit the use of salt. · Stop smoking. · If using decongestants or birth control pills, talk to your doctor. These medicines might make blood pressure higher. · Do not use street drugs. · Females should not drink more than 1 alcoholic drink per day. Males should not drink more than 2 alcoholic drinks per day. · Take your blood pressure medicine. You will need to take it every day. If you do not get treated, there are risks, including:   · Heart disease. · Stroke. · Kidney failure. · See your doctor as told. GET HELP RIGHT AWAY IF:  · You get a very bad headache. · You get blurred or changing vision. · You feel confused. · You feel weak, numb, or faint. · You get chest or belly (abdominal) pain. · You throw up (vomit). · You cannot breathe very well. If you have a blood pressure reading with a top number of 180 or higher, you need to see your doctor right away. This is especially true if you are having any of the problems listed above.

## 2022-06-07 ENCOUNTER — OFFICE VISIT (OUTPATIENT)
Dept: CARDIOLOGY CLINIC | Age: 67
End: 2022-06-07
Payer: MEDICARE

## 2022-06-07 VITALS
WEIGHT: 196 LBS | OXYGEN SATURATION: 97 % | DIASTOLIC BLOOD PRESSURE: 82 MMHG | HEIGHT: 68 IN | HEART RATE: 54 BPM | SYSTOLIC BLOOD PRESSURE: 130 MMHG | BODY MASS INDEX: 29.7 KG/M2

## 2022-06-07 DIAGNOSIS — R00.1 BRADYCARDIA: ICD-10-CM

## 2022-06-07 DIAGNOSIS — I10 ESSENTIAL HYPERTENSION: Primary | ICD-10-CM

## 2022-06-07 DIAGNOSIS — E78.5 HYPERLIPIDEMIA, UNSPECIFIED HYPERLIPIDEMIA TYPE: ICD-10-CM

## 2022-06-07 PROCEDURE — G8417 CALC BMI ABV UP PARAM F/U: HCPCS | Performed by: NURSE PRACTITIONER

## 2022-06-07 PROCEDURE — 99213 OFFICE O/P EST LOW 20 MIN: CPT | Performed by: NURSE PRACTITIONER

## 2022-06-07 PROCEDURE — G8427 DOCREV CUR MEDS BY ELIG CLIN: HCPCS | Performed by: NURSE PRACTITIONER

## 2022-06-07 PROCEDURE — 3017F COLORECTAL CA SCREEN DOC REV: CPT | Performed by: NURSE PRACTITIONER

## 2022-06-07 PROCEDURE — 1123F ACP DISCUSS/DSCN MKR DOCD: CPT | Performed by: NURSE PRACTITIONER

## 2022-06-07 PROCEDURE — 1036F TOBACCO NON-USER: CPT | Performed by: NURSE PRACTITIONER

## 2022-06-07 NOTE — PATIENT INSTRUCTIONS
Hypertension  (High Blood Pressure)  Most people with high blood pressure (hypertension) have no symptoms. High blood pressure can be a dangerous problem. Hypertension is dangerous because you may have it and not know it. High blood pressure may mean that your heart needs to work harder to pump blood. Your blood pressure is measured with 2 numbers. The first number is when your heart flexes (contracts), and the second number is when your heart relaxes. The higher the numbers are, the more you are at risk for problems. Write down your blood pressure today. The best blood pressure for adults is 120/80 (mmHg) or lower. It is likely that your blood pressure was recorded at least 2 times today. It is important for you to give these numbers to your doctor. If you do not have a doctor, try to get follow-up care at a hospital or community clinic. You may need to start high blood pressure medicine. You may also need to adjust your medicines as told by your doctor. Even mild high blood pressure increases long-term health risks. One high reading does not mean you have hypertension. · Your blood pressure should be taken when you are relaxed. It is also important to sit for about 10 minutes before being tested. · Things that can increase your blood pressure are:  Injury, Illness, Stress, Caffeine, and some medicines (like decongestants). · High blood pressure does not usually need emergency treatment. HOME CARE  · Lifestyle and medicine changes may be needed, including:   · Weight loss. · Exercise. · Limit the use of salt. · Stop smoking. · If using decongestants or birth control pills, talk to your doctor. These medicines might make blood pressure higher. · Do not use street drugs. · Females should not drink more than 1 alcoholic drink per day. Males should not drink more than 2 alcoholic drinks per day. · Take your blood pressure medicine. You will need to take it every day.  If you do not get treated, there are risks, including:   · Heart disease. · Stroke. · Kidney failure. · See your doctor as told. GET HELP RIGHT AWAY IF:  · You get a very bad headache. · You get blurred or changing vision. · You feel confused. · You feel weak, numb, or faint. · You get chest or belly (abdominal) pain. · You throw up (vomit). · You cannot breathe very well. If you have a blood pressure reading with a top number of 180 or higher, you need to see your doctor right away. This is especially true if you are having any of the problems listed above. Hyperlipidemia   (Dyslipidemia; High Triglycerides; Triglycerides, High)     Definition   Hyperlipidemia is a high level of fats in the blood. These fats, called lipids, include cholesterol and triglycerides. There are five types of hyperlipidemia. The type depends on which lipid in the blood is high. Causes   Causes may include:   A family history of hyperlipidemia   A diet high in total fat, saturated fat, or cholesterol   Obesity   Excess alcohol intake   Certain conditions, including:   Diabetes   Low thyroid   Kidney problems   Liver disease   Cushing's syndrome   Certain drugs, such as:   Hormones or birth control pills   Beta-blockers   Some diuretics   Cortisone drugs   Isotretinoin (for acne )   Some anti- HIV drugs   Risk Factors   These factors increase your chance of developing this condition. Tell your doctor if you have any of these:   Advancing age   Sex: male   Postmenopause   Lack of exercise   Smoking   Stress   Overuse of alcohol   Symptoms   Hyperlipidemia usually does not cause symptoms. Very high levels of lipids or triglycerides can cause:   Fat deposits in the skin or tendons ( xanthomas )   Pain, enlargement, or swelling of organs such as the liver, spleen, or pancreas ( pancreatitis )   Obstruction of blood vessels in heart and brain   Hyperlipidemia can increase your risk of atherosclerosis .  This is a dangerous hardening of the arteries. It can end up blocking blood flow. In some cases, this may result in:   Angina   Heart attack   Stroke   Other serious complications   Blood Vessel with Atherosclerosis       2011 [facility] [facility].   Diagnosis   This condition is diagnosed with blood tests. These tests measure the levels of lipids in the blood. The National Cholesterol Education Program advises that you have your lipids checked at least once every five years, starting at age 21. Also, the American Academy of Pediatrics recommends lipid screening for children at risk (eg, a family history of hyperlipidemia). Testing may consist of a fasting blood test for:   Total cholesterol   LDL (bad cholesterol)   HDL (good cholesterol)   Triglycerides   Your doctor may recommend more frequent or earlier testing if you have:   Family history of hyperlipidemia   Risk factor or disease that may cause hyperlipidemia   Complication that may result from hyperlipidemia   Treatment   Treatment is not only aimed at correcting your cholesterol levels, but also at lowering your overall risk for heart disease and strokes. Diet Changes   Eat a diet low in total fat, saturated fat, and cholesterol . Reduce or eliminate the amount of alcohol you drink. Eat more high-fiber foods. Lifestyle Changes   If you are overweight, lose weight . If you smoke, quit . Exercise regularly . Talk to you doctor before starting an exercise program. Tracie Sanders may already have hardening of the arteries or heart disease. These conditions increase your risk of having a heart attack while exercising. Make sure other medical conditions such as high blood pressure and diabetes are being treated and controlled. Medications   There are a number of drugs available, such as statins , to treat this condition and help lower your risk for heart disease. Talk to your doctor. Statins have been shown to reduce mortality (death), heart attacks , and stroke. These medicines are best used as additions to diet and exercise and should not replace healthy lifestyle changes. Prevention   To help reduce your chance of getting hyperlipidemia, take the following steps:   Starting at age 21, get cholesterol tests. Eat a diet low in total fat, saturated fat, and cholesterol. If you smoke, quit. Drink alcohol in moderation (two drinks per day for men, one drink per day for women). If you are overweight, lose weight. Exercise regularly. Talk with your doctor first.   If you have diabetes , control your blood sugar. Talk to your doctor about medications you are taking. They may have side effects that cause hyperlipidemia.      Last Reviewed: September 2010 Figueroa Dempsey DO   Updated: 11/9/2010

## 2022-06-07 NOTE — PROGRESS NOTES
Dear Dr. Brooke Chavarria MD,    Thank you for allowing me to participate in the care of Mr. Yuliya Cornelius. He presents today at the [address] [place] [address]. As you know, Mr. Kylah Peterson is a 74 y.o. male with history of hypertension, hyperlipidemia, diabetes, COPD, and DEEDEE-CPAP who presents with the chief complaint of follow-up for BP check and medication adjustment. At last visit, Norvasc was increased to twice a day. He tolerated well. He denies any exertional chest pain. He has DUCKWORTH which is chronic and stable for him. He denies any fast or slow heart rhythms. he is sleeping on 1 pillow at night. he is not waking gasping for air. he denies any swelling. he has been compliant with his medications. his BP has been controlled. PCP follows labs and lipids. He otherwise denies chest pain, PND, orthopnea, syncope or near syncope. He has no other complaints. Review of Systems   Constitutional: Positive for fatigue (stable). Negative for fever, chills, diaphoresis, activity change, appetite change, and unexpected weight change. Eyes: Negative for photophobia, pain, redness and visual disturbance. Respiratory: Positive for DUCKWORTH (chronic, stable). Negative for apnea, cough, chest tightness, wheezing and stridor. Cardiovascular:  Negative for chest pain, palpitations and leg swelling. Gastrointestinal: Negative for abdominal distention. Genitourinary: Negative for dysuria, urgency and frequency. Musculoskeletal: Negative for myalgias, arthralgias and gait problem. Skin: Negative for color change, pallor, rash and wound. Neurological: Negative for dizziness, tremors, speech difficulty, weakness and numbness. Hematological: Does not bruise/bleed easily. Psychiatric/Behavioral: Negative.         Past Medical History:   Diagnosis Date    Hypertension        Past Surgical History:   Procedure Laterality Date    CATARACT REMOVAL WITH IMPLANT Bilateral 12/2021       Family History   Problem Relation Age of Onset    Stroke Mother     Heart Attack Maternal Grandfather        Social History     Socioeconomic History    Marital status:      Spouse name: Not on file    Number of children: Not on file    Years of education: Not on file    Highest education level: Not on file   Occupational History    Not on file   Tobacco Use    Smoking status: Former Smoker     Packs/day: 2.00     Years: 50.00     Pack years: 100.00     Types: Cigarettes     Quit date: 2021     Years since quittin.0    Smokeless tobacco: Never Used   Vaping Use    Vaping Use: Never used   Substance and Sexual Activity    Alcohol use: Yes     Comment: rarely    Drug use: Never    Sexual activity: Yes     Partners: Female   Other Topics Concern    Not on file   Social History Narrative    Not on file     Social Determinants of Health     Financial Resource Strain:     Difficulty of Paying Living Expenses: Not on file   Food Insecurity:     Worried About [address] in the Last Year: Not on file    [address] in the Last Year: Not on file   Transportation Needs:     Lack of Transportation (Medical): Not on file    Lack of Transportation (Non-Medical):  Not on file   Physical Activity:     Days of Exercise per Week: Not on file    Minutes of Exercise per Session: Not on file   Stress:     Feeling of Stress : Not on file   Social Connections:     Frequency of Communication with Friends and Family: Not on file    Frequency of Social Gatherings with Friends and Family: Not on file    Attends Yarsanism Services: Not on file    Active Member of Clubs or Organizations: Not on file    Attends Club or Organization Meetings: Not on file    Marital Status: Not on file   Intimate Partner Violence:     Fear of Current or Ex-Partner: Not on file    Emotionally Abused: Not on file    Physically Abused: Not on file    Sexually Abused: Not on file   Housing Stability:     Unable to Pay for Housing in the Last Year: Not on file    Number of Places Lived in the Last Year: Not on file    Unstable Housing in the Last Year: Not on file       No Known Allergies      Current Outpatient Medications:     JARDIANCE 10 MG tablet, Take 10 mg by mouth daily, Disp: , Rfl:     amLODIPine (NORVASC) 5 MG tablet, Take 1 tablet by mouth 2 times daily, Disp: 180 tablet, Rfl: 3    aspirin 81 MG EC tablet, Take 81 mg by mouth daily, Disp: , Rfl:     glimepiride (AMARYL) 2 MG tablet, Take 2 mg by mouth 2 times daily , Disp: , Rfl:     omeprazole (PRILOSEC) 20 MG delayed release capsule, Take 20 mg by mouth daily, Disp: , Rfl:     olmesartan (BENICAR) 40 MG tablet, Take 40 mg by mouth daily, Disp: , Rfl:     simvastatin (ZOCOR) 20 MG tablet, Take 20 mg by mouth nightly, Disp: , Rfl:     varenicline (CHANTIX) 0.5 MG tablet, Take 0.5 mg by mouth 2 times daily  (Patient not taking: Reported on 6/7/2022), Disp: , Rfl:     PE:  Vitals:    06/07/22 1018   BP: 130/82   Pulse: 54   SpO2: 97%       Estimated body mass index is 29.8 kg/m² as calculated from the following:    Height as of this encounter: 5' 8\" (1.727 m). Weight as of this encounter: 196 lb (88.9 kg). Constitutional: He is oriented to person, place, and time. He appears well-developed and well-nourished in no acute distress. Neck:  Neck supple without JVD present. No trachea deviation present. No thyromegaly present. Eyes:Conjunctivae and EOM are normal. Pupils equal and reactive to light. ENT:Hearing appears normal.conjunctiva and lids are normal, ears and nose appear normal.  Cardiovascular: Normal rate, Normal rhythm, normal heart sounds. 1/6 murmur ascultated. No gallop and no friction rub. No carotid bruits. No peripheral edema. Pulmonary/Chest:  Lungs clear to auscultation bilaterally without evidence of respiratory distress. He without wheezes. He without rales or ronchi. Musculoskeletal: Normal range of motion.   Gait is normal.  Head is normocephalic and atraumatic. Skin: Skin is warm and dry without rash or pallor. Psychiatric:He is alert and oriented to person, place, and time. He has a normal mood and affect. His behavior is normal. Thought content normal.       No results found for: CREATININE, HGB, PROBNP      Assessment, Recommendations, & Plan:  74 y.o. male with HTN, HLD,  & bradycardia    HTN-Controlled on  Norvasc, & Benicar. Continue Current regimen    HLD-followed by PCP, on Zocor, no changes    Bradycardia- Asymptomatic. Monitor      Disposition - RTC in 6 months with Dr. Germán Amin  or sooner if needed      Please do not hesitate to contact me for any questions or concerns.     Sincerely yours,    MARIE Londono

## 2022-09-29 ENCOUNTER — TELEPHONE (OUTPATIENT)
Dept: NEUROLOGY | Age: 67
End: 2022-09-29

## 2022-10-06 ENCOUNTER — TELEPHONE (OUTPATIENT)
Dept: ADMINISTRATIVE | Age: 67
End: 2022-10-06

## 2022-10-06 NOTE — TELEPHONE ENCOUNTER
Wife called in re: to NP with [facility]. Wife works Wednesdays and is calling to reschedule. She would like pt. To see Dr. Brooklyn Altman instead.

## 2022-10-10 NOTE — TELEPHONE ENCOUNTER
I have called and left patient wife a VM to let her know that I have her  scheduled with Dr. Brooklyn Altman. Per patient wife request. Left on VM the appointment time/date.

## 2022-10-28 ENCOUNTER — OFFICE VISIT (OUTPATIENT)
Dept: NEUROLOGY | Age: 67
End: 2022-10-28
Payer: MEDICARE

## 2022-10-28 VITALS
HEIGHT: 68 IN | BODY MASS INDEX: 29.7 KG/M2 | HEART RATE: 66 BPM | WEIGHT: 196 LBS | DIASTOLIC BLOOD PRESSURE: 68 MMHG | OXYGEN SATURATION: 97 % | SYSTOLIC BLOOD PRESSURE: 118 MMHG

## 2022-10-28 DIAGNOSIS — R41.3 MEMORY LOSS: Primary | ICD-10-CM

## 2022-10-28 DIAGNOSIS — Z86.39 HISTORY OF DIABETES MELLITUS: ICD-10-CM

## 2022-10-28 DIAGNOSIS — R41.3 MEMORY LOSS: ICD-10-CM

## 2022-10-28 DIAGNOSIS — Z86.39 HISTORY OF HYPERLIPIDEMIA: ICD-10-CM

## 2022-10-28 PROBLEM — R04.2 HEMOPTYSIS: Status: ACTIVE | Noted: 2019-10-15

## 2022-10-28 PROBLEM — G47.33 OSA (OBSTRUCTIVE SLEEP APNEA): Status: ACTIVE | Noted: 2019-10-15

## 2022-10-28 PROBLEM — R00.1 BRADYCARDIA: Status: ACTIVE | Noted: 2019-10-15

## 2022-10-28 PROBLEM — J98.4 RESTRICTIVE LUNG DISEASE: Status: ACTIVE | Noted: 2019-10-15

## 2022-10-28 PROBLEM — E66.3 OVERWEIGHT: Status: ACTIVE | Noted: 2019-10-15

## 2022-10-28 PROBLEM — F17.210 CIGARETTE NICOTINE DEPENDENCE WITHOUT COMPLICATION: Status: ACTIVE | Noted: 2019-10-15

## 2022-10-28 LAB
TSH REFLEX FT4: 1.66 UIU/ML (ref 0.35–5.5)
VITAMIN B-12: 371 PG/ML (ref 211–946)

## 2022-10-28 PROCEDURE — G8417 CALC BMI ABV UP PARAM F/U: HCPCS | Performed by: PSYCHIATRY & NEUROLOGY

## 2022-10-28 PROCEDURE — G8484 FLU IMMUNIZE NO ADMIN: HCPCS | Performed by: PSYCHIATRY & NEUROLOGY

## 2022-10-28 PROCEDURE — 3017F COLORECTAL CA SCREEN DOC REV: CPT | Performed by: PSYCHIATRY & NEUROLOGY

## 2022-10-28 PROCEDURE — G8427 DOCREV CUR MEDS BY ELIG CLIN: HCPCS | Performed by: PSYCHIATRY & NEUROLOGY

## 2022-10-28 PROCEDURE — 99204 OFFICE O/P NEW MOD 45 MIN: CPT | Performed by: PSYCHIATRY & NEUROLOGY

## 2022-10-28 PROCEDURE — 1036F TOBACCO NON-USER: CPT | Performed by: PSYCHIATRY & NEUROLOGY

## 2022-10-28 PROCEDURE — 1123F ACP DISCUSS/DSCN MKR DOCD: CPT | Performed by: PSYCHIATRY & NEUROLOGY

## 2022-10-28 RX ORDER — ISOPROPYL ALCOHOL 70 ML/100ML
SWAB TOPICAL
COMMUNITY
Start: 2022-10-18

## 2022-10-28 RX ORDER — BLOOD SUGAR DIAGNOSTIC
STRIP MISCELLANEOUS
COMMUNITY
Start: 2022-08-10

## 2022-10-28 RX ORDER — BLOOD GLUCOSE CONTROL HIGH,LOW
EACH MISCELLANEOUS
COMMUNITY
Start: 2022-08-08

## 2022-10-28 RX ORDER — LANCETS
EACH MISCELLANEOUS
COMMUNITY
Start: 2022-10-09

## 2022-10-28 NOTE — PROGRESS NOTES

## 2022-10-28 NOTE — PROGRESS NOTES
Chief Complaint   Patient presents with    New Patient     Livia Briseno is a 69y.o. year old male who is seen for evaluation of his poor short-term memory. He is here today with his wife. He complains of forgetfulness for years but over the past year or more it has been progressively worse. He has difficulty remembering why he walked into the next room. He forgets weekly meetings that he should remember. He has had at least once where he could not remember where he was while he was out driving. His father and grandmother appeared to have dementia. Patient denies any focal neurological difficulties. He does have diabetes, hypertension and hyperlipidemia. Denies any change in his memory with any of his medications. .    Active Ambulatory Problems     Diagnosis Date Noted    Bradycardia 10/15/2019    Cigarette nicotine dependence without complication     Hemoptysis 10/15/2019    DEEDEE (obstructive sleep apnea) 10/15/2019    Overweight 10/15/2019    Restrictive lung disease 10/15/2019     Resolved Ambulatory Problems     Diagnosis Date Noted    No Resolved Ambulatory Problems     Past Medical History:   Diagnosis Date    Hypertension        Past Surgical History:   Procedure Laterality Date    CATARACT REMOVAL WITH IMPLANT Bilateral 2021       Family History   Problem Relation Age of Onset    Stroke Mother     Heart Attack Maternal Grandfather        No Known Allergies    Social History     Socioeconomic History    Marital status:      Spouse name: Not on file    Number of children: Not on file    Years of education: Not on file    Highest education level: Not on file   Occupational History    Not on file   Tobacco Use    Smoking status: Former     Packs/day: 2.00     Years: 50.00     Pack years: 100.00     Types: Cigarettes     Quit date: 2021     Years since quittin.4    Smokeless tobacco: Never   Vaping Use    Vaping Use: Never used   Substance and Sexual Activity    Alcohol use: Yes     Comment: rarely    Drug use: Never    Sexual activity: Yes     Partners: Female   Other Topics Concern    Not on file   Social History Narrative    Not on file     Social Determinants of Health     Financial Resource Strain: Not on file   Food Insecurity: Not on file   Transportation Needs: Not on file   Physical Activity: Not on file   Stress: Not on file   Social Connections: Not on file   Intimate Partner Violence: Not on file   Housing Stability: Not on file       Review of Systems  Constitutional: []Fever []Sweats []Chills [] Recent Injury   [x] Denies all unless marked  HENT:[]Headache  [] Head Injury  [] Sore Throat  [] Ear Pain  [] Dizziness [] Hearing Loss   [x] Denies all unless marked  Spine:  [] Neck pain  [] Back pain  [] Sciatica  [x] Denies all unless marked  Cardiovascular:[]Chest Pain []Palpitations [] Heart Disease  [x] Denies all unless marked  Pulmonary: []Shortness of Breath []Cough   [x] Denies all unless marked  Gastrointestinal:  []Abdominal Pain  []Blood in Stool  []Diarrhea []Constipation []Nausea  []Vomiting  [x] Denies all unless marked  Genitourinary:  [] Dysuria [] Frequency  [] Incontinence [] Urgency   [x] Denies all unless marked  Musculoskeletal: [] Arthralgia  [] Myalgias [] Muscle cramps  [] Muscle twitches   [x] Denies all unless marked   Extremities:   [] Pain   [] Swelling   [x] Denies all unless marked  Skin:[] Rash  [] Color Change  [x] Denies all unless marked  Neurological:[] Visual Disturbance [] Double Vision [] Slurred Speech [] Trouble swallowing  [] Vertigo [] Tingling [] Numbness [] Weakness [] Loss of Balance   [] Loss of Consciousness [] Memory Loss [] Seizures  [x] Denies all unless marked  Psychiatric/Behavioral:[] Depression [] Anxiety  [x] Denies all unless marked  Sleep: []  Insomnia [] Sleep Disturbance [] Snoring [] Restless Legs [] Daytime Sleepiness [] Sleep Apnea  [x] Denies all unless marked         Current Outpatient Medications   Medication Sig Dispense Refill    metFORMIN (GLUCOPHAGE) 500 MG tablet Take 1 tablet by mouth 2 times daily      Accu-Chek Softclix Lancets MISC       ACCU-CHEK MAUREEN PLUS strip       Blood Glucose Calibration (ACCU-CHEK MAUREEN) SOLN       Alcohol Swabs (DROPSAFE ALCOHOL PREP) 70 % PADS       JARDIANCE 10 MG tablet Take 10 mg by mouth daily      amLODIPine (NORVASC) 5 MG tablet Take 1 tablet by mouth 2 times daily (Patient taking differently: Take 5 mg by mouth daily) 180 tablet 3    aspirin 81 MG EC tablet Take 81 mg by mouth daily      glimepiride (AMARYL) 2 MG tablet Take 2 mg by mouth 2 times daily       omeprazole (PRILOSEC) 20 MG delayed release capsule Take 20 mg by mouth daily      olmesartan (BENICAR) 40 MG tablet Take 40 mg by mouth daily      simvastatin (ZOCOR) 20 MG tablet Take 20 mg by mouth nightly       No current facility-administered medications for this visit.        Outpatient Medications Marked as Taking for the 10/28/22 encounter (Office Visit) with Sohail Rayo MD   Medication Sig Dispense Refill    metFORMIN (GLUCOPHAGE) 500 MG tablet Take 1 tablet by mouth 2 times daily      Accu-Chek Softclix Lancets MISC       ACCU-CHEK MAUREEN PLUS strip       Blood Glucose Calibration (ACCU-CHEK MAUREEN) SOLN       Alcohol Swabs (DROPSAFE ALCOHOL PREP) 70 % PADS       JARDIANCE 10 MG tablet Take 10 mg by mouth daily      amLODIPine (NORVASC) 5 MG tablet Take 1 tablet by mouth 2 times daily (Patient taking differently: Take 5 mg by mouth daily) 180 tablet 3    aspirin 81 MG EC tablet Take 81 mg by mouth daily      glimepiride (AMARYL) 2 MG tablet Take 2 mg by mouth 2 times daily       omeprazole (PRILOSEC) 20 MG delayed release capsule Take 20 mg by mouth daily      olmesartan (BENICAR) 40 MG tablet Take 40 mg by mouth daily      simvastatin (ZOCOR) 20 MG tablet Take 20 mg by mouth nightly         /68   Pulse 66   Ht 5' 8\" (1.727 m)   Wt 196 lb (88.9 kg)   SpO2 97%   BMI 29.80 kg/m² Constitutional - well developed, well nourished. Eyes - conjunctiva normal.  Pupils react to light  Ear, nose, throat -hearing intact to finger rub No scars, masses, or lesions over external nose or ears, no atrophy of tongue  Neck-symmetric, no masses noted, no jugular vein distension. No bruits noted. Respiration- chest wall appears symmetric, good expansion,   normal effort without use of accessory muscles  Cardiovascular- RRR  Musculoskeletal - no significant wasting of muscles noted, no bony deformities, gait no gross ataxia  Extremities-no clubbing, cyanosis or edema  Skin - warm, dry, and intact. No rash, erythema, or pallor. Psychiatric - mood, affect, and behavior appear normal.      Neurological exam  Awake, alert, fluent oriented x 3 appropriate affect  Attention and concentration appear appropriate. Good remote memory. Short-term memory 2/4 at 5 minutes. Normal clock drawing. Followed complex commands well. Speech normal without dysarthria  No clear issues with language of fund of knowledge    Cranial Nerve Exam   CN II- Visual fields grossly unremarkable. VA adequate. Discs sharp  CN III, IV,VI- PERRLA, EOMI, No nystagmus, conjugate eye movements, no ptosis  CN V-sensation intact to LT over face  CN VII-no facial asymmetry  CN VIII-Hearing intact   CN IX and X- Palate elevates in midline  CN XI-good shoulder shrug  CN XII-Tongue midline with no fasciculations or fibrillations    Motor Exam  V/V throughout upper and lower extremities bilaterally, no cogwheeling, normal tone    Sensory Exam decreased vibration and pinprick in the toes    Reflexes trace at the knees and otherwise absent. No Babinski. Tremors- no tremors in hands or head noted    Gait  Normal base and speed  No ataxia. No Romberg sign    Coordination  Finger to nose and MILLY-unremarkable      Assessment    ICD-10-CM    1.  Memory loss  R41.3 MRI BRAIN WO CONTRAST     Vitamin B12     TSH with Reflex to FT4 Neuropsychological testing      2. History of diabetes mellitus  Z86.39       3. History of hyperlipidemia  Z86.39           The patient does exhibit short-term memory compromise on exam but otherwise did well. 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 Could have early Alzheimer's disease. He denies excessive stressors. Check MRI of the brain, B12/TSH and computerized neuropsychological testing. Follow-up here in 1 month. Plan  Orders Placed This Encounter   Procedures    MRI BRAIN WO CONTRAST     Standing Status:   Future     Standing Expiration Date:   10/28/2023     Order Specific Question:   Reason for exam:     Answer:   memory loss/dementia    Vitamin B12     Standing Status:   Future     Number of Occurrences:   1     Standing Expiration Date:   10/28/2023    TSH with Reflex to FT4     Standing Status:   Future     Number of Occurrences:   1     Standing Expiration Date:   10/28/2023    Neuropsychological testing     Standing Status:   Future     Standing Expiration Date:   11/28/2022         Return in about 4 weeks (around 11/25/2022).     (Please note that portions of this note were completed with a voice recognition program. Efforts were made to edit the dictations but occasionally words are mis-transcribed.)

## 2022-11-03 ENCOUNTER — HOSPITAL ENCOUNTER (OUTPATIENT)
Dept: MRI IMAGING | Age: 67
Discharge: HOME | End: 2022-11-03
Payer: MEDICARE

## 2022-11-03 DIAGNOSIS — R41.3 MEMORY LOSS: ICD-10-CM

## 2022-11-03 PROCEDURE — 70551 MRI BRAIN STEM W/O DYE: CPT

## 2022-12-01 ENCOUNTER — OFFICE VISIT (OUTPATIENT)
Dept: NEUROLOGY | Age: 67
End: 2022-12-01

## 2022-12-01 VITALS
OXYGEN SATURATION: 97 % | HEART RATE: 50 BPM | SYSTOLIC BLOOD PRESSURE: 147 MMHG | HEIGHT: 68 IN | DIASTOLIC BLOOD PRESSURE: 78 MMHG | WEIGHT: 203 LBS | BODY MASS INDEX: 30.77 KG/M2

## 2022-12-01 DIAGNOSIS — Z86.39 HISTORY OF DIABETES MELLITUS: ICD-10-CM

## 2022-12-01 DIAGNOSIS — Z86.39 HISTORY OF HYPERLIPIDEMIA: ICD-10-CM

## 2022-12-01 DIAGNOSIS — R41.3 MEMORY LOSS: Primary | ICD-10-CM

## 2022-12-01 RX ORDER — ONDANSETRON 4 MG/1
TABLET, FILM COATED ORAL
COMMUNITY
Start: 2022-11-30

## 2022-12-01 NOTE — PROGRESS NOTES
REVIEW OF SYSTEMS    Constitutional: []Fever []Sweat []Chills [] Recent Injury [x] Denies all unless marked  HEENT:[]Headache  [] Head Injury/Hearing Loss  [] Sore Throat  [] Ear Ache/Dizziness  [x] Denies all unless marked  Spine:  [] Neck pain  [] Back pain  [] Sciaticia  [x] Denies all unless marked  Cardiovascular:[]Heart Disease []Chest Pain [] Palpitations  [x] Denies all unless marked  Pulmonary: []Shortness of Breath []Cough   [x] Denies all unless marke  Gastrointestinal: []Nausea  []Vomiting  []Abdominal Pain  []Constipation  []Diarrhea  []Dark Bloody Stools  [x] Denies all unless marked  Psychiatric/Behavioral:[] Depression [] Anxiety [x] Denies all unless marked  Genitourinary:   [] Frequency  [] Urgency  [] Incontinence [] Pain with Urination  [x] Denies all unless marked  Extremities: []Pain  [x]Swelling  [x] Denies all unless marked  Musculoskeletal: [] Muscle Pain  [] Joint Pain  [] Arthritis [] Muscle Cramps [] Muscle Twitches  [x] Denies all unless marked  Sleep: [] Insomnia [] Snoring [] Restless Legs [] Sleep Apnea  [] Daytime Sleepiness  [x] Denies all unless marked  Skin:[] Rash [] Skin Discoloration [x] Denies all unless marked   Neurological: [x]Visual Disturbance/Memory Loss [] Loss of Balance [] Slurred Speech/Weakness [] Seizures  [] Vertigo/Dizziness [x] Denies all unless marked

## 2022-12-01 NOTE — PROGRESS NOTES
Chief Complaint   Patient presents with    Follow-up     Memory loss       Lord Hobbs is a 69y.o. year old male who is seen for evaluation of his poor short-term memory. He is here today with his wife. He complains of forgetfulness for years but over the past year or more it has been progressively worse. He has difficulty remembering why he walked into the next room. He forgets weekly meetings that he should remember. He has had at least once where he could not remember where he was while he was out driving. His father and grandmother appeared to have dementia. Patient denies any focal neurological difficulties. He does have diabetes, hypertension and hyperlipidemia. Denies any change in his memory with any of his medications. .  Recent computerized neuropsychological testing came back within normal limits. More than 31 minutes were spent in the review, preparation and interpretation of this. MRI the brain showing minimal small vessel ischemic change.   TSH and B12 normal.  Active Ambulatory Problems     Diagnosis Date Noted    Bradycardia 10/15/2019    Cigarette nicotine dependence without complication 36/19/3428    Hemoptysis 10/15/2019    DEEDEE (obstructive sleep apnea) 10/15/2019    Overweight 10/15/2019    Restrictive lung disease 10/15/2019     Resolved Ambulatory Problems     Diagnosis Date Noted    No Resolved Ambulatory Problems     Past Medical History:   Diagnosis Date    Hypertension        Past Surgical History:   Procedure Laterality Date    CATARACT REMOVAL WITH IMPLANT Bilateral 12/2021       Family History   Problem Relation Age of Onset    Stroke Mother     Heart Attack Maternal Grandfather        Allergies   Allergen Reactions    Metoprolol Swelling       Social History     Socioeconomic History    Marital status:      Spouse name: Not on file    Number of children: Not on file    Years of education: Not on file    Highest education level: Not on file   Occupational History Not on file   Tobacco Use    Smoking status: Former     Packs/day: 2.00     Years: 50.00     Pack years: 100.00     Types: Cigarettes     Quit date: 2021     Years since quittin.5    Smokeless tobacco: Never   Vaping Use    Vaping Use: Never used   Substance and Sexual Activity    Alcohol use: Yes     Comment: rarely    Drug use: Never    Sexual activity: Yes     Partners: Female   Other Topics Concern    Not on file   Social History Narrative    Not on file     Social Determinants of Health     Financial Resource Strain: Not on file   Food Insecurity: Not on file   Transportation Needs: Not on file   Physical Activity: Not on file   Stress: Not on file   Social Connections: Not on file   Intimate Partner Violence: Not on file   Housing Stability: Not on file       Review of Systems  Constitutional: []Fever []Sweat []Chills [] Recent Injury [x] Denies all unless marked  HEENT:[]Headache  [] Head Injury/Hearing Loss  [] Sore Throat  [] Ear Ache/Dizziness  [x] Denies all unless marked  Spine:  [] Neck pain  [] Back pain  [] Sciaticia  [x] Denies all unless marked  Cardiovascular:[]Heart Disease []Chest Pain [] Palpitations  [x] Denies all unless marked  Pulmonary: []Shortness of Breath []Cough   [x] Denies all unless marke  Gastrointestinal: []Nausea  []Vomiting  []Abdominal Pain  []Constipation  []Diarrhea  []Dark Bloody Stools  [x] Denies all unless marked  Psychiatric/Behavioral:[] Depression [] Anxiety [x] Denies all unless marked  Genitourinary:   [] Frequency  [] Urgency  [] Incontinence [] Pain with Urination  [x] Denies all unless marked  Extremities: []Pain  [x]Swelling  [x] Denies all unless marked  Musculoskeletal: [] Muscle Pain  [] Joint Pain  [] Arthritis [] Muscle Cramps [] Muscle Twitches  [x] Denies all unless marked  Sleep: [] Insomnia [] Snoring [] Restless Legs [] Sleep Apnea  [] Daytime Sleepiness  [x] Denies all unless marked  Skin:[] Rash [] Skin Discoloration [x] Denies all unless marked   Neurological: [x]Visual Disturbance/Memory Loss [] Loss of Balance [] Slurred Speech/Weakness [] Seizures  [] Vertigo/Dizziness [x] Denies all unless marked            Current Outpatient Medications   Medication Sig Dispense Refill    metFORMIN (GLUCOPHAGE) 500 MG tablet Take 2 tablets by mouth 2 times daily      Accu-Chek Softclix Lancets MISC       ACCU-CHEK MAUREEN PLUS strip       Blood Glucose Calibration (ACCU-CHEK MAUREEN) SOLN       Alcohol Swabs (DROPSAFE ALCOHOL PREP) 70 % PADS       amLODIPine (NORVASC) 5 MG tablet Take 1 tablet by mouth 2 times daily (Patient taking differently: Take 5 mg by mouth daily) 180 tablet 3    aspirin 81 MG EC tablet Take 81 mg by mouth daily      glimepiride (AMARYL) 2 MG tablet Take 2 mg by mouth 2 times daily       omeprazole (PRILOSEC) 20 MG delayed release capsule Take 20 mg by mouth daily      olmesartan (BENICAR) 40 MG tablet Take 40 mg by mouth daily      simvastatin (ZOCOR) 20 MG tablet Take 20 mg by mouth nightly      ondansetron (ZOFRAN) 4 MG tablet       JARDIANCE 10 MG tablet Take 10 mg by mouth daily (Patient not taking: Reported on 12/1/2022)       No current facility-administered medications for this visit.        Outpatient Medications Marked as Taking for the 12/1/22 encounter (Office Visit) with Sohail Rayo MD   Medication Sig Dispense Refill    metFORMIN (GLUCOPHAGE) 500 MG tablet Take 2 tablets by mouth 2 times daily      Accu-Chek Softclix Lancets MISC       ACCU-CHEK MAUREEN PLUS strip       Blood Glucose Calibration (ACCU-CHEK MAUREEN) SOLN       Alcohol Swabs (DROPSAFE ALCOHOL PREP) 70 % PADS       amLODIPine (NORVASC) 5 MG tablet Take 1 tablet by mouth 2 times daily (Patient taking differently: Take 5 mg by mouth daily) 180 tablet 3    aspirin 81 MG EC tablet Take 81 mg by mouth daily      glimepiride (AMARYL) 2 MG tablet Take 2 mg by mouth 2 times daily       omeprazole (PRILOSEC) 20 MG delayed release capsule Take 20 mg by mouth daily olmesartan (BENICAR) 40 MG tablet Take 40 mg by mouth daily      simvastatin (ZOCOR) 20 MG tablet Take 20 mg by mouth nightly         BP (!) 147/78   Pulse 50   Ht 5' 8\" (1.727 m)   Wt 203 lb (92.1 kg)   SpO2 97%   BMI 30.87 kg/m²       Constitutional - well developed, well nourished. Eyes - conjunctiva normal.  Pupils react to light  Ear, nose, throat -hearing intact to finger rub No scars, masses, or lesions over external nose or ears, no atrophy of tongue  Neck-symmetric, no masses noted, no jugular vein distension. No bruits noted. Respiration- chest wall appears symmetric, good expansion,   normal effort without use of accessory muscles  Cardiovascular- RRR  Musculoskeletal - no significant wasting of muscles noted, no bony deformities, gait no gross ataxia  Extremities-no clubbing, cyanosis or edema  Skin - warm, dry, and intact. No rash, erythema, or pallor. Psychiatric - mood, affect, and behavior appear normal.      Neurological exam  Awake, alert, fluent oriented x 3 appropriate affect  Attention and concentration appear appropriate. Good remote memory. Short-term memory 2/4 at 5 minutes. Normal clock drawing. Followed complex commands well. Speech normal without dysarthria  No clear issues with language of fund of knowledge    Cranial Nerve Exam   CN II- Visual fields grossly unremarkable. VA adequate. Discs sharp  CN III, IV,VI- PERRLA, EOMI, No nystagmus, conjugate eye movements, no ptosis  CN V-sensation intact to LT over face  CN VII-no facial asymmetry  CN VIII-Hearing intact   CN IX and X- Palate elevates in midline  CN XI-good shoulder shrug  CN XII-Tongue midline with no fasciculations or fibrillations    Motor Exam  V/V throughout upper and lower extremities bilaterally, no cogwheeling, normal tone      Reflexes trace at the knees and otherwise absent. No Babinski. Tremors- no tremors in hands or head noted    Gait  Normal base and speed  No ataxia.  No Romberg sign    Coordination  Finger to nose and MILLY-unremarkable      Assessment    ICD-10-CM    1. Memory loss  R41.3       2. History of diabetes mellitus  Z86.39       3. History of hyperlipidemia  Z86.39             The patient does exhibit short-term memory compromise on exam but otherwise did well. 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 Computerized neuropsychological testing within normal limits. Patient reassured. I have recommended more exercise and reading and a reassessment in about 6 months. Hyperlipidemia and diabetes are treated. Plan          Return in about 6 months (around 6/1/2023).     (Please note that portions of this note were completed with a voice recognition program. Efforts were made to edit the dictations but occasionally words are mis-transcribed.)

## 2022-12-12 ENCOUNTER — OFFICE VISIT (OUTPATIENT)
Dept: CARDIOLOGY CLINIC | Age: 67
End: 2022-12-12
Payer: MEDICARE

## 2022-12-12 VITALS
OXYGEN SATURATION: 98 % | DIASTOLIC BLOOD PRESSURE: 76 MMHG | HEART RATE: 50 BPM | HEIGHT: 68 IN | SYSTOLIC BLOOD PRESSURE: 136 MMHG | WEIGHT: 199 LBS | BODY MASS INDEX: 30.16 KG/M2

## 2022-12-12 DIAGNOSIS — I10 ESSENTIAL HYPERTENSION: Primary | ICD-10-CM

## 2022-12-12 PROCEDURE — 3074F SYST BP LT 130 MM HG: CPT | Performed by: INTERNAL MEDICINE

## 2022-12-12 PROCEDURE — 1123F ACP DISCUSS/DSCN MKR DOCD: CPT | Performed by: INTERNAL MEDICINE

## 2022-12-12 PROCEDURE — G8417 CALC BMI ABV UP PARAM F/U: HCPCS | Performed by: INTERNAL MEDICINE

## 2022-12-12 PROCEDURE — G8484 FLU IMMUNIZE NO ADMIN: HCPCS | Performed by: INTERNAL MEDICINE

## 2022-12-12 PROCEDURE — 93000 ELECTROCARDIOGRAM COMPLETE: CPT | Performed by: INTERNAL MEDICINE

## 2022-12-12 PROCEDURE — 3078F DIAST BP <80 MM HG: CPT | Performed by: INTERNAL MEDICINE

## 2022-12-12 PROCEDURE — 99214 OFFICE O/P EST MOD 30 MIN: CPT | Performed by: INTERNAL MEDICINE

## 2022-12-12 PROCEDURE — 3017F COLORECTAL CA SCREEN DOC REV: CPT | Performed by: INTERNAL MEDICINE

## 2022-12-12 PROCEDURE — G8427 DOCREV CUR MEDS BY ELIG CLIN: HCPCS | Performed by: INTERNAL MEDICINE

## 2022-12-12 PROCEDURE — 1036F TOBACCO NON-USER: CPT | Performed by: INTERNAL MEDICINE

## 2022-12-12 ASSESSMENT — ENCOUNTER SYMPTOMS
NAUSEA: 0
SORE THROAT: 0
BLOOD IN STOOL: 0
ABDOMINAL DISTENTION: 0
FACIAL SWELLING: 0
CHEST TIGHTNESS: 0
EYE PAIN: 0
DIARRHEA: 0
EYE DISCHARGE: 0
APNEA: 0
WHEEZING: 0
CONSTIPATION: 0
COUGH: 0
VOMITING: 0
SHORTNESS OF BREATH: 0
ABDOMINAL PAIN: 0
EYE REDNESS: 0

## 2022-12-12 NOTE — PROGRESS NOTES
Cardiology Office Visit Note  [facility]  [id number]  Phone: [phone]  Fax: [phone]                            Date:  12/12/2022  Patient: Maria M Scott  Age:  69 y.o., 1955    Referral: No ref. provider found    REASON FOR VISIT:  Follow-up (Essential hypertension)         PROBLEM LIST:    Patient Active Problem List    Diagnosis Date Noted    Bradycardia 10/15/2019     Priority: Medium    Cigarette nicotine dependence without complication 85/44/6075     Priority: Medium    Hemoptysis 10/15/2019     Priority: Medium    DEEDEE (obstructive sleep apnea) 10/15/2019     Priority: Medium    Overweight 10/15/2019     Priority: Medium    Restrictive lung disease 10/15/2019     Priority: Medium         PRESENTATION: Maria M Scott is a 69y.o. year old male is seen today for a routine cardiology visit. His past medical history significant for essential hypertension, dyslipidemia, diabetes mellitus, COPD and obstructive sleep apnea on CPAP. His last office visit was in June of this year. He denies any new or worsening cardiac symptoms. He has not been in the hospital or emergency room since her last visit. He has no new medical diagnoses. He is compliant with his medications. He informs me that his CPAP may not be working well as he does continue to have daytime fatigue with 2-3 daytime naps and occasional memory fog. His last nuclear medicine stress test was in June 2021 which at that time showed ejection fraction 65% without obvious infarct or ischemia. REVIEW OF SYSTEMS:  Review of Systems   Constitutional:  Positive for fatigue. Negative for chills and fever. HENT:  Negative for congestion, facial swelling, hearing loss and sore throat. Eyes:  Negative for pain, discharge, redness and visual disturbance. Respiratory:  Negative for apnea, cough, chest tightness, shortness of breath and wheezing.     Cardiovascular:  Negative for chest pain, palpitations and leg swelling. Gastrointestinal:  Negative for abdominal distention, abdominal pain, blood in stool, constipation, diarrhea, nausea and vomiting. Endocrine: Negative for polydipsia, polyphagia and polyuria. Genitourinary:  Negative for dysuria, flank pain, frequency and hematuria. Musculoskeletal:  Negative for joint swelling, myalgias and neck pain. Skin:  Negative for pallor and rash. Neurological:  Negative for dizziness, syncope, speech difficulty, light-headedness, numbness and headaches. Psychiatric/Behavioral:  Negative for confusion, hallucinations and sleep disturbance. Past Medical History:      Diagnosis Date    Hypertension        Past Surgical History:      Procedure Laterality Date    CATARACT REMOVAL WITH IMPLANT Bilateral 12/2021       Medications:  Current Outpatient Medications   Medication Sig Dispense Refill    ondansetron (ZOFRAN) 4 MG tablet       metFORMIN (GLUCOPHAGE) 500 MG tablet Take 2 tablets by mouth 2 times daily      Accu-Chek Softclix Lancets MISC       ACCU-CHEK MAUREEN PLUS strip       Blood Glucose Calibration (ACCU-CHEK MAUREEN) SOLN       Alcohol Swabs (DROPSAFE ALCOHOL PREP) 70 % PADS       amLODIPine (NORVASC) 5 MG tablet Take 1 tablet by mouth 2 times daily (Patient taking differently: Take 5 mg by mouth daily) 180 tablet 3    aspirin 81 MG EC tablet Take 81 mg by mouth daily      glimepiride (AMARYL) 2 MG tablet Take 2 mg by mouth 2 times daily       omeprazole (PRILOSEC) 20 MG delayed release capsule Take 20 mg by mouth daily      olmesartan (BENICAR) 40 MG tablet Take 40 mg by mouth daily      simvastatin (ZOCOR) 20 MG tablet Take 20 mg by mouth nightly       No current facility-administered medications for this visit.        Allergies:  Jardiance [empagliflozin] and Metoprolol    Social History:  Social History     Occupational History    Not on file   Tobacco Use    Smoking status: Former     Packs/day: 2.00     Years: 50.00     Pack years: 100.00 Types: Cigarettes     Quit date: 2021     Years since quittin.5    Smokeless tobacco: Never   Vaping Use    Vaping Use: Never used   Substance and Sexual Activity    Alcohol use: Yes     Comment: rarely    Drug use: Never    Sexual activity: Yes     Partners: Female         Family History:       Problem Relation Age of Onset    Stroke Mother     Heart Attack Maternal Grandfather          Physical Examination:  /76   Pulse 50   Ht 5' 8\" (1.727 m)   Wt 199 lb (90.3 kg)   SpO2 98%   BMI 30.26 kg/m²   Physical Exam  Vitals reviewed. Constitutional:       General: He is not in acute distress. Appearance: Normal appearance. He is not ill-appearing, toxic-appearing or diaphoretic. HENT:      Head: Normocephalic and atraumatic. Eyes:      General: No scleral icterus. Right eye: No discharge. Left eye: No discharge. Conjunctiva/sclera: Conjunctivae normal.   Neck:      Vascular: No carotid bruit. Cardiovascular:      Rate and Rhythm: Normal rate and regular rhythm. No extrasystoles are present. Chest Wall: PMI is not displaced. No thrill. Heart sounds: S1 normal and S2 normal. No murmur heard. No friction rub. No gallop. Pulmonary:      Effort: Pulmonary effort is normal. No tachypnea or respiratory distress. Breath sounds: Normal breath sounds. No stridor. No wheezing, rhonchi or rales. Chest:      Chest wall: No tenderness. Abdominal:      General: Bowel sounds are normal. There is no distension. Palpations: Abdomen is soft. There is no mass. Tenderness: There is no abdominal tenderness. There is no guarding. Musculoskeletal:         General: No swelling. Cervical back: Normal range of motion and neck supple. No rigidity. Right lower leg: No edema. Left lower leg: No edema. Skin:     General: Skin is warm and dry. Coloration: Skin is not jaundiced. Findings: No erythema or rash.    Neurological:      General: No focal deficit present. Mental Status: He is alert and oriented to person, place, and time. Mental status is at baseline. Psychiatric:         Mood and Affect: Mood normal.         Behavior: Behavior normal.         Thought Content: Thought content normal.         Labs:   CBC: No results found for: CBC   BMP: No results found for: BMP    BNP: No results found for: BNPINT   PT/INR: No results found for: PROTIME, INR, INR    APTT:  No results found for: APTT   CARDIAC ENZYMES: No results found for: CKTOTAL, CKMB, CKMBINDEX, TROPONINI   LIPID PANEL: No results found for: LIPIDPAN  LIVER PROFILE: No results found for: AST, ALT, LABALBU           Imaging:    - EKG :  Sinus bradycardia 48 bpm      ASSESSMENT and PLAN:      Chronic asymptomatic bradycardia, sinus  Benign essential hypertension, goal blood pressure less than 130/80  Dyslipidemia, goal LDL less than 100  Obesity, BMI 30.26  Last nuclear medicine stress test 7/2021: Ejection fraction 65%, no ischemia or infarct. Medications reviewed. Continue conservative treatment plan. Obstructive sleep apnea  Reiterated importance of following up with sleep medicine specialist concerning fatigue, daytime somnolence and brain fog            Electronically signed by Romana Robinson, MD on 12/12/2022 at 11:04 AM    Romana Robinson, MD, SERGEY, [facility]  Noninvasive Cardiology Consultant    This dictation was generated by voice recognition computer software. Although all attempts are made to edit the dictation for accuracy, there may be errors in the transcription that are not intended.

## 2023-06-01 ENCOUNTER — OFFICE VISIT (OUTPATIENT)
Dept: NEUROLOGY | Age: 68
End: 2023-06-01
Payer: MEDICARE

## 2023-06-01 VITALS
WEIGHT: 207 LBS | BODY MASS INDEX: 31.37 KG/M2 | HEIGHT: 68 IN | SYSTOLIC BLOOD PRESSURE: 145 MMHG | HEART RATE: 67 BPM | DIASTOLIC BLOOD PRESSURE: 76 MMHG

## 2023-06-01 DIAGNOSIS — Z86.39 HISTORY OF HYPERLIPIDEMIA: ICD-10-CM

## 2023-06-01 DIAGNOSIS — Z86.39 HISTORY OF DIABETES MELLITUS: ICD-10-CM

## 2023-06-01 DIAGNOSIS — R41.3 MEMORY LOSS: Primary | ICD-10-CM

## 2023-06-01 PROCEDURE — G8427 DOCREV CUR MEDS BY ELIG CLIN: HCPCS | Performed by: PSYCHIATRY & NEUROLOGY

## 2023-06-01 PROCEDURE — 1036F TOBACCO NON-USER: CPT | Performed by: PSYCHIATRY & NEUROLOGY

## 2023-06-01 PROCEDURE — 3017F COLORECTAL CA SCREEN DOC REV: CPT | Performed by: PSYCHIATRY & NEUROLOGY

## 2023-06-01 PROCEDURE — 99214 OFFICE O/P EST MOD 30 MIN: CPT | Performed by: PSYCHIATRY & NEUROLOGY

## 2023-06-01 PROCEDURE — G8417 CALC BMI ABV UP PARAM F/U: HCPCS | Performed by: PSYCHIATRY & NEUROLOGY

## 2023-06-01 PROCEDURE — 1123F ACP DISCUSS/DSCN MKR DOCD: CPT | Performed by: PSYCHIATRY & NEUROLOGY

## 2023-06-01 RX ORDER — CEPHALEXIN 500 MG/1
CAPSULE ORAL
COMMUNITY
Start: 2023-05-30

## 2023-06-01 RX ORDER — DONEPEZIL HYDROCHLORIDE 5 MG/1
TABLET, FILM COATED ORAL
Qty: 30 TABLET | Refills: 2 | Status: SHIPPED | OUTPATIENT
Start: 2023-06-01

## 2023-06-01 NOTE — PROGRESS NOTES
Chief Complaint   Patient presents with    Follow-up    Memory Loss       Catherine Valentin is a 69y.o. year old male who is seen for evaluation of his poor short-term memory. He is here today with his wife. He complains of forgetfulness for years but over the past year or more it has been progressively worse. He has difficulty remembering why he walked into the next room. He forgets weekly meetings that he should remember. He has had at least once where he could not remember where he was while he was out driving. His father and grandmother appeared to have dementia. Patient denies any focal neurological difficulties. He does have diabetes, hypertension and hyperlipidemia. Denies any change in his memory with any of his medications. .  Recent computerized neuropsychological testing came back within normal limits. MRI the brain showing minimal small vessel ischemic change. TSH and B12 normal.  Last seen here 12/22. 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 No medications were prescribed. He is doing about the same.   Active Ambulatory Problems     Diagnosis Date Noted    Bradycardia 10/15/2019    Cigarette nicotine dependence without complication 51/55/0844    Hemoptysis 10/15/2019    DEEDEE (obstructive sleep apnea) 10/15/2019    Overweight 10/15/2019    Restrictive lung disease 10/15/2019     Resolved Ambulatory Problems     Diagnosis Date Noted    No Resolved Ambulatory Problems     Past Medical History:   Diagnosis Date    Hypertension        Past Surgical History:   Procedure Laterality Date    CATARACT REMOVAL WITH IMPLANT Bilateral 12/2021       Family History   Problem Relation Age of Onset    Stroke Mother     Heart Attack Maternal Grandfather        Allergies   Allergen Reactions    Jardiance [Empagliflozin] Rash    Metoprolol Swelling       Social History     Socioeconomic History    Marital status:      Spouse name: Not on file    Number of children: Not on file

## 2023-06-02 ENCOUNTER — TELEPHONE (OUTPATIENT)
Dept: NEUROLOGY | Age: 68
End: 2023-06-02

## 2023-06-02 RX ORDER — MEMANTINE HYDROCHLORIDE 5 MG/1
TABLET ORAL
Qty: 120 TABLET | Refills: 0 | Status: SHIPPED | OUTPATIENT
Start: 2023-06-02

## 2023-06-02 NOTE — TELEPHONE ENCOUNTER
I called pt's wife, Ulises Irving back and informed her that Dr. Berenice Diaz said for pt to not take Aricept at this time due to possible chances of making the HR worse and to take the [address] he is sending to the pharmacy for him. She states understanding.

## 2023-06-02 NOTE — TELEPHONE ENCOUNTER
Pt's wife called and states pt was just seen in office by Dr. Hailey Roblero yesterday 6/1/23 and was started on Aricept. Wife states she looked up information about this medication and she seen where it can slow your heart rate. Wife states pt already has a low heart rate that averages between 45-53 and is asking if it is safe for the pt to continue with the medication. Please advise.

## 2023-09-06 ENCOUNTER — OFFICE VISIT (OUTPATIENT)
Dept: NEUROLOGY | Age: 68
End: 2023-09-06
Payer: MEDICARE

## 2023-09-06 VITALS
DIASTOLIC BLOOD PRESSURE: 70 MMHG | HEIGHT: 68 IN | HEART RATE: 60 BPM | WEIGHT: 207 LBS | BODY MASS INDEX: 31.37 KG/M2 | SYSTOLIC BLOOD PRESSURE: 144 MMHG

## 2023-09-06 DIAGNOSIS — R41.3 MEMORY LOSS: Primary | ICD-10-CM

## 2023-09-06 DIAGNOSIS — Z86.39 HISTORY OF HYPERLIPIDEMIA: ICD-10-CM

## 2023-09-06 DIAGNOSIS — Z86.39 HISTORY OF DIABETES MELLITUS: ICD-10-CM

## 2023-09-06 PROCEDURE — G8427 DOCREV CUR MEDS BY ELIG CLIN: HCPCS | Performed by: PSYCHIATRY & NEUROLOGY

## 2023-09-06 PROCEDURE — 1036F TOBACCO NON-USER: CPT | Performed by: PSYCHIATRY & NEUROLOGY

## 2023-09-06 PROCEDURE — 3017F COLORECTAL CA SCREEN DOC REV: CPT | Performed by: PSYCHIATRY & NEUROLOGY

## 2023-09-06 PROCEDURE — G8417 CALC BMI ABV UP PARAM F/U: HCPCS | Performed by: PSYCHIATRY & NEUROLOGY

## 2023-09-06 PROCEDURE — 1123F ACP DISCUSS/DSCN MKR DOCD: CPT | Performed by: PSYCHIATRY & NEUROLOGY

## 2023-09-06 PROCEDURE — 99213 OFFICE O/P EST LOW 20 MIN: CPT | Performed by: PSYCHIATRY & NEUROLOGY

## 2023-09-06 RX ORDER — MEMANTINE HYDROCHLORIDE 5 MG/1
TABLET ORAL
Qty: 120 TABLET | Refills: 0 | Status: SHIPPED | OUTPATIENT
Start: 2023-09-06

## 2023-09-06 NOTE — PROGRESS NOTES
REVIEW OF SYSTEMS    Constitutional: []Fever []Sweat []Chills [] Recent Injury [x] Denies all unless marked  HEENT:[]Headache  [] Head Injury/Hearing Loss  [] Sore Throat  [] Ear Ache/Dizziness  [x] Denies all unless marked  Spine:  [] Neck pain  [] Back pain  [] Sciaticia  [x] Denies all unless marked  Cardiovascular:[]Heart Disease []Chest Pain [] Palpitations  [x] Denies all unless marked  Pulmonary: []Shortness of Breath []Cough   [x] Denies all unless marke  Gastrointestinal: []Nausea  []Vomiting  []Abdominal Pain  []Constipation  []Diarrhea  []Dark Bloody Stools  [x] Denies all unless marked  Psychiatric/Behavioral:[] Depression [] Anxiety [x] Denies all unless marked  Genitourinary:   [] Frequency  [] Urgency  [] Incontinence [] Pain with Urination  [x] Denies all unless marked  Extremities: []Pain  []Swelling  [x] Denies all unless marked  Musculoskeletal: [] Muscle Pain  [] Joint Pain  [] Arthritis [] Muscle Cramps [] Muscle Twitches  [x] Denies all unless marked  Sleep: [] Insomnia [] Snoring [] Restless Legs [] Sleep Apnea  [] Daytime Sleepiness  [x] Denies all unless marked  Skin:[] Rash [] Skin Discoloration [x] Denies all unless marked   Neurological: [x]Visual Disturbance/Memory Loss [] Loss of Balance [] Slurred Speech/Weakness [] Seizures  [] Vertigo/Dizziness [x] Denies all unless marked
[x] Denies all unless marked  Genitourinary:   [] Frequency  [] Urgency  [] Incontinence [] Pain with Urination  [x] Denies all unless marked  Extremities: []Pain  []Swelling  [x] Denies all unless marked  Musculoskeletal: [] Muscle Pain  [] Joint Pain  [] Arthritis [] Muscle Cramps [] Muscle Twitches  [x] Denies all unless marked  Sleep: [] Insomnia [] Snoring [] Restless Legs [] Sleep Apnea  [] Daytime Sleepiness  [x] Denies all unless marked  Skin:[] Rash [] Skin Discoloration [x] Denies all unless marked   Neurological: [x]Visual Disturbance/Memory Loss [] Loss of Balance [] Slurred Speech/Weakness [] Seizures  [] Vertigo/Dizziness [x] Denies all unless marked            Current Outpatient Medications   Medication Sig Dispense Refill    memantine (NAMENDA) 5 MG tablet 1 daily for a week, then twice daily for a week, then 1 am and 2 pm for a week, then 2 am and pm 120 tablet 0    cephALEXin (KEFLEX) 500 MG capsule       mupirocin (BACTROBAN) 2 % ointment       ondansetron (ZOFRAN) 4 MG tablet       metFORMIN (GLUCOPHAGE) 500 MG tablet Take 2 tablets by mouth 2 times daily      Accu-Chek Softclix Lancets MISC       ACCU-CHEK MAUREEN PLUS strip       Blood Glucose Calibration (ACCU-CHEK MAUREEN) SOLN       Alcohol Swabs (DROPSAFE ALCOHOL PREP) 70 % PADS       amLODIPine (NORVASC) 5 MG tablet Take 1 tablet by mouth 2 times daily (Patient taking differently: Take 1 tablet by mouth daily) 180 tablet 3    aspirin 81 MG EC tablet Take 1 tablet by mouth daily      glimepiride (AMARYL) 2 MG tablet Take 1 tablet by mouth 2 times daily      omeprazole (PRILOSEC) 20 MG delayed release capsule Take 1 capsule by mouth daily      olmesartan (BENICAR) 40 MG tablet Take 1 tablet by mouth daily      simvastatin (ZOCOR) 20 MG tablet Take 1 tablet by mouth nightly       No current facility-administered medications for this visit.        Outpatient Medications Marked as Taking for the 9/6/23 encounter (Office Visit) with Aureliano Greer MD

## 2023-10-30 ENCOUNTER — OFFICE VISIT (OUTPATIENT)
Dept: ENT CLINIC | Age: 68
End: 2023-10-30
Payer: MEDICARE

## 2023-10-30 VITALS
BODY MASS INDEX: 31.07 KG/M2 | HEIGHT: 68 IN | SYSTOLIC BLOOD PRESSURE: 132 MMHG | DIASTOLIC BLOOD PRESSURE: 74 MMHG | WEIGHT: 205 LBS

## 2023-10-30 DIAGNOSIS — H61.21 IMPACTED CERUMEN OF RIGHT EAR: ICD-10-CM

## 2023-10-30 DIAGNOSIS — R49.0 HOARSENESS OF VOICE: Primary | ICD-10-CM

## 2023-10-30 PROCEDURE — 99203 OFFICE O/P NEW LOW 30 MIN: CPT | Performed by: PHYSICIAN ASSISTANT

## 2023-10-30 PROCEDURE — G8417 CALC BMI ABV UP PARAM F/U: HCPCS | Performed by: PHYSICIAN ASSISTANT

## 2023-10-30 PROCEDURE — 31575 DIAGNOSTIC LARYNGOSCOPY: CPT | Performed by: PHYSICIAN ASSISTANT

## 2023-10-30 PROCEDURE — G8484 FLU IMMUNIZE NO ADMIN: HCPCS | Performed by: PHYSICIAN ASSISTANT

## 2023-10-30 PROCEDURE — 3017F COLORECTAL CA SCREEN DOC REV: CPT | Performed by: PHYSICIAN ASSISTANT

## 2023-10-30 PROCEDURE — 69210 REMOVE IMPACTED EAR WAX UNI: CPT | Performed by: PHYSICIAN ASSISTANT

## 2023-10-30 PROCEDURE — 1123F ACP DISCUSS/DSCN MKR DOCD: CPT | Performed by: PHYSICIAN ASSISTANT

## 2023-10-30 PROCEDURE — 1036F TOBACCO NON-USER: CPT | Performed by: PHYSICIAN ASSISTANT

## 2023-10-30 PROCEDURE — G8427 DOCREV CUR MEDS BY ELIG CLIN: HCPCS | Performed by: PHYSICIAN ASSISTANT

## 2023-10-30 RX ORDER — DONEPEZIL HYDROCHLORIDE 5 MG/1
TABLET, FILM COATED ORAL
COMMUNITY
Start: 2023-09-21

## 2023-10-30 RX ORDER — PANTOPRAZOLE SODIUM 20 MG/1
20 TABLET, DELAYED RELEASE ORAL 2 TIMES DAILY
Qty: 60 TABLET | Refills: 2 | Status: SHIPPED | OUTPATIENT
Start: 2023-10-30

## 2023-10-30 ASSESSMENT — ENCOUNTER SYMPTOMS
SINUS PAIN: 0
VOICE CHANGE: 0
PHOTOPHOBIA: 0
SORE THROAT: 0
TROUBLE SWALLOWING: 0
EYE PAIN: 0
RHINORRHEA: 0
SINUS PRESSURE: 0
FACIAL SWELLING: 0

## 2023-10-30 NOTE — PROGRESS NOTES
[facility] OTOLARYNGOLOGY/ENT  Mr. Asim [facility] is a pleasant 68-year-old  male that was referred by Michelle Dempsey due to vocal hoarseness. Patient reports that this was first noted 2 to 3 months ago. He reports this has waxed and waned. He does admit to having more frequent reflux and has been taking 2-3 omeprazole as per day. He reports he has been on omeprazole for years. He currently denies any dysphagia to include solid or liquids. His wife was recently diagnosed with vulvar cancer secondary to HPV. Due to the hoarseness they are requesting further evaluation. Allergies: Jardiance [empagliflozin] and Metoprolol      Current Outpatient Medications   Medication Sig Dispense Refill    donepezil (ARICEPT) 5 MG tablet       pantoprazole (PROTONIX) 20 MG tablet Take 1 tablet by mouth in the morning and 1 tablet in the evening.  60 tablet 2    metFORMIN (GLUCOPHAGE) 500 MG tablet Take 2 tablets by mouth 2 times daily      Accu-Chek Softclix Lancets MISC       ACCU-CHEK MAUREEN PLUS strip       Blood Glucose Calibration (ACCU-CHEK MAUREEN) SOLN       Alcohol Swabs (DROPSAFE ALCOHOL PREP) 70 % PADS       amLODIPine (NORVASC) 5 MG tablet Take 1 tablet by mouth 2 times daily (Patient taking differently: Take 1 tablet by mouth daily) 180 tablet 3    aspirin 81 MG EC tablet Take 1 tablet by mouth daily      glimepiride (AMARYL) 2 MG tablet Take 1 tablet by mouth 2 times daily      omeprazole (PRILOSEC) 20 MG delayed release capsule Take 1 capsule by mouth daily      olmesartan (BENICAR) 40 MG tablet Take 1 tablet by mouth daily      simvastatin (ZOCOR) 20 MG tablet Take 1 tablet by mouth nightly      memantine (NAMENDA) 5 MG tablet 1 daily for a week, then twice daily for a week, then 1 am and 2 pm for a week, then 2 am and pm (Patient not taking: Reported on 10/30/2023) 120 tablet 0    mupirocin (BACTROBAN) 2 % ointment  (Patient not taking: Reported on 10/30/2023)      ondansetron (ZOFRAN) 4 MG tablet  (Patient not

## 2023-10-30 NOTE — ASSESSMENT & PLAN NOTE
Hoarseness of voice secondary to LPR based on today's laryngoscopy  Plan: I recommended D/C'ing his omeprazole and placing him on Protonix 20 mg twice a day. He is to follow-up in 6 weeks for reevaluation. He is currently scheduled for colonoscopy and endoscopy in December.

## 2023-11-30 ENCOUNTER — OFFICE VISIT (OUTPATIENT)
Dept: GASTROENTEROLOGY | Facility: CLINIC | Age: 68
End: 2023-11-30
Payer: MEDICARE

## 2023-11-30 VITALS
WEIGHT: 205.4 LBS | DIASTOLIC BLOOD PRESSURE: 80 MMHG | TEMPERATURE: 97.8 F | HEIGHT: 68 IN | OXYGEN SATURATION: 95 % | BODY MASS INDEX: 31.13 KG/M2 | HEART RATE: 51 BPM | SYSTOLIC BLOOD PRESSURE: 138 MMHG

## 2023-11-30 DIAGNOSIS — K21.9 GASTROESOPHAGEAL REFLUX DISEASE, UNSPECIFIED WHETHER ESOPHAGITIS PRESENT: Primary | ICD-10-CM

## 2023-11-30 DIAGNOSIS — E11.9 CONTROLLED TYPE 2 DIABETES MELLITUS WITHOUT COMPLICATION, WITHOUT LONG-TERM CURRENT USE OF INSULIN: ICD-10-CM

## 2023-11-30 DIAGNOSIS — Z86.010 HX OF ADENOMATOUS COLONIC POLYPS: ICD-10-CM

## 2023-11-30 DIAGNOSIS — Z78.9 NONSMOKER: ICD-10-CM

## 2023-11-30 DIAGNOSIS — I10 HTN (HYPERTENSION), BENIGN: ICD-10-CM

## 2023-11-30 RX ORDER — TIRZEPATIDE 2.5 MG/.5ML
2.5 INJECTION, SOLUTION SUBCUTANEOUS WEEKLY
COMMUNITY
Start: 2023-11-20

## 2023-11-30 RX ORDER — AMLODIPINE BESYLATE 5 MG/1
5 TABLET ORAL DAILY
COMMUNITY

## 2023-11-30 RX ORDER — GLIMEPIRIDE 4 MG/1
4 TABLET ORAL
COMMUNITY
Start: 2023-09-20

## 2023-11-30 RX ORDER — ASPIRIN 81 MG/1
1 TABLET ORAL DAILY
COMMUNITY

## 2023-11-30 RX ORDER — PANTOPRAZOLE SODIUM 40 MG/1
40 TABLET, DELAYED RELEASE ORAL DAILY
Qty: 30 TABLET | Refills: 11 | Status: SHIPPED | OUTPATIENT
Start: 2023-11-30

## 2023-11-30 RX ORDER — PANTOPRAZOLE SODIUM 20 MG/1
20 TABLET, DELAYED RELEASE ORAL
COMMUNITY
Start: 2023-10-30 | End: 2023-11-30

## 2023-11-30 NOTE — PROGRESS NOTES
Ghulam Artis  1955      11/30/2023  Chief Complaint   Patient presents with    GI Problem     reflux    Colonoscopy     Colon recall     Subjective   HPI  Ghulam Artis is a 68 y.o. male who presents as a referral for preventative maintenance. He has no complaints of nausea or vomiting. No change in bowels. No wt loss. No BRBPR. No melena. There is NO family hx for colon cancer. No abdominal pain.  GERD  Ongoing persistent for 15+ years. He is on Protonix this worked well for him until a few weeks ago. He has had more burning he is taking two per day. No nausea or vomiting. No dysphagia. He did well on the Prilosec.  He has been taking Rolaids to supplement.     Past Medical History:   Diagnosis Date    GERD (gastroesophageal reflux disease)     Hyperlipidemia     Hypertension     Sleep apnea     cpap at hs     Past Surgical History:   Procedure Laterality Date    CATARACT EXTRACTION Bilateral     COLONOSCOPY N/A 09/24/2018    6 mm hyperplastic polyp was found at 20 cm proximal to the anus, tics sigmoid colon    COLONOSCOPY W/ POLYPECTOMY  05/13/2013    TUBULAR ADENOMA POLYP AT 60 CM, HYPERPLASTIC POLYP AT 30 CM, DIVERTICULOSIS SIGMOID COLON, RECALL 5YRS    COLONOSCOPY W/ POLYPECTOMY  02/26/2008    HYPERPLASTIC POLYPS RECTUM, SCATTERED DIVERTICULA     Outpatient Medications Marked as Taking for the 11/30/23 encounter (Office Visit) with Maria D Ludwig APRN   Medication Sig Dispense Refill    amLODIPine (NORVASC) 5 MG tablet Take 1 tablet by mouth Daily.      aspirin 81 MG EC tablet Take 1 tablet by mouth Daily.      Azelastine HCl 137 MCG/SPRAY solution       fluticasone (FLONASE) 50 MCG/ACT nasal spray       glimepiride (AMARYL) 4 MG tablet Take 1 tablet by mouth Every Morning Before Breakfast.      HYDROcodone-acetaminophen (NORCO) 5-325 MG per tablet Take 1 tablet by mouth Every 6 (Six) Hours As Needed.      Mounjaro 2.5 MG/0.5ML solution pen-injector Inject 2.5 mL under the skin into the  appropriate area as directed 1 (One) Time Per Week.      olmesartan (BENICAR) 20 MG tablet Take 1 tablet by mouth Daily.      simvastatin (ZOCOR) 20 MG tablet Take 1 tablet by mouth Every Night.      [DISCONTINUED] CHANTIX 1 MG tablet       [DISCONTINUED] omeprazole (priLOSEC) 40 MG capsule Take 1 capsule by mouth Daily.      [DISCONTINUED] pantoprazole (PROTONIX) 20 MG EC tablet Take 1 tablet by mouth.       No Known Allergies  Social History     Socioeconomic History    Marital status:    Tobacco Use    Smoking status: Never    Smokeless tobacco: Never   Substance and Sexual Activity    Alcohol use: Yes     Comment: Occasional    Drug use: No    Sexual activity: Defer     Family History   Problem Relation Age of Onset    Heart disease Maternal Grandfather     Heart attack Maternal Grandfather     Colon cancer Neg Hx     GI problems Neg Hx     Colon polyps Neg Hx      Health Maintenance   Topic Date Due    URINE MICROALBUMIN  Never done    TDAP/TD VACCINES (1 - Tdap) Never done    HEPATITIS C SCREENING  Never done    ANNUAL PHYSICAL  Never done    DIABETIC FOOT EXAM  Never done    Pneumococcal Vaccine 65+ (2 - PCV) 10/01/2020    LUNG CANCER SCREENING  10/22/2020    BMI FOLLOWUP  10/24/2020    INFLUENZA VACCINE  08/01/2023    COVID-19 Vaccine (6 - 2023-24 season) 09/01/2023    COLORECTAL CANCER SCREENING  09/24/2023    HEMOGLOBIN A1C  03/08/2024    DIABETIC EYE EXAM  08/10/2024    LIPID PANEL  09/08/2024    ZOSTER VACCINE  Completed       REVIEW OF SYSTEMS  General: well appearing, no fever chills or sweats, no unexplained wt loss  HEENT: no acute visual or hearing disturbances  Cardiovascular: No chest pain or palpitations  Pulmonary: No shortness of breath, coughing, wheezing or hemoptysis  : No burning, urgency, hematuria, or dysuria  GI: reflux  Musculoskeletal: No joint pain or stiffness  Peripheral: no edema  Skin: No lesions or rashes  Neuro: No dizziness, headaches, stroke, syncope  Endocrine: No  "hot or cold intolerances  Hematological: No blood dyscrasias    Objective   Vitals:    11/30/23 1029   BP: 138/80   BP Location: Right arm   Pulse: 51   Temp: 97.8 °F (36.6 °C)   TempSrc: Temporal   SpO2: 95%   Weight: 93.2 kg (205 lb 6.4 oz)   Height: 172.7 cm (67.99\")     Body mass index is 31.24 kg/m².  BMI is >= 30 and <35. (Class 1 Obesity). The following options were offered after discussion;: weight loss educational material (shared in after visit summary)      PHYSICAL EXAM  General: age appropriate well nourished well appearing, no acute distress  Head: normocephalic and atraumatic  Global assessment-supple  Neck-No JVD noted, no lymphadenopathy  Pulmonary-clear to auscultation bilaterally, normal respiratory effort  Cardiovascular-normal rate and rhythm, normal heart sounds, S1 and S2 noted  Abdomen-soft, non tender, non distended, normal bowel sounds all 4 quadrants, no hepatosplenomegaly noted  Extremities-No clubbing cyanosis or edema  Neuro-Non focal, converses appropriately, awake, alert, oriented    Assessment & Plan     Diagnoses and all orders for this visit:    1. Gastroesophageal reflux disease, unspecified whether esophagitis present (Primary)    2. Hx of adenomatous colonic polyps  -     Case Request; Standing  -     Case Request  -     polyethylene glycol (GoLYTELY) 236 g solution; Take as directed by office instructions.  Dispense: 4000 mL; Refill: 0    3. Nonsmoker    4. HTN (hypertension), benign  Comments:  cont BP medication the day of procedure    5. Controlled type 2 diabetes mellitus without complication, without long-term current use of insulin  Comments:  Hold DM medication the day of procedure    Other orders  -     pantoprazole (PROTONIX) 40 MG EC tablet; Take 1 tablet by mouth Daily.  Dispense: 30 tablet; Refill: 11  -     Implement Anesthesia Orders Day of Procedure; Standing  -     Obtain Informed Consent; Standing  -     Follow Anesthesia Guidelines / Protocol; Future  -     " Obtain Informed Consent; Future  -     Verify Bowel Prep Was Successful; Standing    I discussed non pharmaceutical treatment of gerd.  This includes gradually losing weight to achieve ideal body wt., elevation of the head of bed by 4-6 inches, nothing to eat or drink 3 hours prior to lying down, avoiding tight clothing, stress reduction, tobacco cessation, reduction of alcohol intake, and dietary restrictions (avoiding caffeine, coffee, fatty foods, mints, chocolate, spicy foods and tomato based sauces as much as possible).      ESOPHAGOGASTRODUODENOSCOPY WITH ANESTHESIA (N/A), COLONOSCOPY WITH ANESTHESIA (N/A)  Body mass index is 31.24 kg/m².    Patient instructions on prep prior to procedure provided to the patient.    All risks, benefits, alternatives, and indications of colonoscopy procedure have been discussed with the patient. Risks to include perforation of the colon requiring possible surgery or colostomy, risk of bleeding from biopsies or removal of colon tissue, possibility of missing a colon polyp or cancer, or adverse drug reaction.  Benefits to include the diagnosis and management of disease of the colon and rectum. Alternatives to include barium enema, radiographic evaluation, lab testing or no intervention. Pt verbalizes understanding and agrees.     Maria D Ludwig, LUDIVINA  2023  11:09 CST      IF YOU SMOKE OR USE TOBACCO PLEASE READ THE FOLLOWIN minutes reading provided    Why is smoking bad for me?  Smoking increases the risk of heart disease, lung disease, vascular disease, stroke, and cancer.     If you smoke, STOP!    If you would like more information on quitting smoking, please visit the [email or web address] website: [email or web address]   This link will provide additional resources including the QUIT line and the Beat the Pack support groups.     For more information:    Quit Now  [place]  1-800-QUIT-NOW  https://[place].quitlogix.org/en-[place]/

## 2023-12-11 ENCOUNTER — OFFICE VISIT (OUTPATIENT)
Dept: ENT CLINIC | Age: 68
End: 2023-12-11
Payer: MEDICARE

## 2023-12-11 VITALS
SYSTOLIC BLOOD PRESSURE: 138 MMHG | HEIGHT: 68 IN | DIASTOLIC BLOOD PRESSURE: 76 MMHG | BODY MASS INDEX: 30.92 KG/M2 | WEIGHT: 204 LBS

## 2023-12-11 DIAGNOSIS — K21.9 LARYNGOPHARYNGEAL REFLUX (LPR): Primary | ICD-10-CM

## 2023-12-11 PROCEDURE — G8427 DOCREV CUR MEDS BY ELIG CLIN: HCPCS | Performed by: PHYSICIAN ASSISTANT

## 2023-12-11 PROCEDURE — 1123F ACP DISCUSS/DSCN MKR DOCD: CPT | Performed by: PHYSICIAN ASSISTANT

## 2023-12-11 PROCEDURE — 99213 OFFICE O/P EST LOW 20 MIN: CPT | Performed by: PHYSICIAN ASSISTANT

## 2023-12-11 PROCEDURE — G8417 CALC BMI ABV UP PARAM F/U: HCPCS | Performed by: PHYSICIAN ASSISTANT

## 2023-12-11 PROCEDURE — G8484 FLU IMMUNIZE NO ADMIN: HCPCS | Performed by: PHYSICIAN ASSISTANT

## 2023-12-11 PROCEDURE — 1036F TOBACCO NON-USER: CPT | Performed by: PHYSICIAN ASSISTANT

## 2023-12-11 PROCEDURE — 3017F COLORECTAL CA SCREEN DOC REV: CPT | Performed by: PHYSICIAN ASSISTANT

## 2023-12-11 RX ORDER — PANTOPRAZOLE SODIUM 40 MG/1
40 TABLET, DELAYED RELEASE ORAL
Qty: 60 TABLET | Refills: 2 | Status: SHIPPED | OUTPATIENT
Start: 2023-12-11

## 2023-12-11 RX ORDER — SUCRALFATE 1 G/1
1 TABLET ORAL 3 TIMES DAILY
Qty: 90 TABLET | Refills: 1 | Status: SHIPPED | OUTPATIENT
Start: 2023-12-11

## 2023-12-11 ASSESSMENT — ENCOUNTER SYMPTOMS
VOICE CHANGE: 0
TROUBLE SWALLOWING: 0
SINUS PAIN: 0
SINUS PRESSURE: 0
PHOTOPHOBIA: 0
RHINORRHEA: 0
EYE PAIN: 0
FACIAL SWELLING: 0
SORE THROAT: 0

## 2023-12-11 NOTE — ASSESSMENT & PLAN NOTE
LPR with dysphagia suspicious for a possible esophageal stricture  Plan: I recommended increasing the patient's Protonix to 40 mg twice a day until he has his endoscopy in January. Will also add Carafate 3 times a day. He is to follow-up in 6 weeks for reevaluation after EGD. Patient was reminded to call if he has any questions or concerns.

## 2023-12-11 NOTE — PROGRESS NOTES
[facility] OTOLARYNGOLOGY/ENT  Mr. Ashanti Mckeon is a pleasant 66-year-old  male that presents for a 6-week follow-up for LPR. Patient reports that he is sporadically getting choked on solid foods and feels like it is going down the wrong way at times. He reports this occurs about once or twice a week. He is currently scheduled for upper GI endoscopy for January 8. He reports that the twice daily Protonix seem to be helping but stated it was not completely alleviate his symptoms. He reports that he was taking 3 a day of Protonix 20 until our office told him that he was only prescribed the drug twice a day. He was recently seen by Beti Thornton at Dr. Fariba Salazar office and was placed on Protonix 40 mg daily and was told to discontinue the 20 mg twice daily. He continues to complain of breakthrough symptoms. He also reports a sporadic hoarseness that has been occurring.         Allergies: Jardiance [empagliflozin] and Metoprolol      Current Outpatient Medications   Medication Sig Dispense Refill    pantoprazole (PROTONIX) 40 MG tablet Take 1 tablet by mouth 2 times daily (before meals) 60 tablet 2    sucralfate (CARAFATE) 1 GM tablet Take 1 tablet by mouth 3 times daily 90 tablet 1    donepezil (ARICEPT) 5 MG tablet       metFORMIN (GLUCOPHAGE) 500 MG tablet Take 2 tablets by mouth 2 times daily      Accu-Chek Softclix Lancets MISC       ACCU-CHEK MAUREEN PLUS strip       Blood Glucose Calibration (ACCU-CHEK MAUREEN) SOLN       Alcohol Swabs (DROPSAFE ALCOHOL PREP) 70 % PADS       amLODIPine (NORVASC) 5 MG tablet Take 1 tablet by mouth 2 times daily 180 tablet 3    aspirin 81 MG EC tablet Take 1 tablet by mouth daily      glimepiride (AMARYL) 2 MG tablet Take 1 tablet by mouth 2 times daily      olmesartan (BENICAR) 40 MG tablet Take 1 tablet by mouth daily      simvastatin (ZOCOR) 20 MG tablet Take 1 tablet by mouth nightly      memantine (NAMENDA) 5 MG tablet 1 daily for a week, then twice daily for a week, then 1 am

## 2023-12-12 ENCOUNTER — OFFICE VISIT (OUTPATIENT)
Dept: CARDIOLOGY CLINIC | Age: 68
End: 2023-12-12
Payer: MEDICARE

## 2023-12-12 VITALS
HEART RATE: 51 BPM | DIASTOLIC BLOOD PRESSURE: 90 MMHG | BODY MASS INDEX: 31.07 KG/M2 | SYSTOLIC BLOOD PRESSURE: 130 MMHG | HEIGHT: 68 IN | WEIGHT: 205 LBS

## 2023-12-12 DIAGNOSIS — I10 ESSENTIAL HYPERTENSION: Primary | ICD-10-CM

## 2023-12-12 DIAGNOSIS — R00.1 BRADYCARDIA: ICD-10-CM

## 2023-12-12 PROCEDURE — G8484 FLU IMMUNIZE NO ADMIN: HCPCS | Performed by: INTERNAL MEDICINE

## 2023-12-12 PROCEDURE — G8427 DOCREV CUR MEDS BY ELIG CLIN: HCPCS | Performed by: INTERNAL MEDICINE

## 2023-12-12 PROCEDURE — G8417 CALC BMI ABV UP PARAM F/U: HCPCS | Performed by: INTERNAL MEDICINE

## 2023-12-12 PROCEDURE — 3075F SYST BP GE 130 - 139MM HG: CPT | Performed by: INTERNAL MEDICINE

## 2023-12-12 PROCEDURE — 3017F COLORECTAL CA SCREEN DOC REV: CPT | Performed by: INTERNAL MEDICINE

## 2023-12-12 PROCEDURE — 1036F TOBACCO NON-USER: CPT | Performed by: INTERNAL MEDICINE

## 2023-12-12 PROCEDURE — 99214 OFFICE O/P EST MOD 30 MIN: CPT | Performed by: INTERNAL MEDICINE

## 2023-12-12 PROCEDURE — 1123F ACP DISCUSS/DSCN MKR DOCD: CPT | Performed by: INTERNAL MEDICINE

## 2023-12-12 PROCEDURE — 3079F DIAST BP 80-89 MM HG: CPT | Performed by: INTERNAL MEDICINE

## 2023-12-12 PROCEDURE — 93000 ELECTROCARDIOGRAM COMPLETE: CPT | Performed by: INTERNAL MEDICINE

## 2023-12-12 ASSESSMENT — ENCOUNTER SYMPTOMS
FACIAL SWELLING: 0
CONSTIPATION: 0
DIARRHEA: 0
COUGH: 0
BLOOD IN STOOL: 0
ABDOMINAL DISTENTION: 0
EYE REDNESS: 0
EYE PAIN: 0
APNEA: 0
SHORTNESS OF BREATH: 0
ABDOMINAL PAIN: 0
EYE DISCHARGE: 0
CHEST TIGHTNESS: 0
SORE THROAT: 0
WHEEZING: 0
NAUSEA: 0
VOMITING: 0

## 2023-12-12 NOTE — PROGRESS NOTES
Cardiology Office Visit Note  [address] [place], [address] [address]  Phone: [phone]  Fax: [phone]                            Date:  12/12/2023  Patient: Janis Parks  Age:  77 y.o., 1955    Referral: No ref. provider found    REASON FOR VISIT:  1 Year Follow Up         PROBLEM LIST:    Patient Active Problem List    Diagnosis Date Noted    Bradycardia 10/15/2019     Priority: Medium    Cigarette nicotine dependence without complication 86/48/7089     Priority: Medium    Hemoptysis 10/15/2019     Priority: Medium    DEEDEE (obstructive sleep apnea) 10/15/2019     Priority: Medium    Overweight 10/15/2019     Priority: Medium    Restrictive lung disease 10/15/2019     Priority: Medium    Laryngopharyngeal reflux (LPR) 12/11/2023    Impacted cerumen of right ear 10/30/2023    Hoarseness of voice 10/30/2023         PRESENTATION: Jains Parks is a 77y.o. year old male is seen today for an elective 1 year cardiology follow-up appointment. Patient's past medical history notable for essential hypertension, diabetes mellitus, dyslipidemia, COPD and obstructive sleep apnea with variable compliance to CPAP at nights. Overall he has been doing well without any major medical events over the past year. More recently however he noticed very transient episode of chest pain lasting for less than 1 minute after lifting a very heavy box. Patient is currently in the middle of a move. No other symptoms at the time. No further recurrences. REVIEW OF SYSTEMS:  Review of Systems   Constitutional:  Negative for chills, fatigue and fever. HENT:  Negative for congestion, facial swelling, hearing loss and sore throat. Eyes:  Negative for pain, discharge, redness and visual disturbance. Respiratory:  Negative for apnea, cough, chest tightness, shortness of breath and wheezing. Cardiovascular:  Negative for chest pain, palpitations and leg swelling.    Gastrointestinal:  Negative for

## 2024-01-04 ENCOUNTER — NURSE TRIAGE (OUTPATIENT)
Dept: CALL CENTER | Facility: HOSPITAL | Age: 69
End: 2024-01-04
Payer: MEDICARE

## 2024-01-04 NOTE — TELEPHONE ENCOUNTER
Caller, who is wife, reports spouse having flu like symptoms x 2 days including mild cough, chest and nasal congestion, lethargy and body aches. Has not been tested for FLU or COVID and requesting recommendations per PCP. Denies fever or any respiratory distress.    Per EPIC chart - PMH of Diabetes, COPD    Office is closed at this time. Informed caller that message will be sent for call back. Verbalized understanding.    Reason for Disposition   HIGH RISK (e.g., age > 64 years, pregnant, HIV+, chronic medical condition) and flu symptoms    Additional Information   Negative: SEVERE difficulty breathing (e.g., struggling for each breath, speaks in single words)   Negative: Bluish (or gray) lips or face   Negative: Shock suspected (e.g., cold/pale/clammy skin, too weak to stand, low BP, rapid pulse)   Negative: Sounds like a life-threatening emergency to the triager   Negative: Symptoms of COVID-19 (e.g., cough, fever, SOB, or others) and lives in an area with community spread   Negative: Sounds like a cold and there is no fever   Negative: Cough and there is no fever   Negative: Severe cough   Negative: Throat pain and there is no fever   Negative: Severe sore throat   Negative: Influenza vaccine reaction is suspected   Negative: Headache and stiff neck (can't touch chin to chest)   Negative: Chest pain  (Exception: MILD central chest pain, present only when coughing.)   Negative: Difficulty breathing that is not severe and not relieved by cleaning out the nose   Negative: Patient sounds very sick or weak to the triager   Negative: Fever > 104 F (40 C)   Negative: Fever > 101 F (38.3 C) and over 60 years of age   Negative: Fever > 100.0 F (37.8 C) and diabetes mellitus or weak immune system (e.g., HIV positive, cancer chemo, splenectomy, organ transplant, chronic steroids)   Negative: Fever > 100.0 F (37.8 C) and bedridden (e.g., CVA, chronic illness, recovering from surgery)   Negative: Using nasal washes and pain  "medicine > 24 hours and sinus pain (lower forehead, cheekbone, or eye) persists   Negative: Fever present > 3 days (72 hours)   Negative: Fever returns after gone for over 24 hours and symptoms worse (or not improved)   Negative: Earache   Negative: Patient wants to be seen    Answer Assessment - Initial Assessment Questions  1. WORST SYMPTOM: \"What is your worst symptom?\" (e.g., cough, runny nose, muscle aches, headache, sore throat, fever)       Body aches, lethargy  2. ONSET: \"When did your flu symptoms start?\"       Yesterday  3. COUGH: \"How bad is the cough?\"        Mild cough with chest and nasal congestion  4. RESPIRATORY DISTRESS: \"Describe your breathing.\"       Denies  5. FEVER: \"Do you have a fever?\" If Yes, ask: \"What is your temperature, how was it measured, and when did it start?\"      Denies  6. EXPOSURE: \"Were you exposed to someone with influenza?\"        Unknown  7. FLU VACCINE: \"Did you get a flu shot this year?\"      Not asked  8. HIGH RISK DISEASE: \"Do you have any chronic medical problems?\" (e.g., heart or lung disease, asthma, weak immune system, or other HIGH RISK conditions)      Yes, Diabetes, COPD, age.  9. PREGNANCY: \"Is there any chance you are pregnant?\" \"When was your last menstrual period?\"      N/A  10. OTHER SYMPTOMS: \"Do you have any other symptoms?\"  (e.g., runny nose, muscle aches, headache, sore throat)        As above    Protocols used: Influenza (Flu) - Seasonal-ADULT-[place]    "

## 2024-01-08 ENCOUNTER — TELEPHONE (OUTPATIENT)
Dept: GASTROENTEROLOGY | Facility: HOSPITAL | Age: 69
End: 2024-01-08
Payer: MEDICARE

## 2024-01-22 ENCOUNTER — OFFICE VISIT (OUTPATIENT)
Dept: ENT CLINIC | Age: 69
End: 2024-01-22
Payer: MEDICARE

## 2024-01-22 VITALS
WEIGHT: 205 LBS | SYSTOLIC BLOOD PRESSURE: 130 MMHG | BODY MASS INDEX: 31.07 KG/M2 | DIASTOLIC BLOOD PRESSURE: 76 MMHG | HEIGHT: 68 IN

## 2024-01-22 DIAGNOSIS — K21.9 LARYNGOPHARYNGEAL REFLUX (LPR): Primary | ICD-10-CM

## 2024-01-22 DIAGNOSIS — R49.0 HOARSENESS OF VOICE: ICD-10-CM

## 2024-01-22 PROCEDURE — 1123F ACP DISCUSS/DSCN MKR DOCD: CPT | Performed by: PHYSICIAN ASSISTANT

## 2024-01-22 PROCEDURE — G8417 CALC BMI ABV UP PARAM F/U: HCPCS | Performed by: PHYSICIAN ASSISTANT

## 2024-01-22 PROCEDURE — 1036F TOBACCO NON-USER: CPT | Performed by: PHYSICIAN ASSISTANT

## 2024-01-22 PROCEDURE — G8427 DOCREV CUR MEDS BY ELIG CLIN: HCPCS | Performed by: PHYSICIAN ASSISTANT

## 2024-01-22 PROCEDURE — 99213 OFFICE O/P EST LOW 20 MIN: CPT | Performed by: PHYSICIAN ASSISTANT

## 2024-01-22 PROCEDURE — G8484 FLU IMMUNIZE NO ADMIN: HCPCS | Performed by: PHYSICIAN ASSISTANT

## 2024-01-22 PROCEDURE — 3017F COLORECTAL CA SCREEN DOC REV: CPT | Performed by: PHYSICIAN ASSISTANT

## 2024-01-22 RX ORDER — PANTOPRAZOLE SODIUM 40 MG/1
40 TABLET, DELAYED RELEASE ORAL
Qty: 60 TABLET | Refills: 1 | Status: SHIPPED | OUTPATIENT
Start: 2024-01-22

## 2024-01-22 NOTE — PROGRESS NOTES
Mr. Pate is a pleasant 68-year-old  male that presents for a 6-week follow-up after treatment for LPR with secondary dysphagia.  Patient was initially scheduled for upper GI endoscopy a couple weeks ago and had to be canceled due to the patient incurring COVID.  Overall he reports that the dysphagia seems to be improving with the medications.  He is currently taking 40 mg of Protonix once a day.  Patient does admit to some recurrent hoarseness that has been present since having COVID recently.      Physical examination revealed the patient to have normal TMs canals bilaterally.  Oral exam demonstrated no erythematous changes to the posterior pharynx with no masses.  Neck exam demonstrated no lymphadenopathy or thyromegaly.  Patient's vocals were noted to be fairly normal with no obvious hoarseness.      Impression: Clinically improved dysphagia-questionably secondary to an esophageal stricture based on his recent history    Plan: Patient is currently scheduled for upper GI endoscopy for February 21.  I recommended the patient to continue his PPI therapy until the EGD.  I would like to see him back after the EGD for reevaluation to ensure his symptoms have returned back to normal.  Patient was reminded to call if he has any questions or concerns.      Electronically signed by ROMAN AHUMADA PA-C on 1/22/24 at 11:32 AM CST

## 2024-02-02 ENCOUNTER — OFFICE VISIT (OUTPATIENT)
Dept: NEUROLOGY | Age: 69
End: 2024-02-02
Payer: MEDICARE

## 2024-02-02 VITALS
BODY MASS INDEX: 29.1 KG/M2 | DIASTOLIC BLOOD PRESSURE: 76 MMHG | HEART RATE: 56 BPM | SYSTOLIC BLOOD PRESSURE: 133 MMHG | HEIGHT: 68 IN | WEIGHT: 192 LBS

## 2024-02-02 DIAGNOSIS — Z86.39 HISTORY OF DIABETES MELLITUS: ICD-10-CM

## 2024-02-02 DIAGNOSIS — R41.3 MEMORY LOSS: Primary | ICD-10-CM

## 2024-02-02 DIAGNOSIS — Z86.39 HISTORY OF HYPERLIPIDEMIA: ICD-10-CM

## 2024-02-02 PROCEDURE — G8484 FLU IMMUNIZE NO ADMIN: HCPCS | Performed by: PSYCHIATRY & NEUROLOGY

## 2024-02-02 PROCEDURE — 99214 OFFICE O/P EST MOD 30 MIN: CPT | Performed by: PSYCHIATRY & NEUROLOGY

## 2024-02-02 PROCEDURE — 3017F COLORECTAL CA SCREEN DOC REV: CPT | Performed by: PSYCHIATRY & NEUROLOGY

## 2024-02-02 PROCEDURE — G8427 DOCREV CUR MEDS BY ELIG CLIN: HCPCS | Performed by: PSYCHIATRY & NEUROLOGY

## 2024-02-02 PROCEDURE — 1123F ACP DISCUSS/DSCN MKR DOCD: CPT | Performed by: PSYCHIATRY & NEUROLOGY

## 2024-02-02 PROCEDURE — G8417 CALC BMI ABV UP PARAM F/U: HCPCS | Performed by: PSYCHIATRY & NEUROLOGY

## 2024-02-02 PROCEDURE — 1036F TOBACCO NON-USER: CPT | Performed by: PSYCHIATRY & NEUROLOGY

## 2024-02-02 RX ORDER — HYDROCODONE BITARTRATE AND ACETAMINOPHEN 5; 325 MG/1; MG/1
1 TABLET ORAL EVERY 6 HOURS PRN
COMMUNITY

## 2024-02-02 RX ORDER — BUDESONIDE, GLYCOPYRROLATE, AND FORMOTEROL FUMARATE 160; 9; 4.8 UG/1; UG/1; UG/1
AEROSOL, METERED RESPIRATORY (INHALATION)
COMMUNITY

## 2024-02-02 RX ORDER — TIRZEPATIDE 2.5 MG/.5ML
INJECTION, SOLUTION SUBCUTANEOUS
COMMUNITY

## 2024-02-02 NOTE — PROGRESS NOTES
REVIEW OF SYSTEMS    Constitutional: []Fever []Sweat []Chills [] Recent Injury [x] Denies all unless marked  HEENT:[]Headache  [] Head Injury/Hearing Loss  [] Sore Throat  [] Ear Ache/Dizziness  [x] Denies all unless marked  Spine:  [] Neck pain  [] Back pain  [] Sciaticia  [x] Denies all unless marked  Cardiovascular:[]Heart Disease []Chest Pain [] Palpitations  [x] Denies all unless marked  Pulmonary: []Shortness of Breath []Cough   [x] Denies all unless marke  Gastrointestinal: []Nausea  []Vomiting  []Abdominal Pain  []Constipation  []Diarrhea  []Dark Bloody Stools  [x] Denies all unless marked  Psychiatric/Behavioral:[] Depression [] Anxiety [x] Denies all unless marked  Genitourinary:   [] Frequency  [] Urgency  [] Incontinence [] Pain with Urination  [x] Denies all unless marked  Extremities: []Pain  []Swelling  [x] Denies all unless marked  Musculoskeletal: [] Muscle Pain  [] Joint Pain  [] Arthritis [] Muscle Cramps [] Muscle Twitches  [x] Denies all unless marked  Sleep: [] Insomnia [] Snoring [] Restless Legs [] Sleep Apnea  [] Daytime Sleepiness  [x] Denies all unless marked  Skin:[] Rash [] Skin Discoloration [x] Denies all unless marked   Neurological: [x]Visual Disturbance/Memory Loss [] Loss of Balance [] Slurred Speech/Weakness [] Seizures  [] Vertigo/Dizziness [x] Denies all unless marked       
chest wall appears symmetric, good expansion,   normal effort without use of accessory muscles  Cardiovascular- RRR  Musculoskeletal - no significant wasting of muscles noted, no bony deformities, gait no gross ataxia  Extremities-no clubbing, cyanosis or edema  Skin - warm, dry, and intact.  No rash, erythema, or pallor.  Psychiatric - mood, affect, and behavior appear normal.      Neurological exam  Awake, alert, fluent oriented x 3 appropriate affect  Attention and concentration appear appropriate.  Good remote memory.  He knew the day of the week and our most recent holiday.  Followed complex commands well.  Speech normal without dysarthria  No clear issues with language of fund of knowledge    Cranial Nerve Exam   CN II- Visual fields grossly unremarkable. VA adequate.   CN III, IV,VI- PERRLA, EOMI, No nystagmus, conjugate eye movements, no ptosis  CN VII-no facial asymmetry  CN VIII-Hearing intact   CN IX and X- Palate elevates in midline  CN XI-good shoulder shrug  CN XII-Tongue midline with no fasciculations or fibrillations    Motor Exam  V/V throughout upper and lower extremities bilaterally, no cogwheeling, normal tone      Reflexes trace at the knees and otherwise absent.  No Babinski.    Tremors- no tremors in hands or head noted    Gait  Normal base and speed  No ataxia. No Romberg sign    Coordination  Finger to nose and MILLY-unremarkable      Assessment    ICD-10-CM    1. Memory loss  R41.3       2. History of diabetes mellitus  Z86.39       3. 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  Continue on Aricept.  Consider increasing the dose later.  Hyperlipidemia and diabetes are treated.    Plan          Return in about 4 months (around 6/2/2024).    (Please note that portions of this note were completed with a voice recognition program. Efforts were made to edit the dictations but occasionally words are mis-transcribed.)

## 2024-02-21 ENCOUNTER — ANESTHESIA (OUTPATIENT)
Dept: GASTROENTEROLOGY | Facility: HOSPITAL | Age: 69
End: 2024-02-21
Payer: MEDICARE

## 2024-02-21 ENCOUNTER — HOSPITAL ENCOUNTER (OUTPATIENT)
Facility: HOSPITAL | Age: 69
Setting detail: HOSPITAL OUTPATIENT SURGERY
Discharge: HOME | End: 2024-02-21
Attending: INTERNAL MEDICINE | Admitting: INTERNAL MEDICINE
Payer: MEDICARE

## 2024-02-21 ENCOUNTER — ANESTHESIA EVENT (OUTPATIENT)
Dept: GASTROENTEROLOGY | Facility: HOSPITAL | Age: 69
End: 2024-02-21
Payer: MEDICARE

## 2024-02-21 VITALS
BODY MASS INDEX: 31.02 KG/M2 | OXYGEN SATURATION: 96 % | HEIGHT: 66 IN | TEMPERATURE: 97.2 F | WEIGHT: 193 LBS | RESPIRATION RATE: 21 BRPM | SYSTOLIC BLOOD PRESSURE: 157 MMHG | DIASTOLIC BLOOD PRESSURE: 74 MMHG | HEART RATE: 64 BPM

## 2024-02-21 DIAGNOSIS — Z86.010 HX OF ADENOMATOUS COLONIC POLYPS: ICD-10-CM

## 2024-02-21 LAB — GLUCOSE BLDC GLUCOMTR-MCNC: 167 MG/DL (ref 70–130)

## 2024-02-21 PROCEDURE — 25810000003 SODIUM CHLORIDE 0.9 % SOLUTION: Performed by: ANESTHESIOLOGY

## 2024-02-21 PROCEDURE — 88305 TISSUE EXAM BY PATHOLOGIST: CPT | Performed by: INTERNAL MEDICINE

## 2024-02-21 PROCEDURE — 25010000002 PROPOFOL 10 MG/ML EMULSION

## 2024-02-21 PROCEDURE — 82948 REAGENT STRIP/BLOOD GLUCOSE: CPT

## 2024-02-21 RX ORDER — SODIUM CHLORIDE 9 MG/ML
500 INJECTION, SOLUTION INTRAVENOUS CONTINUOUS PRN
Status: DISCONTINUED | OUTPATIENT
Start: 2024-02-21 | End: 2024-02-21 | Stop reason: HOSPADM

## 2024-02-21 RX ORDER — LIDOCAINE HYDROCHLORIDE 20 MG/ML
INJECTION, SOLUTION EPIDURAL; INFILTRATION; INTRACAUDAL; PERINEURAL AS NEEDED
Status: DISCONTINUED | OUTPATIENT
Start: 2024-02-21 | End: 2024-02-21 | Stop reason: SURG

## 2024-02-21 RX ORDER — SODIUM CHLORIDE 0.9 % (FLUSH) 0.9 %
10 SYRINGE (ML) INJECTION AS NEEDED
Status: DISCONTINUED | OUTPATIENT
Start: 2024-02-21 | End: 2024-02-21 | Stop reason: HOSPADM

## 2024-02-21 RX ORDER — PROPOFOL 10 MG/ML
VIAL (ML) INTRAVENOUS AS NEEDED
Status: DISCONTINUED | OUTPATIENT
Start: 2024-02-21 | End: 2024-02-21 | Stop reason: SURG

## 2024-02-21 RX ADMIN — PROPOFOL INJECTABLE EMULSION 400 MG: 10 INJECTION, EMULSION INTRAVENOUS at 08:58

## 2024-02-21 RX ADMIN — LIDOCAINE HYDROCHLORIDE 100 MG: 20 INJECTION, SOLUTION EPIDURAL; INFILTRATION; INTRACAUDAL; PERINEURAL at 08:58

## 2024-02-21 RX ADMIN — SODIUM CHLORIDE 500 ML: 9 INJECTION, SOLUTION INTRAVENOUS at 08:14

## 2024-02-21 NOTE — ANESTHESIA PREPROCEDURE EVALUATION
Anesthesia Evaluation     Patient summary reviewed   no history of anesthetic complications:   NPO Solid Status: > 8 hours             Airway   Mallampati: II  Dental      Pulmonary    (+) ,sleep apnea  Cardiovascular   Exercise tolerance: excellent (>7 METS)    (+) hypertension, hyperlipidemia      Neuro/Psych- negative ROS  GI/Hepatic/Renal/Endo    (+) obesity, GERD, diabetes mellitus    Musculoskeletal     Abdominal    Substance History      OB/GYN          Other                    Anesthesia Plan    ASA 2     MAC       Anesthetic plan, risks, benefits, and alternatives have been provided, discussed and informed consent has been obtained with: patient.    CODE STATUS:

## 2024-02-21 NOTE — H&P
[facility] Gastroenterology  Pre Procedure History & Physical    Chief Complaint:   Reflux, history of polyps    Subjective     HPI:   Here for endoscopy.  Reflux, also for colonoscopy.  History of polyps    Past Medical History:   Past Medical History:   Diagnosis Date    GERD (gastroesophageal reflux disease)     Hyperlipidemia     Hypertension     Sleep apnea     cpap at hs       Past Surgical History:  Past Surgical History:   Procedure Laterality Date    CATARACT EXTRACTION Bilateral     COLONOSCOPY N/A 09/24/2018    6 mm hyperplastic polyp was found at 20 cm proximal to the anus, tics sigmoid colon    COLONOSCOPY W/ POLYPECTOMY  05/13/2013    TUBULAR ADENOMA POLYP AT 60 CM, HYPERPLASTIC POLYP AT 30 CM, DIVERTICULOSIS SIGMOID COLON, RECALL 5YRS    COLONOSCOPY W/ POLYPECTOMY  02/26/2008    HYPERPLASTIC POLYPS RECTUM, SCATTERED DIVERTICULA       Family History:  Family History   Problem Relation Age of Onset    Heart disease Maternal Grandfather     Heart attack Maternal Grandfather     Colon cancer Neg Hx     GI problems Neg Hx     Colon polyps Neg Hx        Social History:   reports that he has never smoked. He has never used smokeless tobacco. He reports current alcohol use. He reports that he does not use drugs.    Medications:   Prior to Admission medications    Medication Sig Start Date End Date Taking? Authorizing Provider   amLODIPine (NORVASC) 5 MG tablet Take 1 tablet by mouth Daily.   Yes Lizette Zaragoza MD   aspirin 81 MG EC tablet Take 1 tablet by mouth Daily.   Yes Lizette Zaragoza MD   glimepiride (AMARYL) 4 MG tablet Take 1 tablet by mouth Every Morning Before Breakfast. 9/20/23  Yes Lizette Zaragoza MD   HYDROcodone-acetaminophen (NORCO) 5-325 MG per tablet Take 1 tablet by mouth Every 6 (Six) Hours As Needed.   Yes Provider, Historical, MD   Mounjaro 2.5 MG/0.5ML solution pen-injector Inject 2.5 mL under the skin into the appropriate area as directed 1 (One) Time Per Week.  "11/20/23  Yes Lizette Zaragoza MD   olmesartan (BENICAR) 20 MG tablet Take 1 tablet by mouth Daily.   Yes Lizette Zaragoza MD   pantoprazole (PROTONIX) 40 MG EC tablet Take 1 tablet by mouth Daily. 11/30/23  Yes Maria D Ludwig APRN   simvastatin (ZOCOR) 20 MG tablet Take 1 tablet by mouth Every Night.   Yes Lizette Zaragoza MD   Azelastine HCl 137 MCG/SPRAY solution  9/23/19   Lizette Zaragoza MD   fluticasone (FLONASE) 50 MCG/ACT nasal spray  9/23/19   Lizette Zaragoza MD   polyethylene glycol (GoLYTELY) 236 g solution Take as directed by office instructions. 11/30/23   Maria D Ludwig APRN       Allergies:  Patient has no known allergies.    Objective     Blood pressure (!) 185/67, pulse 52, temperature 97.2 °F (36.2 °C), temperature source Temporal, resp. rate 18, height 166.4 cm (65.5\"), weight 87.5 kg (193 lb), SpO2 96%.    Physical Exam   Constitutional: Pt is oriented to person, place, and in no distress.   HENT: Mouth/Throat: Oropharynx is clear.   Cardiovascular: Normal rate, regular rhythm.    Pulmonary/Chest: Effort normal. No respiratory distress. No  wheezes.   Abdominal: Soft. Non-distended.  Skin: Skin is warm and dry.   Psychiatric: Mood, memory, affect and judgment appear normal.     Assessment & Plan     Diagnosis:  Reflux, history of polyps    Anticipated Surgical Procedure:    Proceed with endoscopy and colonoscopy as scheduled    The following major R/B/A were discussed with the patient, however the list is not all inclusive . Risk:  Bleeding (immediate and delayed), perforation (rupture or tear), reaction to medication, missed lesion/cancer, pain during the procedure, infection, need for surgery, need for ostomy, need for mechanical ventilation (breathing machine), death.  Benefits: removal of polyp/tissue, burn/clip/or inject to stop bleeding, removal of foreign body, dilate any stricture.  Alternatives: Xray or CT, surgery, do nothing with associated risk "   The patient was given time to ask question and received explanation, and agrees to proceed as per History and Physical.   No guarantee given or expressed.    EMR Dragon/transcription disclaimer: Much of this encounter note is an electronic transcription/translation of spoken language to printed text.  The electronic translation of spoken language may permit erroneous, or at times, nonsensical words or phrases to be inadvertently transcribed.  Although I have reviewed the note for such errors, some may still exist.    Zack Moser MD  08:58 CST  2/21/2024

## 2024-02-21 NOTE — ANESTHESIA POSTPROCEDURE EVALUATION
Patient: Ghulam Artis    Procedure Summary       Date: 02/21/24 Room / Location: [facility] ENDOSCOPY 4 / [facility]    Anesthesia Start: 0855 Anesthesia Stop: 0934    Procedures:       ESOPHAGOGASTRODUODENOSCOPY WITH ANESTHESIA      COLONOSCOPY WITH ANESTHESIA Diagnosis:       Hx of adenomatous colonic polyps      (Hx of adenomatous colonic polyps [Z86.010])    Surgeons: Zack Moser MD Provider: Samson Dean CRNA    Anesthesia Type: MAC ASA Status: 2            Anesthesia Type: MAC    Vitals  Vitals Value Taken Time   /90 02/21/24 0941   Temp     Pulse 64 02/21/24 0945   Resp     SpO2 96 % 02/21/24 0945   Vitals shown include unfiled device data.        Post Anesthesia Care and Evaluation    Patient location during evaluation: PHASE II  Patient participation: complete - patient participated  Level of consciousness: awake and alert  Pain score: 0    Airway patency: patent  Anesthetic complications: No anesthetic complications  PONV Status: none  Cardiovascular status: acceptable  Respiratory status: acceptable  Hydration status: acceptable  No anesthesia care post op

## 2024-02-22 LAB
CYTO UR: NORMAL
LAB AP CASE REPORT: NORMAL
Lab: NORMAL
PATH REPORT.FINAL DX SPEC: NORMAL
PATH REPORT.GROSS SPEC: NORMAL

## 2024-03-18 RX ORDER — PANTOPRAZOLE SODIUM 40 MG/1
80 TABLET, DELAYED RELEASE ORAL
Qty: 60 TABLET | Refills: 1 | Status: SHIPPED | OUTPATIENT
Start: 2024-03-18

## 2024-03-26 ENCOUNTER — OFFICE VISIT (OUTPATIENT)
Dept: ENT CLINIC | Age: 69
End: 2024-03-26
Payer: MEDICARE

## 2024-03-26 VITALS
WEIGHT: 192 LBS | BODY MASS INDEX: 29.1 KG/M2 | HEIGHT: 68 IN | SYSTOLIC BLOOD PRESSURE: 136 MMHG | DIASTOLIC BLOOD PRESSURE: 82 MMHG

## 2024-03-26 DIAGNOSIS — K21.9 LARYNGOPHARYNGEAL REFLUX (LPR): Primary | ICD-10-CM

## 2024-03-26 DIAGNOSIS — R49.0 HOARSENESS OF VOICE: ICD-10-CM

## 2024-03-26 PROCEDURE — 99213 OFFICE O/P EST LOW 20 MIN: CPT | Performed by: PHYSICIAN ASSISTANT

## 2024-03-26 PROCEDURE — 1036F TOBACCO NON-USER: CPT | Performed by: PHYSICIAN ASSISTANT

## 2024-03-26 PROCEDURE — 1123F ACP DISCUSS/DSCN MKR DOCD: CPT | Performed by: PHYSICIAN ASSISTANT

## 2024-03-26 PROCEDURE — 3017F COLORECTAL CA SCREEN DOC REV: CPT | Performed by: PHYSICIAN ASSISTANT

## 2024-03-26 PROCEDURE — G8417 CALC BMI ABV UP PARAM F/U: HCPCS | Performed by: PHYSICIAN ASSISTANT

## 2024-03-26 PROCEDURE — G8484 FLU IMMUNIZE NO ADMIN: HCPCS | Performed by: PHYSICIAN ASSISTANT

## 2024-03-26 PROCEDURE — G8427 DOCREV CUR MEDS BY ELIG CLIN: HCPCS | Performed by: PHYSICIAN ASSISTANT

## 2024-03-26 RX ORDER — PANTOPRAZOLE SODIUM 40 MG/1
80 TABLET, DELAYED RELEASE ORAL
Qty: 60 TABLET | Refills: 2 | Status: SHIPPED | OUTPATIENT
Start: 2024-03-26

## 2024-03-26 NOTE — PROGRESS NOTES
Mr. Pate is a pleasant 68-year-old  male that presents for a 6-week follow-up due to treatment for LPR.  Patient recently completed an upper GI endoscopy and colonoscopy by Dr. Jorje Arthur.  He reports that he did not have any evidence of an esophageal stricture and was told that the gastric lining looked normal with no gastritis.  Colonoscopy demonstrated multiple polyps that pathologically was benign.  Currently he reports that his symptoms are improved with dysphagia only occurring sporadically with spit and liquids.  He reports that the vocal hoarseness is improved but has not totally resolved.  He reports he has had this for over a year after COVID.      Physical examination demonstrated the patient normal vocals with no hoarseness.  Ears demonstrate normal TMs canals bilaterally.  Oral exam demonstrated no erythematous changes to the posterior pharynx with no masses.  Tongue surface would not be normal.  Neck exam demonstrated no lymphadenopathy or thyromegaly.      Impression: Clinically improving LPR    Plan: Due to the patient having a recent endoscopy, I recommended continue the Protonix twice a day for another 6 weeks.  He is to follow-up in 6 weeks for reevaluation.  At that time we will consider weaning him down to 1 dose a day.  He was also advised to call if his dysphagia worsens.  I advised the patient that we could do esophagram to rule out dysmotility issues.        Electronically signed by ROMAN AHUMADA PA-C on 3/26/24 at 2:36 PM [place]

## 2024-05-14 ENCOUNTER — OFFICE VISIT (OUTPATIENT)
Dept: ENT CLINIC | Age: 69
End: 2024-05-14
Payer: MEDICARE

## 2024-05-14 VITALS
BODY MASS INDEX: 29.1 KG/M2 | DIASTOLIC BLOOD PRESSURE: 68 MMHG | HEIGHT: 68 IN | WEIGHT: 192 LBS | SYSTOLIC BLOOD PRESSURE: 130 MMHG

## 2024-05-14 DIAGNOSIS — K21.9 LARYNGOPHARYNGEAL REFLUX (LPR): Primary | ICD-10-CM

## 2024-05-14 DIAGNOSIS — K22.4 DIFFUSE ESOPHAGEAL SPASM: ICD-10-CM

## 2024-05-14 PROCEDURE — 99213 OFFICE O/P EST LOW 20 MIN: CPT | Performed by: PHYSICIAN ASSISTANT

## 2024-05-14 PROCEDURE — 1036F TOBACCO NON-USER: CPT | Performed by: PHYSICIAN ASSISTANT

## 2024-05-14 PROCEDURE — G8427 DOCREV CUR MEDS BY ELIG CLIN: HCPCS | Performed by: PHYSICIAN ASSISTANT

## 2024-05-14 PROCEDURE — G8417 CALC BMI ABV UP PARAM F/U: HCPCS | Performed by: PHYSICIAN ASSISTANT

## 2024-05-14 PROCEDURE — 1123F ACP DISCUSS/DSCN MKR DOCD: CPT | Performed by: PHYSICIAN ASSISTANT

## 2024-05-14 PROCEDURE — 3017F COLORECTAL CA SCREEN DOC REV: CPT | Performed by: PHYSICIAN ASSISTANT

## 2024-05-14 RX ORDER — HYOSCYAMINE SULFATE 0.12 MG/1
TABLET SUBLINGUAL
Qty: 90 EACH | Refills: 1 | Status: SHIPPED | OUTPATIENT
Start: 2024-05-14

## 2024-05-14 RX ORDER — PANTOPRAZOLE SODIUM 40 MG/1
40 TABLET, DELAYED RELEASE ORAL DAILY
Qty: 90 TABLET | Refills: 0 | Status: SHIPPED | OUTPATIENT
Start: 2024-05-14

## 2024-05-14 NOTE — PROGRESS NOTES
Mr. Pate is a pleasant 68-year-old  male that presents for a 6-week follow-up after treatment for LPR.  Patient reports he believes he is back to baseline with no symptoms of reflux or any globus sensation.  He is still having sporadic esophageal spasms with his saliva as well as some liquids.  He denies any dysphagia with solids.  Also denies any vocal hoarseness.      Physical examination revealed the patient to have normal vocals with no hoarseness.  Ears demonstrate normal TMs canals bilaterally.  Oral exam demonstrated no erythematous changes to the posterior pharynx with no remarkable findings to the surface of the tongue.  Neck exam demonstrated no lymphadenopathy or thyromegaly.      Impression: Clinically resolved LPR, esophageal spasms with liquids    Plan: I recommended placing the patient on Levsin for the esophageal spasms to see if this helps with his symptoms.  Due to the reflux subsiding, will decrease his Protonix to 40 mg once a day.  He is to follow-up in 6 weeks for reevaluation.  He was reminded to call if he has any questions or concerns.  Of note, patient has not had an esophagram at this point.        Electronically signed by ROMAN AHUMADA PA-C on 5/14/24 at 12:30 PM [place]

## 2024-06-03 ENCOUNTER — OFFICE VISIT (OUTPATIENT)
Dept: NEUROLOGY | Age: 69
End: 2024-06-03
Payer: MEDICARE

## 2024-06-03 VITALS
HEIGHT: 68 IN | BODY MASS INDEX: 30.31 KG/M2 | DIASTOLIC BLOOD PRESSURE: 74 MMHG | WEIGHT: 200 LBS | SYSTOLIC BLOOD PRESSURE: 179 MMHG | HEART RATE: 60 BPM

## 2024-06-03 DIAGNOSIS — Z86.39 HISTORY OF DIABETES MELLITUS: ICD-10-CM

## 2024-06-03 DIAGNOSIS — R41.3 MEMORY LOSS: Primary | ICD-10-CM

## 2024-06-03 DIAGNOSIS — Z86.39 HISTORY OF HYPERLIPIDEMIA: ICD-10-CM

## 2024-06-03 PROCEDURE — G8417 CALC BMI ABV UP PARAM F/U: HCPCS | Performed by: PSYCHIATRY & NEUROLOGY

## 2024-06-03 PROCEDURE — 1123F ACP DISCUSS/DSCN MKR DOCD: CPT | Performed by: PSYCHIATRY & NEUROLOGY

## 2024-06-03 PROCEDURE — 99213 OFFICE O/P EST LOW 20 MIN: CPT | Performed by: PSYCHIATRY & NEUROLOGY

## 2024-06-03 PROCEDURE — G8427 DOCREV CUR MEDS BY ELIG CLIN: HCPCS | Performed by: PSYCHIATRY & NEUROLOGY

## 2024-06-03 PROCEDURE — 1036F TOBACCO NON-USER: CPT | Performed by: PSYCHIATRY & NEUROLOGY

## 2024-06-03 PROCEDURE — 3017F COLORECTAL CA SCREEN DOC REV: CPT | Performed by: PSYCHIATRY & NEUROLOGY

## 2024-06-03 RX ORDER — DONEPEZIL HYDROCHLORIDE 5 MG/1
5 TABLET, FILM COATED ORAL NIGHTLY
Qty: 90 TABLET | Refills: 3 | Status: SHIPPED | OUTPATIENT
Start: 2024-06-03

## 2024-06-03 NOTE — PROGRESS NOTES
Chief Complaint   Patient presents with    Follow-up    Memory Loss     Patient states that the aricept has improved his memory some.        Albert Pate is a 68 y.o. year old male who is seen for evaluation of his poor short-term memory.  He is here today with his wife.  He complains of forgetfulness for years but over the past year or more it has been progressively worse.  He has difficulty remembering why he walked into the next room.  He forgets weekly meetings that he should remember.  He has had at least once where he could not remember where he was while he was out driving.  His father and grandmother appeared to have dementia.  Patient denies any focal neurological difficulties.  He does have diabetes, hypertension and hyperlipidemia.  Denies any change in his memory with any of his medications..  Prior computerized neuropsychological testing came back within normal limits.    MRI the brain showing minimal small vessel ischemic change.  TSH and B12 normal.    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  He was placed on Aricept at that time.  His wife was afraid for him to try it due to a low heart rate and so Namenda was ordered.  He is now on Aricept 5 mg a day and having no difficulties.  Holding his own.  Heart rate remains in the 40s and 50s which is chronic.  Last seen here 2/24 and no changes were made at that time.  No further TIAs or other complaints today.  Active Ambulatory Problems     Diagnosis Date Noted    Bradycardia 10/15/2019    Cigarette nicotine dependence without complication 10/15/2019    Hemoptysis 10/15/2019    DEEDEE (obstructive sleep apnea) 10/15/2019    Overweight 10/15/2019    Restrictive lung disease 10/15/2019    Impacted cerumen of right ear 10/30/2023    Hoarseness of voice 10/30/2023    Laryngopharyngeal reflux (LPR) 12/11/2023     Resolved Ambulatory Problems     Diagnosis Date Noted    No Resolved Ambulatory Problems     Past Medical History:   Diagnosis

## 2024-07-02 ENCOUNTER — OFFICE VISIT (OUTPATIENT)
Dept: ENT CLINIC | Age: 69
End: 2024-07-02
Payer: MEDICARE

## 2024-07-02 VITALS
WEIGHT: 199 LBS | HEIGHT: 68 IN | DIASTOLIC BLOOD PRESSURE: 70 MMHG | BODY MASS INDEX: 30.16 KG/M2 | SYSTOLIC BLOOD PRESSURE: 132 MMHG

## 2024-07-02 DIAGNOSIS — K22.4 DIFFUSE ESOPHAGEAL SPASM: ICD-10-CM

## 2024-07-02 DIAGNOSIS — K21.9 LARYNGOPHARYNGEAL REFLUX (LPR): Primary | ICD-10-CM

## 2024-07-02 PROCEDURE — G8427 DOCREV CUR MEDS BY ELIG CLIN: HCPCS | Performed by: PHYSICIAN ASSISTANT

## 2024-07-02 PROCEDURE — 3017F COLORECTAL CA SCREEN DOC REV: CPT | Performed by: PHYSICIAN ASSISTANT

## 2024-07-02 PROCEDURE — 1123F ACP DISCUSS/DSCN MKR DOCD: CPT | Performed by: PHYSICIAN ASSISTANT

## 2024-07-02 PROCEDURE — 1036F TOBACCO NON-USER: CPT | Performed by: PHYSICIAN ASSISTANT

## 2024-07-02 PROCEDURE — G8417 CALC BMI ABV UP PARAM F/U: HCPCS | Performed by: PHYSICIAN ASSISTANT

## 2024-07-02 PROCEDURE — 99213 OFFICE O/P EST LOW 20 MIN: CPT | Performed by: PHYSICIAN ASSISTANT

## 2024-07-02 RX ORDER — PANTOPRAZOLE SODIUM 40 MG/1
40 TABLET, DELAYED RELEASE ORAL DAILY
Qty: 90 TABLET | Refills: 1 | Status: SHIPPED | OUTPATIENT
Start: 2024-07-02

## 2024-07-02 NOTE — PROGRESS NOTES
Mr. Pate is a pleasant 68-year-old  male that presents for a 6-week follow-up due to LPR and esophageal spasms.  He reports that he is currently taking 1 PPI day and reports that his symptoms have remained stable with no obvious reflux or vocal hoarseness.  He is still having some sporadic dysphagia that occurs about once every week to 10 days and is mild.  He forgets to take his NuLev for the esophageal spasms.      Physical examination revealed the patient to have normal vocals with no hoarseness.  Ears demonstrated a small amount cerumen bilaterally that was removed with suction with no complications.  The TMs appear to be normal.  Nasal exam demonstrated no abnormalities.  Oral exam demonstrated no erythematous changes to the posterior pharynx with no masses.  Tongue surface appeared to be normal.  Neck exam demonstrated no lymphadenopathy or thyromegaly.      Impression: Clinically resolving LPR, sporadic dysphagia suspicious for esophageal spasms    Plan: I advised the patient to try to utilize the Hycosamine as needed for the spasms.  If his symptoms worsen, would consider proceeding with esophagram.  Due to his LPR being back to baseline, he is follow-up with me as needed.  He was reminded to call if he has any questions or concerns.        Electronically signed by ROMAN AHUMADA PA-C on 7/2/24 at 12:22 PM [place]

## 2024-08-20 ENCOUNTER — APPOINTMENT (OUTPATIENT)
Dept: CARDIOLOGY | Facility: HOSPITAL | Age: 69
End: 2024-08-20
Payer: MEDICARE

## 2024-08-20 ENCOUNTER — APPOINTMENT (OUTPATIENT)
Dept: CT IMAGING | Facility: HOSPITAL | Age: 69
End: 2024-08-20
Payer: MEDICARE

## 2024-08-20 ENCOUNTER — HOSPITAL ENCOUNTER (INPATIENT)
Facility: HOSPITAL | Age: 69
LOS: 1 days | Discharge: HOME HEALTH | End: 2024-08-21
Attending: EMERGENCY MEDICINE | Admitting: FAMILY MEDICINE
Payer: MEDICARE

## 2024-08-20 ENCOUNTER — APPOINTMENT (OUTPATIENT)
Dept: GENERAL RADIOLOGY | Facility: HOSPITAL | Age: 69
End: 2024-08-20
Payer: MEDICARE

## 2024-08-20 ENCOUNTER — APPOINTMENT (OUTPATIENT)
Dept: NEUROLOGY | Facility: HOSPITAL | Age: 69
End: 2024-08-20
Payer: MEDICARE

## 2024-08-20 ENCOUNTER — APPOINTMENT (OUTPATIENT)
Dept: MRI IMAGING | Facility: HOSPITAL | Age: 69
End: 2024-08-20
Payer: MEDICARE

## 2024-08-20 DIAGNOSIS — R13.10 DYSPHAGIA, UNSPECIFIED TYPE: ICD-10-CM

## 2024-08-20 DIAGNOSIS — R41.82 ALTERED MENTAL STATUS, UNSPECIFIED ALTERED MENTAL STATUS TYPE: Primary | ICD-10-CM

## 2024-08-20 DIAGNOSIS — Z74.09 IMPAIRED MOBILITY: ICD-10-CM

## 2024-08-20 PROBLEM — I65.21 RIGHT INTERNAL CAROTID OCCLUSION: Status: ACTIVE | Noted: 2024-08-20

## 2024-08-20 PROBLEM — R26.89 BALANCE DISORDER: Status: ACTIVE | Noted: 2024-08-20

## 2024-08-20 PROBLEM — I10 PRIMARY HYPERTENSION: Status: ACTIVE | Noted: 2024-08-20

## 2024-08-20 LAB
ABO GROUP BLD: NORMAL
ALBUMIN SERPL-MCNC: 3.5 G/DL (ref 3.5–5.2)
ALBUMIN SERPL-MCNC: 3.7 G/DL (ref 3.5–5.2)
ALBUMIN/GLOB SERPL: 1.1 G/DL
ALBUMIN/GLOB SERPL: 1.2 G/DL
ALP SERPL-CCNC: 61 U/L (ref 39–117)
ALP SERPL-CCNC: 67 U/L (ref 39–117)
ALT SERPL W P-5'-P-CCNC: 25 U/L (ref 1–41)
ALT SERPL W P-5'-P-CCNC: 29 U/L (ref 1–41)
ANION GAP SERPL CALCULATED.3IONS-SCNC: 11 MMOL/L (ref 5–15)
ANION GAP SERPL CALCULATED.3IONS-SCNC: 14 MMOL/L (ref 5–15)
APTT PPP: 31.5 SECONDS (ref 24.5–36)
AST SERPL-CCNC: 15 U/L (ref 1–40)
AST SERPL-CCNC: 21 U/L (ref 1–40)
BACTERIA UR QL AUTO: ABNORMAL /HPF
BASOPHILS # BLD AUTO: 0.05 10*3/MM3 (ref 0–0.2)
BASOPHILS NFR BLD AUTO: 0.4 % (ref 0–1.5)
BH CV ECHO MEAS - AO MAX PG: 10.2 MMHG
BH CV ECHO MEAS - AO MEAN PG: 4.7 MMHG
BH CV ECHO MEAS - AO ROOT DIAM: 3.2 CM
BH CV ECHO MEAS - AO V2 MAX: 160 CM/SEC
BH CV ECHO MEAS - AO V2 VTI: 25.4 CM
BH CV ECHO MEAS - AVA(I,D): 2.8 CM2
BH CV ECHO MEAS - EDV(MOD-SP4): 88.4 ML
BH CV ECHO MEAS - EF(MOD-SP4): 59.5 %
BH CV ECHO MEAS - ESV(MOD-SP4): 35.8 ML
BH CV ECHO MEAS - LA DIMENSION: 3.2 CM
BH CV ECHO MEAS - LAT PEAK E' VEL: 7.1 CM/SEC
BH CV ECHO MEAS - LV DIASTOLIC VOL/BSA (35-75): 44.6 CM2
BH CV ECHO MEAS - LV MAX PG: 5.3 MMHG
BH CV ECHO MEAS - LV MEAN PG: 3 MMHG
BH CV ECHO MEAS - LV SYSTOLIC VOL/BSA (12-30): 18.1 CM2
BH CV ECHO MEAS - LV V1 MAX: 113.9 CM/SEC
BH CV ECHO MEAS - LV V1 VTI: 23 CM
BH CV ECHO MEAS - LVOT AREA: 3.1 CM2
BH CV ECHO MEAS - LVOT DIAM: 2 CM
BH CV ECHO MEAS - MED PEAK E' VEL: 15.2 CM/SEC
BH CV ECHO MEAS - MV A MAX VEL: 115 CM/SEC
BH CV ECHO MEAS - MV DEC SLOPE: 607 CM/SEC2
BH CV ECHO MEAS - MV DEC TIME: 0.18 SEC
BH CV ECHO MEAS - MV E MAX VEL: 96.1 CM/SEC
BH CV ECHO MEAS - MV E/A: 0.84
BH CV ECHO MEAS - MV MAX PG: 7.1 MMHG
BH CV ECHO MEAS - MV MEAN PG: 3 MMHG
BH CV ECHO MEAS - MV P1/2T: 63.7 MSEC
BH CV ECHO MEAS - MV V2 VTI: 30.7 CM
BH CV ECHO MEAS - MVA(P1/2T): 3.5 CM2
BH CV ECHO MEAS - MVA(VTI): 2.35 CM2
BH CV ECHO MEAS - PA V2 MAX: 121.5 CM/SEC
BH CV ECHO MEAS - SV(LVOT): 72.3 ML
BH CV ECHO MEAS - SV(MOD-SP4): 52.6 ML
BH CV ECHO MEAS - SVI(LVOT): 36.4 ML/M2
BH CV ECHO MEAS - SVI(MOD-SP4): 26.5 ML/M2
BH CV ECHO MEAS - TAPSE (>1.6): 1.76 CM
BH CV ECHO MEASUREMENTS AVERAGE E/E' RATIO: 8.62
BH CV XLRA - TDI S': 24.4 CM/SEC
BILIRUB SERPL-MCNC: 0.7 MG/DL (ref 0–1.2)
BILIRUB SERPL-MCNC: 0.8 MG/DL (ref 0–1.2)
BILIRUB UR QL STRIP: NEGATIVE
BLD GP AB SCN SERPL QL: NEGATIVE
BUN SERPL-MCNC: 8 MG/DL (ref 8–23)
BUN SERPL-MCNC: 9 MG/DL (ref 8–23)
BUN/CREAT SERPL: 11.5 (ref 7–25)
BUN/CREAT SERPL: 9.8 (ref 7–25)
CALCIUM SPEC-SCNC: 9.1 MG/DL (ref 8.6–10.5)
CALCIUM SPEC-SCNC: 9.2 MG/DL (ref 8.6–10.5)
CHLORIDE SERPL-SCNC: 101 MMOL/L (ref 98–107)
CHLORIDE SERPL-SCNC: 99 MMOL/L (ref 98–107)
CHOLEST SERPL-MCNC: 120 MG/DL (ref 0–200)
CLARITY UR: ABNORMAL
CO2 SERPL-SCNC: 24 MMOL/L (ref 22–29)
CO2 SERPL-SCNC: 28 MMOL/L (ref 22–29)
COLOR UR: YELLOW
CREAT SERPL-MCNC: 0.78 MG/DL (ref 0.76–1.27)
CREAT SERPL-MCNC: 0.82 MG/DL (ref 0.76–1.27)
DEPRECATED RDW RBC AUTO: 40.4 FL (ref 37–54)
DEPRECATED RDW RBC AUTO: 41.9 FL (ref 37–54)
EGFRCR SERPLBLD CKD-EPI 2021: 94.5 ML/MIN/1.73
EGFRCR SERPLBLD CKD-EPI 2021: 95.9 ML/MIN/1.73
EOSINOPHIL # BLD AUTO: 0.02 10*3/MM3 (ref 0–0.4)
EOSINOPHIL NFR BLD AUTO: 0.2 % (ref 0.3–6.2)
ERYTHROCYTE [DISTWIDTH] IN BLOOD BY AUTOMATED COUNT: 11.9 % (ref 12.3–15.4)
ERYTHROCYTE [DISTWIDTH] IN BLOOD BY AUTOMATED COUNT: 12.2 % (ref 12.3–15.4)
GLOBULIN UR ELPH-MCNC: 3.2 GM/DL
GLOBULIN UR ELPH-MCNC: 3.2 GM/DL
GLUCOSE BLDC GLUCOMTR-MCNC: 150 MG/DL (ref 70–130)
GLUCOSE BLDC GLUCOMTR-MCNC: 153 MG/DL (ref 70–130)
GLUCOSE BLDC GLUCOMTR-MCNC: 156 MG/DL (ref 70–130)
GLUCOSE BLDC GLUCOMTR-MCNC: 167 MG/DL (ref 70–130)
GLUCOSE BLDC GLUCOMTR-MCNC: 200 MG/DL (ref 70–130)
GLUCOSE SERPL-MCNC: 154 MG/DL (ref 65–99)
GLUCOSE SERPL-MCNC: 172 MG/DL (ref 65–99)
GLUCOSE UR STRIP-MCNC: NEGATIVE MG/DL
HBA1C MFR BLD: 6.2 % (ref 4.8–5.6)
HCT VFR BLD AUTO: 41.5 % (ref 37.5–51)
HCT VFR BLD AUTO: 41.5 % (ref 37.5–51)
HDLC SERPL-MCNC: 35 MG/DL (ref 40–60)
HGB BLD-MCNC: 13.6 G/DL (ref 13–17.7)
HGB BLD-MCNC: 13.9 G/DL (ref 13–17.7)
HGB UR QL STRIP.AUTO: ABNORMAL
HOLD SPECIMEN: NORMAL
HYALINE CASTS UR QL AUTO: ABNORMAL /LPF
IMM GRANULOCYTES # BLD AUTO: 0.19 10*3/MM3 (ref 0–0.05)
IMM GRANULOCYTES NFR BLD AUTO: 1.5 % (ref 0–0.5)
INR PPP: 1.12 (ref 0.91–1.09)
KETONES UR QL STRIP: ABNORMAL
LDLC SERPL CALC-MCNC: 65 MG/DL (ref 0–100)
LDLC/HDLC SERPL: 1.81 {RATIO}
LEFT ATRIUM VOLUME INDEX: 24.8 ML/M2
LEUKOCYTE ESTERASE UR QL STRIP.AUTO: ABNORMAL
LYMPHOCYTES # BLD AUTO: 1.45 10*3/MM3 (ref 0.7–3.1)
LYMPHOCYTES NFR BLD AUTO: 11.7 % (ref 19.6–45.3)
MCH RBC QN AUTO: 30.2 PG (ref 26.6–33)
MCH RBC QN AUTO: 30.5 PG (ref 26.6–33)
MCHC RBC AUTO-ENTMCNC: 32.8 G/DL (ref 31.5–35.7)
MCHC RBC AUTO-ENTMCNC: 33.5 G/DL (ref 31.5–35.7)
MCV RBC AUTO: 91 FL (ref 79–97)
MCV RBC AUTO: 92 FL (ref 79–97)
MONOCYTES # BLD AUTO: 0.42 10*3/MM3 (ref 0.1–0.9)
MONOCYTES NFR BLD AUTO: 3.4 % (ref 5–12)
NEUTROPHILS NFR BLD AUTO: 10.28 10*3/MM3 (ref 1.7–7)
NEUTROPHILS NFR BLD AUTO: 82.8 % (ref 42.7–76)
NITRITE UR QL STRIP: NEGATIVE
NRBC BLD AUTO-RTO: 0 /100 WBC (ref 0–0.2)
PH UR STRIP.AUTO: >=9 [PH] (ref 5–8)
PLATELET # BLD AUTO: 164 10*3/MM3 (ref 140–450)
PLATELET # BLD AUTO: 169 10*3/MM3 (ref 140–450)
PMV BLD AUTO: 8.9 FL (ref 6–12)
PMV BLD AUTO: 9.3 FL (ref 6–12)
POTASSIUM SERPL-SCNC: 3.8 MMOL/L (ref 3.5–5.2)
POTASSIUM SERPL-SCNC: 4 MMOL/L (ref 3.5–5.2)
PROT SERPL-MCNC: 6.7 G/DL (ref 6–8.5)
PROT SERPL-MCNC: 6.9 G/DL (ref 6–8.5)
PROT UR QL STRIP: ABNORMAL
PROTHROMBIN TIME: 14.9 SECONDS (ref 11.8–14.8)
QT INTERVAL: 298 MS
QTC INTERVAL: 406 MS
RBC # BLD AUTO: 4.51 10*6/MM3 (ref 4.14–5.8)
RBC # BLD AUTO: 4.56 10*6/MM3 (ref 4.14–5.8)
RBC # UR STRIP: ABNORMAL /HPF
REF LAB TEST METHOD: ABNORMAL
RH BLD: POSITIVE
SODIUM SERPL-SCNC: 137 MMOL/L (ref 136–145)
SODIUM SERPL-SCNC: 140 MMOL/L (ref 136–145)
SP GR UR STRIP: 1.03 (ref 1–1.03)
SQUAMOUS #/AREA URNS HPF: ABNORMAL /HPF
T&S EXPIRATION DATE: NORMAL
T4 FREE SERPL-MCNC: 1.05 NG/DL (ref 0.93–1.7)
TRIGL SERPL-MCNC: 108 MG/DL (ref 0–150)
TSH SERPL DL<=0.05 MIU/L-ACNC: 2.49 UIU/ML (ref 0.27–4.2)
UROBILINOGEN UR QL STRIP: ABNORMAL
VLDLC SERPL-MCNC: 20 MG/DL (ref 5–40)
WBC # UR STRIP: ABNORMAL /HPF
WBC NRBC COR # BLD AUTO: 12.41 10*3/MM3 (ref 3.4–10.8)
WBC NRBC COR # BLD AUTO: 12.84 10*3/MM3 (ref 3.4–10.8)
WHOLE BLOOD HOLD COAG: NORMAL
WHOLE BLOOD HOLD SPECIMEN: NORMAL

## 2024-08-20 PROCEDURE — 80053 COMPREHEN METABOLIC PANEL: CPT | Performed by: FAMILY MEDICINE

## 2024-08-20 PROCEDURE — 93306 TTE W/DOPPLER COMPLETE: CPT

## 2024-08-20 PROCEDURE — 70498 CT ANGIOGRAPHY NECK: CPT

## 2024-08-20 PROCEDURE — 71045 X-RAY EXAM CHEST 1 VIEW: CPT

## 2024-08-20 PROCEDURE — 87186 SC STD MICRODIL/AGAR DIL: CPT | Performed by: NURSE PRACTITIONER

## 2024-08-20 PROCEDURE — 25510000001 PERFLUTREN 6.52 MG/ML SUSPENSION: Performed by: FAMILY MEDICINE

## 2024-08-20 PROCEDURE — 70450 CT HEAD/BRAIN W/O DYE: CPT

## 2024-08-20 PROCEDURE — 99285 EMERGENCY DEPT VISIT HI MDM: CPT

## 2024-08-20 PROCEDURE — 85025 COMPLETE CBC W/AUTO DIFF WBC: CPT | Performed by: EMERGENCY MEDICINE

## 2024-08-20 PROCEDURE — 87086 URINE CULTURE/COLONY COUNT: CPT | Performed by: NURSE PRACTITIONER

## 2024-08-20 PROCEDURE — 86900 BLOOD TYPING SEROLOGIC ABO: CPT | Performed by: EMERGENCY MEDICINE

## 2024-08-20 PROCEDURE — 95819 EEG AWAKE AND ASLEEP: CPT

## 2024-08-20 PROCEDURE — 36415 COLL VENOUS BLD VENIPUNCTURE: CPT

## 2024-08-20 PROCEDURE — 25510000001 IOPAMIDOL PER 1 ML: Performed by: EMERGENCY MEDICINE

## 2024-08-20 PROCEDURE — 70496 CT ANGIOGRAPHY HEAD: CPT

## 2024-08-20 PROCEDURE — 93306 TTE W/DOPPLER COMPLETE: CPT | Performed by: INTERNAL MEDICINE

## 2024-08-20 PROCEDURE — 86850 RBC ANTIBODY SCREEN: CPT | Performed by: EMERGENCY MEDICINE

## 2024-08-20 PROCEDURE — 82607 VITAMIN B-12: CPT | Performed by: CLINICAL NURSE SPECIALIST

## 2024-08-20 PROCEDURE — 70551 MRI BRAIN STEM W/O DYE: CPT

## 2024-08-20 PROCEDURE — 81001 URINALYSIS AUTO W/SCOPE: CPT | Performed by: NURSE PRACTITIONER

## 2024-08-20 PROCEDURE — 82948 REAGENT STRIP/BLOOD GLUCOSE: CPT

## 2024-08-20 PROCEDURE — 85610 PROTHROMBIN TIME: CPT | Performed by: EMERGENCY MEDICINE

## 2024-08-20 PROCEDURE — 83036 HEMOGLOBIN GLYCOSYLATED A1C: CPT | Performed by: FAMILY MEDICINE

## 2024-08-20 PROCEDURE — 95819 EEG AWAKE AND ASLEEP: CPT | Performed by: PSYCHIATRY & NEUROLOGY

## 2024-08-20 PROCEDURE — 25010000002 ONDANSETRON PER 1 MG: Performed by: FAMILY MEDICINE

## 2024-08-20 PROCEDURE — 25010000002 ENOXAPARIN PER 10 MG: Performed by: FAMILY MEDICINE

## 2024-08-20 PROCEDURE — 92610 EVALUATE SWALLOWING FUNCTION: CPT

## 2024-08-20 PROCEDURE — 84443 ASSAY THYROID STIM HORMONE: CPT | Performed by: CLINICAL NURSE SPECIALIST

## 2024-08-20 PROCEDURE — 93005 ELECTROCARDIOGRAM TRACING: CPT | Performed by: EMERGENCY MEDICINE

## 2024-08-20 PROCEDURE — 85730 THROMBOPLASTIN TIME PARTIAL: CPT | Performed by: EMERGENCY MEDICINE

## 2024-08-20 PROCEDURE — 97161 PT EVAL LOW COMPLEX 20 MIN: CPT

## 2024-08-20 PROCEDURE — 85027 COMPLETE CBC AUTOMATED: CPT | Performed by: FAMILY MEDICINE

## 2024-08-20 PROCEDURE — 97165 OT EVAL LOW COMPLEX 30 MIN: CPT | Performed by: OCCUPATIONAL THERAPIST

## 2024-08-20 PROCEDURE — 63710000001 INSULIN LISPRO (HUMAN) PER 5 UNITS: Performed by: FAMILY MEDICINE

## 2024-08-20 PROCEDURE — 25010000002 CEFTRIAXONE PER 250 MG: Performed by: NURSE PRACTITIONER

## 2024-08-20 PROCEDURE — 25810000003 SODIUM CHLORIDE 0.9 % SOLUTION: Performed by: FAMILY MEDICINE

## 2024-08-20 PROCEDURE — 0042T HC CT CEREBRAL PERFUSION W/WO CONTRAST: CPT

## 2024-08-20 PROCEDURE — 80053 COMPREHEN METABOLIC PANEL: CPT | Performed by: EMERGENCY MEDICINE

## 2024-08-20 PROCEDURE — 84439 ASSAY OF FREE THYROXINE: CPT | Performed by: CLINICAL NURSE SPECIALIST

## 2024-08-20 PROCEDURE — 99223 1ST HOSP IP/OBS HIGH 75: CPT | Performed by: PSYCHIATRY & NEUROLOGY

## 2024-08-20 PROCEDURE — 86901 BLOOD TYPING SEROLOGIC RH(D): CPT | Performed by: EMERGENCY MEDICINE

## 2024-08-20 PROCEDURE — 80061 LIPID PANEL: CPT | Performed by: FAMILY MEDICINE

## 2024-08-20 PROCEDURE — 87077 CULTURE AEROBIC IDENTIFY: CPT | Performed by: NURSE PRACTITIONER

## 2024-08-20 RX ORDER — ACETAMINOPHEN 650 MG/1
650 SUPPOSITORY RECTAL EVERY 4 HOURS PRN
Status: DISCONTINUED | OUTPATIENT
Start: 2024-08-20 | End: 2024-08-21 | Stop reason: HOSPADM

## 2024-08-20 RX ORDER — ACETAMINOPHEN 325 MG/1
650 TABLET ORAL EVERY 4 HOURS PRN
Status: DISCONTINUED | OUTPATIENT
Start: 2024-08-20 | End: 2024-08-21 | Stop reason: HOSPADM

## 2024-08-20 RX ORDER — SODIUM CHLORIDE 0.9 % (FLUSH) 0.9 %
10 SYRINGE (ML) INJECTION AS NEEDED
Status: DISCONTINUED | OUTPATIENT
Start: 2024-08-20 | End: 2024-08-21 | Stop reason: HOSPADM

## 2024-08-20 RX ORDER — INSULIN LISPRO 100 [IU]/ML
2-7 INJECTION, SOLUTION INTRAVENOUS; SUBCUTANEOUS
Status: DISCONTINUED | OUTPATIENT
Start: 2024-08-20 | End: 2024-08-21 | Stop reason: HOSPADM

## 2024-08-20 RX ORDER — ATORVASTATIN CALCIUM 40 MG/1
40 TABLET, FILM COATED ORAL DAILY
Status: DISCONTINUED | OUTPATIENT
Start: 2024-08-20 | End: 2024-08-21 | Stop reason: HOSPADM

## 2024-08-20 RX ORDER — DEXTROSE MONOHYDRATE 25 G/50ML
25 INJECTION, SOLUTION INTRAVENOUS
Status: DISCONTINUED | OUTPATIENT
Start: 2024-08-20 | End: 2024-08-21 | Stop reason: HOSPADM

## 2024-08-20 RX ORDER — SODIUM CHLORIDE 9 MG/ML
100 INJECTION, SOLUTION INTRAVENOUS CONTINUOUS
Status: DISCONTINUED | OUTPATIENT
Start: 2024-08-20 | End: 2024-08-21 | Stop reason: HOSPADM

## 2024-08-20 RX ORDER — ASPIRIN 81 MG/1
81 TABLET ORAL DAILY
Status: DISCONTINUED | OUTPATIENT
Start: 2024-08-20 | End: 2024-08-21 | Stop reason: HOSPADM

## 2024-08-20 RX ORDER — ONDANSETRON 2 MG/ML
4 INJECTION INTRAMUSCULAR; INTRAVENOUS EVERY 6 HOURS PRN
Status: DISCONTINUED | OUTPATIENT
Start: 2024-08-20 | End: 2024-08-21 | Stop reason: HOSPADM

## 2024-08-20 RX ORDER — LOSARTAN POTASSIUM 50 MG/1
50 TABLET ORAL DAILY
Status: DISCONTINUED | OUTPATIENT
Start: 2024-08-20 | End: 2024-08-21 | Stop reason: HOSPADM

## 2024-08-20 RX ORDER — IOPAMIDOL 755 MG/ML
100 INJECTION, SOLUTION INTRAVASCULAR
Status: COMPLETED | OUTPATIENT
Start: 2024-08-20 | End: 2024-08-20

## 2024-08-20 RX ORDER — SODIUM CHLORIDE 0.9 % (FLUSH) 0.9 %
10 SYRINGE (ML) INJECTION EVERY 12 HOURS SCHEDULED
Status: DISCONTINUED | OUTPATIENT
Start: 2024-08-20 | End: 2024-08-21 | Stop reason: HOSPADM

## 2024-08-20 RX ORDER — NICOTINE POLACRILEX 4 MG
15 LOZENGE BUCCAL
Status: DISCONTINUED | OUTPATIENT
Start: 2024-08-20 | End: 2024-08-21 | Stop reason: HOSPADM

## 2024-08-20 RX ORDER — PANTOPRAZOLE SODIUM 40 MG/1
40 TABLET, DELAYED RELEASE ORAL DAILY
Status: DISCONTINUED | OUTPATIENT
Start: 2024-08-20 | End: 2024-08-21 | Stop reason: HOSPADM

## 2024-08-20 RX ORDER — SODIUM CHLORIDE 9 MG/ML
40 INJECTION, SOLUTION INTRAVENOUS AS NEEDED
Status: DISCONTINUED | OUTPATIENT
Start: 2024-08-20 | End: 2024-08-21 | Stop reason: HOSPADM

## 2024-08-20 RX ORDER — AMLODIPINE BESYLATE 5 MG/1
5 TABLET ORAL DAILY
Status: DISCONTINUED | OUTPATIENT
Start: 2024-08-20 | End: 2024-08-21 | Stop reason: HOSPADM

## 2024-08-20 RX ORDER — ENOXAPARIN SODIUM 100 MG/ML
40 INJECTION SUBCUTANEOUS DAILY
Status: DISCONTINUED | OUTPATIENT
Start: 2024-08-20 | End: 2024-08-21 | Stop reason: HOSPADM

## 2024-08-20 RX ORDER — LABETALOL HYDROCHLORIDE 5 MG/ML
10 INJECTION, SOLUTION INTRAVENOUS
Status: DISCONTINUED | OUTPATIENT
Start: 2024-08-20 | End: 2024-08-21 | Stop reason: HOSPADM

## 2024-08-20 RX ADMIN — PANTOPRAZOLE SODIUM 40 MG: 40 TABLET, DELAYED RELEASE ORAL at 09:52

## 2024-08-20 RX ADMIN — IOPAMIDOL 100 ML: 755 INJECTION, SOLUTION INTRAVENOUS at 00:27

## 2024-08-20 RX ADMIN — INSULIN LISPRO 2 UNITS: 100 INJECTION, SOLUTION INTRAVENOUS; SUBCUTANEOUS at 12:50

## 2024-08-20 RX ADMIN — PERFLUTREN 6.52 MG: 6.52 INJECTION, SUSPENSION INTRAVENOUS at 14:22

## 2024-08-20 RX ADMIN — Medication 10 ML: at 09:54

## 2024-08-20 RX ADMIN — INSULIN LISPRO 2 UNITS: 100 INJECTION, SOLUTION INTRAVENOUS; SUBCUTANEOUS at 17:53

## 2024-08-20 RX ADMIN — ONDANSETRON 4 MG: 2 INJECTION INTRAMUSCULAR; INTRAVENOUS at 02:46

## 2024-08-20 RX ADMIN — LOSARTAN POTASSIUM 50 MG: 50 TABLET, FILM COATED ORAL at 09:53

## 2024-08-20 RX ADMIN — AMLODIPINE BESYLATE 5 MG: 5 TABLET ORAL at 09:53

## 2024-08-20 RX ADMIN — INSULIN LISPRO 3 UNITS: 100 INJECTION, SOLUTION INTRAVENOUS; SUBCUTANEOUS at 20:14

## 2024-08-20 RX ADMIN — SODIUM CHLORIDE 1000 MG: 900 INJECTION INTRAVENOUS at 12:51

## 2024-08-20 RX ADMIN — Medication 10 ML: at 20:29

## 2024-08-20 RX ADMIN — SODIUM CHLORIDE 100 ML/HR: 9 INJECTION, SOLUTION INTRAVENOUS at 04:19

## 2024-08-20 RX ADMIN — ENOXAPARIN SODIUM 40 MG: 100 INJECTION SUBCUTANEOUS at 10:20

## 2024-08-20 RX ADMIN — ASPIRIN 81 MG: 81 TABLET, COATED ORAL at 09:53

## 2024-08-20 RX ADMIN — SODIUM CHLORIDE 100 ML/HR: 9 INJECTION, SOLUTION INTRAVENOUS at 15:08

## 2024-08-20 RX ADMIN — ATORVASTATIN CALCIUM 40 MG: 40 TABLET ORAL at 09:53

## 2024-08-20 NOTE — PLAN OF CARE
Goal Outcome Evaluation:  Plan of Care Reviewed With: patient, spouse        Progress: no change  Outcome Evaluation: OT evaluation completed. Pt is A&Ox4. Pt is agreeable to therapy. Pt with delayed processing and decreased insight into deficits. Pt reports chronic back pain 5/10 pre and post tx. Pt reports right hip pain 9/10 with activity. Pt requires verbal cues and additional time for all tasks. Pt was SBA for bed mobility. Pt was CGA-supervision for sit to stand t/f. Pt was supervision with verbal cues for doffing/donning socks. BUE FM and GM coordination minimally impaired. Pt reports RUE feels uncoordinated, but LUE was noted to have minimal increased difficulty. Pt with minimal decreased command following. Skilled OT recommended to address adls, functional mobility and endurance. Recommended d/c home with assist and HH v. OP OT.      Anticipated Discharge Disposition (OT): home with assist, home with home health, home with outpatient therapy services

## 2024-08-20 NOTE — THERAPY EVALUATION
Patient Name: Ghulam Artis  : 1955    MRN: [id number]                              Today's Date: 2024       Admit Date: 2024    Visit Dx:     ICD-10-CM ICD-9-CM   1. Altered mental status, unspecified altered mental status type  R41.82 780.97   2. Dysphagia, unspecified type  R13.10 787.20   3. Impaired mobility [Z74.09]  Z74.09 799.89     Patient Active Problem List   Diagnosis    Hx of adenomatous colonic polyps    Hemoptysis    Restrictive lung disease    MACRINA (obstructive sleep apnea)    Cigarette nicotine dependence without complication    Overweight    Bradycardia    Balance disorder    Right internal carotid occlusion    Primary hypertension     Past Medical History:   Diagnosis Date    GERD (gastroesophageal reflux disease)     Hyperlipidemia     Hypertension     Sleep apnea     cpap at hs     Past Surgical History:   Procedure Laterality Date    CATARACT EXTRACTION Bilateral     COLONOSCOPY N/A 2018    6 mm hyperplastic polyp was found at 20 cm proximal to the anus, tics sigmoid colon    COLONOSCOPY N/A 2024    Procedure: COLONOSCOPY WITH ANESTHESIA;  Surgeon: Zack Moser MD;  Location: [facility] ENDOSCOPY;  Service: Gastroenterology;  Laterality: N/A;  Pre: Hx of adenomatous colonic polyps;  Post: Diverticulosis, Polyps;  Daren Castillo MD    COLONOSCOPY W/ POLYPECTOMY  2013    TUBULAR ADENOMA POLYP AT 60 CM, HYPERPLASTIC POLYP AT 30 CM, DIVERTICULOSIS SIGMOID COLON, RECALL 5YRS    COLONOSCOPY W/ POLYPECTOMY  2008    HYPERPLASTIC POLYPS RECTUM, SCATTERED DIVERTICULA    ENDOSCOPY N/A 2024    Procedure: ESOPHAGOGASTRODUODENOSCOPY WITH ANESTHESIA;  Surgeon: Zack Moser MD;  Location: [facility] ENDOSCOPY;  Service: Gastroenterology;  Laterality: N/A;  Pre: Screen;  Post: Normal;  Daren Castillo MD      General Information       Row Name 24 1409          OT Time and Intention    Document Type evaluation  Pt admitted with AMS and chest pain.  Dx: balance disorder, right internal carotid occlusin, HTN and MACRINA.  -MM     Mode of Treatment occupational therapy  -MM       Row Name 08/20/24 1409          General Information    Patient Profile Reviewed yes  -MM     Prior Level of Function independent:;all household mobility;community mobility;ADL's  -MM     Existing Precautions/Restrictions fall  -MM     Barriers to Rehab medically complex;previous functional deficit  -MM       Row Name 08/20/24 1409          Living Environment    People in Home spouse  walk in shower, shower chair, grab bars, raised toilet with grab bars,  -MM       Row Name 08/20/24 1409          Home Main Entrance    Number of Stairs, Main Entrance none  -MM       Row Name 08/20/24 1409          Stairs Within Home, Primary    Number of Stairs, Within Home, Primary none  -MM       Row Name 08/20/24 1409          Cognition    Orientation Status (Cognition) oriented to;oriented x 4  -MM       Row Name 08/20/24 1409          Safety Issues, Functional Mobility    Safety Issues Affecting Function (Mobility) ability to follow commands;at risk behavior observed;awareness of need for assistance;insight into deficits/self-awareness;problem-solving;safety precaution awareness;safety precautions follow-through/compliance;sequencing abilities  decreased processing  -MM     Impairments Affecting Function (Mobility) balance;endurance/activity tolerance;shortness of breath;pain;sensation/sensory awareness;cognition  -MM     Cognitive Impairments, Mobility Safety/Performance attention;awareness, need for assistance;insight into deficits/self-awareness;problem-solving/reasoning;safety precaution awareness;safety precaution follow-through;sequencing abilities  -MM               User Key  (r) = Recorded By, (t) = Taken By, (c) = Cosigned By      Initials Name Provider Type    MM Jaylen Ng, OTR/L Occupational Therapist                     Mobility/ADL's       Row Name 08/20/24 1409          Bed Mobility     Bed Mobility rolling left;supine-sit  -MM     Rolling Left [place] (Bed Mobility) standby assist;verbal cues  -MM     Supine-Sit [place] (Bed Mobility) standby assist;verbal cues  -MM     Assistive Device (Bed Mobility) head of bed elevated;bed rails  -MM       Row Name 08/20/24 1409          Transfers    Transfers sit-stand transfer;stand-sit transfer  -MM       Row Name 08/20/24 1409          Sit-Stand Transfer    Sit-Stand [place] (Transfers) contact guard;verbal cues  -MM       Row Name 08/20/24 1409          Stand-Sit Transfer    Stand-Sit [place] (Transfers) supervision;verbal cues  -MM       Row Name 08/20/24 1409          Functional Mobility    Functional Mobility- Ind. Level contact guard assist;supervision required;verbal cues required  -MM     Functional Mobility- Device other (see comments)  HHA x1  -MM     Functional Mobility- Comment Pt walked bed to lundberg to chair. Pt reports left lateral lean.  -MM       Row Name 08/20/24 1409          Activities of Daily Living    BADL Assessment/Intervention lower body dressing  -MM       Row Name 08/20/24 1409          Lower Body Dressing Assessment/Training    [place]ade Level (Lower Body Dressing) don;doff;socks;supervision;verbal cues  -MM     Position (Lower Body Dressing) edge of bed sitting  -MM               User Key  (r) = Recorded By, (t) = Taken By, (c) = Cosigned By      Initials Name Provider Type    MM Jaylen Ng, OTR/L Occupational Therapist                   Obj/Interventions       Row Name 08/20/24 1409          Sensory Assessment (Somatosensory)    Sensory Assessment (Somatosensory) UE sensation intact  -MM       Row Name 08/20/24 1409          Vision Assessment/Intervention    Visual Impairment/Limitations WFL  per pt  -MM       Row Name 08/20/24 1409          Range of Motion Comprehensive    General Range of Motion bilateral upper extremity ROM WNL  -MM       Row Name 08/20/24 1409          Strength Comprehensive  (MMT)    Comment, General Manual Muscle Testing (MMT) Assessment BUE 4/5.  -MM       Row Name 08/20/24 1409          Motor Skills    Motor Skills coordination  -MM     Coordination bilateral;upper extremity;minimal impairment  pt reports R side feels worse, but left side was mildly worse.  -MM       Row Name 08/20/24 1409          Balance    Balance Assessment sitting static balance;sitting dynamic balance;standing static balance;standing dynamic balance  -MM     Static Sitting Balance standby assist  -MM     Dynamic Sitting Balance standby assist  -MM     Position, Sitting Balance unsupported;sitting edge of bed  -MM     Static Standing Balance supervision;contact guard  -MM     Dynamic Standing Balance contact guard;verbal cues  -MM     Position/Device Used, Standing Balance supported;other (see comments)  HHA x1  -MM               User Key  (r) = Recorded By, (t) = Taken By, (c) = Cosigned By      Initials Name Provider Type    MM Jaylen Ng, OTR/L Occupational Therapist                   Goals/Plan       Row Name 08/20/24 1409          Transfer Goal 1 (OT)    Activity/Assistive Device (Transfer Goal 1, OT) toilet;bed-to-chair/chair-to-bed;shower chair  -MM     [place] Level/Cues Needed (Transfer Goal 1, OT) independent  -MM     Time Frame (Transfer Goal 1, OT) long term goal (LTG);by discharge  -MM     Progress/Outcome (Transfer Goal 1, OT) new goal  -MM       Row Name 08/20/24 1409          Bathing Goal 1 (OT)    Activity/Device (Bathing Goal 1, OT) bathing skills, all;shower chair  -MM     [place] Level/Cues Needed (Bathing Goal 1, OT) independent  -MM     Time Frame (Bathing Goal 1, OT) long term goal (LTG);by discharge  -MM     Progress/Outcomes (Bathing Goal 1, OT) new goal  -MM       Row Name 08/20/24 1409          Dressing Goal 1 (OT)    Activity/Device (Dressing Goal 1, OT) dressing skills, all  -MM     [place]/Cues Needed (Dressing Goal 1, OT) independent  -MM     Time Frame  (Dressing Goal 1, OT) long term goal (LTG);by discharge  -MM     Progress/Outcome (Dressing Goal 1, OT) new goal  -MM       Row Name 08/20/24 1401          Problem Specific Goal 1 (OT)    Problem Specific Goal 1 (OT) Pt will independently implement one pain management technique to decrease pain and improve functional performance.  -MM     Time Frame (Problem Specific Goal 1, OT) long term goal (LTG);by discharge  -MM     Progress/Outcome (Problem Specific Goal 1, OT) new goal  -MM       Row Name 08/20/24 1407          Therapy Assessment/Plan (OT)    Planned Therapy Interventions (OT) activity tolerance training;BADL retraining;functional balance retraining;occupation/activity based interventions;patient/caregiver education/training;ROM/therapeutic exercise;strengthening exercise;transfer/mobility retraining;adaptive equipment training;cognitive/visual perception retraining;neuromuscular control/coordination retraining  -MM               User Key  (r) = Recorded By, (t) = Taken By, (c) = Cosigned By      Initials Name Provider Type    MM Jaylen Ng, OTR/L Occupational Therapist                   Clinical Impression       Row Name 08/20/24 1404          Pain Assessment    Pretreatment Pain Rating 5/10  Pt reports right hip pain 9/10 with activity.  -MM     Posttreatment Pain Rating 5/10  -MM     Pain Location lower  -MM     Pain Location - back  -MM     Pain Intervention(s) Medication (See MAR);Repositioned;Ambulation/increased activity  -MM       Row Name 08/20/24 4775          Plan of Care Review    Plan of Care Reviewed With patient;spouse  -MM     Progress no change  -MM     Outcome Evaluation OT evaluation completed. Pt is A&Ox4. Pt is agreeable to therapy. Pt with delayed processing and decreased insight into deficits. Pt reports chronic back pain 5/10 pre and post tx. Pt reports right hip pain 9/10 with activity. Pt requires verbal cues and additional time for all tasks. Pt was SBA for bed mobility. Pt  was CGA-supervision for sit to stand t/f. Pt was supervision with verbal cues for doffing/donning socks. BUE FM and GM coordination minimally impaired. Pt reports RUE feels uncoordinated, but LUE was noted to have minimal increased difficulty. Pt with minimal decreased command following. Skilled OT recommended to address adls, functional mobility and endurance. Recommended d/c home with assist and HH v. OP OT.  -MM       Row Name 08/20/24 1262          Therapy Assessment/Plan (OT)    Patient/Family Therapy Goal Statement (OT) to go home  -MM     Rehab Potential (OT) good, to achieve stated therapy goals  -MM     Criteria for Skilled Therapeutic Interventions Met (OT) yes;meets criteria;skilled treatment is necessary  -MM     Therapy Frequency (OT) 5 times/wk  -MM     Predicted Duration of Therapy Intervention (OT) until hospital discharge  -MM       Row Name 08/20/24 6086          Therapy Plan Review/Discharge Plan (OT)    Anticipated Discharge Disposition (OT) home with assist;home with home health;home with outpatient therapy services  -MM       Row Name 08/20/24 140          Vital Signs    Pretreatment Heart Rate (beats/min) 84  -MM     Pre SpO2 (%) 95  -MM     O2 Delivery Pre Treatment room air  -MM     O2 Delivery Intra Treatment room air  -MM     Post SpO2 (%) 96  -MM     O2 Delivery Post Treatment room air  -MM     Pre Patient Position Supine  -MM     Intra Patient Position Standing  -MM     Post Patient Position Sitting  -MM       Row Name 08/20/24 6768          Positioning and Restraints    Pre-Treatment Position in bed  -MM     Post Treatment Position chair  -MM     In Chair sitting;call light within reach;encouraged to call for assist;with family/caregiver  -MM               User Key  (r) = Recorded By, (t) = Taken By, (c) = Cosigned By      Initials Name Provider Type    MM Jaylen Ng, OTR/L Occupational Therapist                   Outcome Measures       Row Name 08/20/24 3739          How much  help from another is currently needed...    Putting on and taking off regular lower body clothing? 3  -MM     Bathing (including washing, rinsing, and drying) 3  -MM     Toileting (which includes using toilet bed pan or urinal) 3  -MM     Putting on and taking off regular upper body clothing 4  -MM     Taking care of personal grooming (such as brushing teeth) 4  -MM     Eating meals 4  -MM     AM-PAC 6 Clicks Score (OT) 21  -MM       Row Name 08/20/24 1408 08/20/24 0953       How much help from another person do you currently need...    Turning from your back to your side while in flat bed without using bedrails? 4  -AJ 3  -SM    Moving from lying on back to sitting on the side of a flat bed without bedrails? 4  -AJ 3  -SM    Moving to and from a bed to a chair (including a wheelchair)? 3  -AJ 3  -SM    Standing up from a chair using your arms (e.g., wheelchair, bedside chair)? 4  -AJ 3  -SM    Climbing 3-5 steps with a railing? 2  -AJ 1  -SM    To walk in hospital room? 3  -AJ --    AM-PAC 6 Clicks Score (PT) 20  -AJ --    Highest Level of Mobility Goal 6 --> Walk 10 steps or more  -AJ --      Row Name 08/20/24 1409 08/20/24 1408       Functional Assessment    Outcome Measure Options AM-PAC 6 Clicks Daily Activity (OT)  -MM AM-PAC 6 Clicks Basic Mobility (PT)  -AJ              User Key  (r) = Recorded By, (t) = Taken By, (c) = Cosigned By      Initials Name Provider Type    MM Jaylen Ng, OTR/L Occupational Therapist    Kishore Mckeon, PT DPT Physical Therapist    Binta Peterson, RN Registered Nurse                    Occupational Therapy Education       Title: PT OT SLP Therapies (In Progress)       Topic: Occupational Therapy (In Progress)       Point: ADL training (Done)       Description:   Instruct learner(s) on proper safety adaptation and remediation techniques during self care or transfers.   Instruct in proper use of assistive devices.                  Learning Progress Summary              Patient Acceptance, E, VU by MM at 8/20/2024 1544   Family Acceptance, E, VU by MM at 8/20/2024 1544                         Point: Home exercise program (Not Started)       Description:   Instruct learner(s) on appropriate technique for monitoring, assisting and/or progressing therapeutic exercises/activities.                  Learner Progress:  Not documented in this visit.              Point: Precautions (Done)       Description:   Instruct learner(s) on prescribed precautions during self-care and functional transfers.                  Learning Progress Summary             Patient Acceptance, E, VU by MM at 8/20/2024 1544   Family Acceptance, E, VU by MM at 8/20/2024 1544                         Point: Body mechanics (Done)       Description:   Instruct learner(s) on proper positioning and spine alignment during self-care, functional mobility activities and/or exercises.                  Learning Progress Summary             Patient Acceptance, E, VU by MM at 8/20/2024 1544   Family Acceptance, E, VU by MM at 8/20/2024 1544                                         User Key       Initials Effective Dates Name Provider Type Discipline     07/11/23 -  Jaylen Ng, OTR/L Occupational Therapist OT                  OT Recommendation and Plan  Planned Therapy Interventions (OT): activity tolerance training, BADL retraining, functional balance retraining, occupation/activity based interventions, patient/caregiver education/training, ROM/therapeutic exercise, strengthening exercise, transfer/mobility retraining, adaptive equipment training, cognitive/visual perception retraining, neuromuscular control/coordination retraining  Therapy Frequency (OT): 5 times/wk  Plan of Care Review  Plan of Care Reviewed With: patient, spouse  Progress: no change  Outcome Evaluation: OT evaluation completed. Pt is A&Ox4. Pt is agreeable to therapy. Pt with delayed processing and decreased insight into deficits. Pt reports chronic  back pain 5/10 pre and post tx. Pt reports right hip pain 9/10 with activity. Pt requires verbal cues and additional time for all tasks. Pt was SBA for bed mobility. Pt was CGA-supervision for sit to stand t/f. Pt was supervision with verbal cues for doffing/donning socks. BUE FM and GM coordination minimally impaired. Pt reports RUE feels uncoordinated, but LUE was noted to have minimal increased difficulty. Pt with minimal decreased command following. Skilled OT recommended to address adls, functional mobility and endurance. Recommended d/c home with assist and HH v. OP OT.     Time Calculation:         Time Calculation- OT       Row Name 08/20/24 1409             Time Calculation- OT    OT Start Time 1409  -MM      OT Stop Time 1449  -MM      OT Time Calculation (min) 40 min  -MM      OT Received On 08/20/24  -MM      OT Goal Re-Cert Due Date 08/30/24  -MM                User Key  (r) = Recorded By, (t) = Taken By, (c) = Cosigned By      Initials Name Provider Type    MM Jaylen Ng, OTR/L Occupational Therapist                  Therapy Charges for Today       Code Description Service Date Service Provider Modifiers Qty    [id number] HC OT EVAL LOW COMPLEXITY 3 8/20/2024 Jaylen Ng OTR/L GO 1                 NILESH Ponce/TREY  8/20/2024

## 2024-08-20 NOTE — PLAN OF CARE
Goal Outcome Evaluation:  Plan of Care Reviewed With: patient        Progress: improving     Pt did not have any complaints of pain during shift. Pt had an EEG, Echo, and MRI done during shift. VSS. Safety maintained during shift. Call light within reach. No complaints at this time.

## 2024-08-20 NOTE — ED NOTES
CODE STROKE CALLED AT THIS TIME. PATIENT TAKEN TO CT SCAN WITH MONITOR ON STRETCHER WITH LIANG HIGH

## 2024-08-20 NOTE — CONSULTS
"  Neurology Consult Note    Consult Date: 2024  Referring MD: No ref. provider found  Reason for Consult: Feeling atypical    Patient: Ghulam Artis (68 y.o. male)  MRN: [id number]  : 1955    History of Present Illness:   Ghulam Artis is a 68 y.o. male who is a good historian.  His wife is at the bedside and assist in the history as well.  He tells me that for the last month when he wakes up he feels all right.  He cannot describe this further.  As the day goes on he feels better.  His wife does report that he snores when he sleeps.  He has a history of obstructive sleep apnea but is noncompliant with his CPAP mask saying that is strangled him.  Yesterday morning they took their dog to the groomer.  This was around 10 AM.  At noon they came home and he took a nap.  He woke up at 6 AM.  He once again felt atypical.  He did have incontinence.  He went to the bathroom.  He still was somewhat \"out of it\" from his wife's perspective.  No seizure activity was seen.  He had no unilateral weakness or numbness.  His wife is a retired nurse and she confirms that he had no stroke symptoms including no bulbar findings as well.  He always has a slow gait but may have had some worsening shuffling gait recently.  He has no previous history of significant head trauma.  He has no history of seizures.  He has no recent medication changes.    He follows with Dr. Lopez and [facility] neurology.  He is treated for amnestic mild cognitive impairment.  He had neuropsych testing which was unremarkable.  He is also had an MRI previously which showed small vessel disease but no other abnormal findings.  His normal B12 and TSH levels well.  He has been tried on Aricept and Namenda in the past.    Medical History:   Past Medical/Surgical Hx:  Past Medical History:   Diagnosis Date    GERD (gastroesophageal reflux disease)     Hyperlipidemia     Hypertension     Sleep apnea     cpap at      Past Surgical History: "   Procedure Laterality Date    CATARACT EXTRACTION Bilateral     COLONOSCOPY N/A 09/24/2018    6 mm hyperplastic polyp was found at 20 cm proximal to the anus, tics sigmoid colon    COLONOSCOPY N/A 2/21/2024    Procedure: COLONOSCOPY WITH ANESTHESIA;  Surgeon: Zack Moser MD;  Location: [facility] ENDOSCOPY;  Service: Gastroenterology;  Laterality: N/A;  Pre: Hx of adenomatous colonic polyps;  Post: Diverticulosis, Polyps;  Daren Castillo MD    COLONOSCOPY W/ POLYPECTOMY  05/13/2013    TUBULAR ADENOMA POLYP AT 60 CM, HYPERPLASTIC POLYP AT 30 CM, DIVERTICULOSIS SIGMOID COLON, RECALL 5YRS    COLONOSCOPY W/ POLYPECTOMY  02/26/2008    HYPERPLASTIC POLYPS RECTUM, SCATTERED DIVERTICULA    ENDOSCOPY N/A 2/21/2024    Procedure: ESOPHAGOGASTRODUODENOSCOPY WITH ANESTHESIA;  Surgeon: Zack Moser MD;  Location: [facility] ENDOSCOPY;  Service: Gastroenterology;  Laterality: N/A;  Pre: Screen;  Post: Normal;  Daren Castillo MD       Medications On Admission:  Medications Prior to Admission   Medication Sig Dispense Refill Last Dose    amLODIPine (NORVASC) 5 MG tablet Take 1 tablet by mouth Daily.       aspirin 81 MG EC tablet Take 1 tablet by mouth Daily.       Azelastine HCl 137 MCG/SPRAY solution        fluticasone (FLONASE) 50 MCG/ACT nasal spray        glimepiride (AMARYL) 4 MG tablet Take 1 tablet by mouth Every Morning Before Breakfast.       HYDROcodone-acetaminophen (NORCO) 5-325 MG per tablet Take 1 tablet by mouth Every 6 (Six) Hours As Needed.       Mounjaro 2.5 MG/0.5ML solution pen-injector Inject 2.5 mL under the skin into the appropriate area as directed 1 (One) Time Per Week.       olmesartan (BENICAR) 20 MG tablet Take 1 tablet by mouth Daily.       pantoprazole (PROTONIX) 40 MG EC tablet Take 1 tablet by mouth Daily. 30 tablet 11     polyethylene glycol (GoLYTELY) 236 g solution Take as directed by office instructions. 4000 mL 0     simvastatin (ZOCOR) 20 MG tablet Take 1 tablet by mouth Every  Night.          Current Medications:    Current Facility-Administered Medications:     acetaminophen (TYLENOL) tablet 650 mg, 650 mg, Oral, Q4H PRN **OR** acetaminophen (TYLENOL) suppository 650 mg, 650 mg, Rectal, Q4H PRN, Rodri Francis MD    amLODIPine (NORVASC) tablet 5 mg, 5 mg, Oral, Daily, Rodri Francis MD, 5 mg at 08/20/24 0953    aspirin EC tablet 81 mg, 81 mg, Oral, Daily, Rodri Francis MD, 81 mg at 08/20/24 0953    atorvastatin (LIPITOR) tablet 40 mg, 40 mg, Oral, Daily, Rodri Francis MD, 40 mg at 08/20/24 0953    dextrose (D50W) (25 g/50 mL) IV injection 25 g, 25 g, Intravenous, Q15 Min PRN, Rodri Francis MD    dextrose (GLUTOSE) oral gel 15 g, 15 g, Oral, Q15 Min PRN, Rodri Francis MD    Enoxaparin Sodium (LOVENOX) syringe 40 mg, 40 mg, Subcutaneous, Daily, Rodri Francis MD, 40 mg at 08/20/24 1020    glucagon (GLUCAGEN) injection 1 mg, 1 mg, Intramuscular, Q15 Min PRN, Rodri Francis MD    Insulin Lispro (humaLOG) injection 2-7 Units, 2-7 Units, Subcutaneous, 4x Daily AC & at Bedtime, Rodri Francis MD    labetalol (NORMODYNE,TRANDATE) injection 10 mg, 10 mg, Intravenous, Q10 Min PRN, Rodri Francis MD    losartan (COZAAR) tablet 50 mg, 50 mg, Oral, Daily, Rodri Francis MD, 50 mg at 08/20/24 0953    ondansetron (ZOFRAN) injection 4 mg, 4 mg, Intravenous, Q6H PRN, Rodri Francis MD, 4 mg at 08/20/24 0246    pantoprazole (PROTONIX) EC tablet 40 mg, 40 mg, Oral, Daily, Rodri Francis MD, 40 mg at 08/20/24 0952    [COMPLETED] Insert Peripheral IV, , , Once **AND** sodium chloride 0.9 % flush 10 mL, 10 mL, Intravenous, PRN, Rodri Francis MD    sodium chloride 0.9 % flush 10 mL, 10 mL, Intravenous, Q12H, Rodri Francis MD, 10 mL at 08/20/24 0954    sodium chloride 0.9 % flush 10 mL, 10 mL, Intravenous, PRN, Rodri Francis MD    sodium chloride  "0.9 % infusion 40 mL, 40 mL, Intravenous, PRN, Rodri Francis MD    sodium chloride 0.9 % infusion, 100 mL/hr, Intravenous, Continuous, Rodri Francis MD, Last Rate: 100 mL/hr at 08/20/24 0419, 100 mL/hr at 08/20/24 0419     Allergies:  No Known Allergies    Social Hx:  Social History     Socioeconomic History    Marital status:    Tobacco Use    Smoking status: Former     Average packs/day: 2.0 packs/day for 50.0 years (100.0 ttl pk-yrs)     Types: Cigarettes     Start date: 1971    Smokeless tobacco: Never   Vaping Use    Vaping status: Never Used   Substance and Sexual Activity    Alcohol use: Yes     Comment: Occasional    Drug use: No    Sexual activity: Defer       Family Hx:  Family History   Problem Relation Age of Onset    Heart disease Maternal Grandfather     Heart attack Maternal Grandfather     Colon cancer Neg Hx     GI problems Neg Hx     Colon polyps Neg Hx      Physical Examination:   Vital Signs:  Vitals:    08/20/24 0127 08/20/24 0140 08/20/24 0332 08/20/24 0733   BP:  149/81 150/68 151/68   BP Location:  Right arm Right arm Right arm   Patient Position:  Lying Lying Lying   Pulse: 98 97 94 87   Resp: 16 22 20 18   Temp: 99.7 °F (37.6 °C) 98.7 °F (37.1 °C) 98.1 °F (36.7 °C) 98.5 °F (36.9 °C)   TempSrc: Oral Oral Oral Oral   SpO2: 94% 95% 94% 93%   Weight:   89.3 kg (196 lb 12.8 oz)    Height:   167.6 cm (65.98\")        General Exam:  Head:  Normocephalic, atraumatic  HEENT:  Neck supple  Fundoscopic Exam:  No signs of disc edema  CVS:  Regular rate and rhythm.  No murmurs  Carotid Examination:  No bruits  Lungs:  Clear to auscultation  Abdomen:  Nontender, Nondistended  Extremities:  No signs of peripheral edema  Skin:  No rashes    Neurologic Exam:    Mental Status:    -Awake, Alert, Oriented X 3  -No word finding difficulties  -No aphasia  -No dysarthria  -Follows simple and complex commands    CN II:  Visual fields full.  Pupils equally reactive to light  CN III, IV, " VI:  Extraocular Muscles full with no signs of nystagmus  CN V:  Facial sensory is symmetric with no asymmetries.  CN VII:  Facial motor symmetric  CN VIII:  Gross hearing intact bilaterally  CN IX:  Palate elevates symmetrically  CN X:  Palate elevates symmetrically  CN XI:  Shoulder shrug symmetric  CN XII:  Tongue is midline on protrusion    Motor: (strength out of 5:  1= minimal movement, 2 = movement in plane of gravity, 3 = movement against gravity, 4 = movement against some resistance, 5 = full strength)    -Right Upper Ext: Proximal: 5 Distal: 5  -Left Upper Ext: Proximal: 5 Distal: 5    -Right Lower Ext: Proximal: 5 Distal: 5  -Left Lower Ext: Proximal: 5 Distal: 5    DTR:  -Right   Biceps: 2+ Triceps: 2+ Brachioradialis: 2+   Patella: 2+ Ankle: 2+ Neg Babinski  -Left   Biceps: 2+ Triceps: 2+ Brachioradialis: 2+   Patella: 2+ Ankle: 2+ Neg Babinski    Sensory:  -Intact to light touch, pinprick, temperature, pain, and proprioception    Coordination:  -Finger to nose intact  -Heel to shin intact  -No ataxia    Gait  -No signs of ataxia  -ambulates unassisted    Recent Diagnostics:   Laboratory Results:   - Reviewed in EMR  Lab Results   Component Value Date    GLUCOSE 172 (H) 08/20/2024    CALCIUM 9.1 08/20/2024     08/20/2024    K 3.8 08/20/2024    CO2 28.0 08/20/2024     08/20/2024    BUN 8 08/20/2024    CREATININE 0.82 08/20/2024    EGFRIFNONA 79 09/16/2019    BCR 9.8 08/20/2024    ANIONGAP 11.0 08/20/2024     Lab Results   Component Value Date    WBC 12.84 (H) 08/20/2024    HGB 13.6 08/20/2024    HCT 41.5 08/20/2024    MCV 92.0 08/20/2024     08/20/2024     Lab Results   Component Value Date    PTT 31.5 08/20/2024    INR 1.12 (H) 08/20/2024     Lab Results   Component Value Date    TRIG 108 08/20/2024    HDL 35 (L) 08/20/2024    LDL 65 08/20/2024     Lab Results   Component Value Date    HGBA1C 6.20 (H) 08/20/2024       Imaging Results:  Imaging Results (Last 24 Hours)       Procedure  Component Value Units Date/Time    CT Angiogram Neck [[id number]] Collected: 08/20/24 0703     Updated: 08/20/24 0739    Narrative:      EXAMINATION: CT ANGIOGRAM NECK-      8/19/2024 11:19 PM     HISTORY: stroke; R41.82-Altered mental status, unspecified     In order to have a CT radiation dose as low as reasonably achievable  Automated Exposure Control was utilized for adjustment of the mA and/or  KV according to patient size.     Total DLP = 382.25 mGy.cm     CT angiography of the neck is performed after intravenous contrast  enhancement.     Images are acquired in axial plane and subsequent reconstruction in  coronal and sagittal planes.     There is no previous similar study for comparison.     Atheromatous changes of the limited visualized thoracic aortic arch. No  aneurysmal dilatation.     There is a common origin of the brachiocephalic and left common carotid  artery from the aortic arch. It divides into normal appearing  brachiocephalic trunk and the left common carotid artery.     There is normal origin course and caliber of the left subclavian artery.     The left vertebral artery arises directly from the aortic arch adjacent  and proximal to the origin of the left subclavian artery.     Both common carotid arteries have a normal course and caliber throughout  the neck.     There is complete occlusion of the right internal carotid artery at the  level of origin/bifurcation of the right distal common carotid artery.  No reopacification/reconstitution of the right internal carotid artery  in the neck.     There is a large calcific plaque in the left distal common carotid  artery. No significant stenosis. It subsequently divides into a  normal-appearing left internal carotid artery and significantly stenotic  origin of the left external carotid artery. There is normal course of  the left internal carotid artery in the neck.     A dominant left vertebral artery arises directly from the aortic  arch.  Relatively smaller right vertebral artery arises from the right  subclavian artery. There is subsequent normal course throughout the  neck. No areas of focal stenosis or aneurysmal dilatation. Relatively  small right and a dominant left vertebral artery enter the foramen  magnum. The subsequent courses reviewed and reported in CT angiography  of the head.     Limited visualized soft tissues of the neck are unremarkable.     There is elongation of the styloid processes bilaterally due to partial  calcification of the stylohyoid ligaments bilaterally. No significant  compression on the adjacent collateral vessels.     The limited visualized lungs are unremarkable.     Images reviewed in bone windows show significant cervical spondylosis  more pronounced in the mid to lower cervical spine.       Impression:      1. Complete occlusion of the right internal carotid artery at the  origin/common carotid bifurcation. No reconstitution in the neck.  2. Atheromatous changes of the common and internal carotid arteries  bilaterally. No significant stenosis on the left.  3. A dominant left vertebral artery arises directly from the aortic  arch.  4. The NASCET criteria was utilized for estimation of carotid arterial  stenosis.     The above study was initially reviewed and reported by [employer]. I do not  find any discrepancies.                                      This report was signed and finalized on 8/20/2024 7:36 AM by Dr. Pascual Woodson MD.       CT Angiogram Head w AI Analysis of LVO [[id number]] Collected: 08/20/24 0610     Updated: 08/20/24 0722    Narrative:      EXAMINATION: CT ANGIOGRAM HEAD W AI ANALYSIS OF LVO-     8/19/2024 11:19 PM     HISTORY: stroke; R41.82-Altered mental status, unspecified     CT angiography of the head is performed after intravenous contrast  enhancement.     The images are acquired in axial plane with subsequent reconstruction in  coronal and sagittal planes.     There is no  previous similar study for comparison. Correlation made with  CT scan of the head obtained earlier today.     There a faint opacification of the right internal carotid artery in the  distal part of the right carotid canal. Poor opacification of the  heavily calcified, atheromatous, right internal carotid artery and the  right cavernous sinus. There is opacification of the normal size  suprasellar right internal carotid artery with a subsequent bifurcation  into anterior and middle cerebral arteries. There are normal insular,  opercular and cortical branches of the right middle cerebral artery.     Heavily atheromatous left internal carotid artery is seen in the left  left cavernous sinus. No significant stenosis. Normal size left  suprasellar internal carotid artery is seen dividing into  normal-appearing anterior and middle cerebral arteries. There is  subsequent normal insular, opercular and cortical branches of the left  middle cerebral artery.     A relatively small right and a dominant left vertebral artery enter the  foramen magnum and subsequently joint to make a normal size basilar  artery. The basilar artery continues as the right PCA. The P1 segment of  the left PCA is absent. The P2 and P3 segments of the left PCA are made  by a dominant left posterior communicating artery. There normal  branches. No areas of focal stenosis or aneurysmal dilatation.       Impression:      1. The heavily atheromatous internal carotid arteries in the carotid  canal and cavernous sinus bilaterally.  2. Poor reopacification of the distal right internal carotid artery in  the distal carotid canal and right cavernous sinus from the left  internal carotid artery through the anterior communicating branch. The  remaining intracranial circulation is unremarkable without any area of  flow-limiting stenosis or aneurysmal dilatation.  3. A fetal-type left posterior circulation.  4. The NASCET criteria were utilized for estimation of  carotid arterial  stenosis.     The above study was initially reviewed and reported by [facility]. I do not  find any discrepancies.     This report was signed and finalized on 8/20/2024 7:18 AM by Dr. Pascual Woodson MD.       CT CEREBRAL PERFUSION WITH & WITHOUT CONTRAST [[id number]] Collected: 08/20/24 0636     Updated: 08/20/24 0651    Narrative:      EXAMINATION: CT CEREBRAL PERFUSION W WO CONTRAST-  8/20/2024 6:37 AM     Indication: Acute ischemic stroke suspected     Exam:      1. Perfusion CT is performed to acquire images tracking the temporal  course of iodinated contrast material passing through the cerebral  circulation. Perfusion parameters, such as cerebral blood flow (CBF),  cerebral blood volume (CBV), mean transit time (MTT), etc. are  calculated by [employer] with additional provided perfusion maps and  estimated stroke volumes.  2. Automated exposure control (AEC) protocols are utilized on the  scanner to ensure dose lowered technique.      Comparison: Noncontrast CT brain and brain angiogram dated 8/19/2024  11:31 PM     Findings:     Abnormal perfusion exam, with size and distribution of the perfusion  abnormality described below.     Distribution: Perfusion abnormality involves the Right MCA and PCA  vascular distribution.     Tmax >6.0 seconds volume: 0 ml     CBF < 30% volume: 0 ml     Mismatch volume: 0 ml      Mismatch ratio: None       Impression:      Impression:  1. Today's exam demonstrates no evidence of a core infarct with no  volume of brain demonstrating a Tmax greater than 6.0 seconds or a CBF  of less than 30%. However, there is noted to be relative hypoperfusion  involving both the right MCA and PCA distributions with a Tmax of  greater than 4.0 seconds in both of these distributions on mapping. This  is substantiated on the rapid angio rendering MIPS with relative  diminished density of contrast within the right MCA distribution as well  as irregularity and diminished density of  enhancement within the right  posterior cerebral artery distribution. Correlation with the CT  angiogram demonstrates that the cervical segment of the right internal  carotid artery is occluded. Flow to the right MCA distribution is  related to reconstitution from the contralateral hemisphere resulting in  a delay in transit time. There is also disease within the P1 segment of  the right posterior cerebral artery with diminished Tmax and a delayed  transit time into the right hemisphere in the PCA distribution.  2. The emergency room was notified of the findings     This report was signed and finalized on 8/20/2024 6:48 AM by Dr. Dharmesh Gruber MD.       XR Chest 1 View [[id number]] Collected: 08/20/24 0625     Updated: 08/20/24 0628    Narrative:      EXAMINATION: XR CHEST 1 VW-  8/20/2024 6:25 AM     HISTORY: Weakness.     FINDINGS: Upright frontal projection of the chest demonstrates no  evidence of acute cardiopulmonary disease. There is no effusion or free  air present.       Impression:      1. No acute disease.     This report was signed and finalized on 8/20/2024 6:25 AM by Dr. Dharmesh Gruber MD.       CT Head Without Contrast Stroke Protocol [[id number]] Collected: 08/20/24 0528     Updated: 08/20/24 0536    Narrative:      EXAMINATION: CT HEAD WO CONTRAST STROKE PROTOCOL-      8/19/2024 11:19 PM     HISTORY: stroke; R41.82-Altered mental status, unspecified     In order to have a CT radiation dose as low as reasonably achievable  Automated Exposure Control was utilized for adjustment of the mA and/or  KV according to patient size.     Total DLP = 834.16 mGy.cm     The CT scan of the head is performed without intravenous contrast  enhancement.     The images are acquired in axial plane and subsequent reconstruction in  coronal and sagittal planes.     The comparison is made with the previous study dated 8/27/2016.     There is no evidence of a mass. There is no midline shift.     There is no evidence of  intracranial hemorrhage or hematoma.     Moderately dilated ventricles, basal cisterns and cortical sulci  suggestive moderate chronic volume loss. This moderately more  progressive since the previous study.     Scattered areas of chronic white matter ischemia bilaterally are noted.  The gray-white matter differentiation is maintained.     Posterior fossa structures as visualized appear unremarkable.     Images reviewed in bone window show no evidence of an acute skull  fracture. Mild mucosal thickening of the ethmoid sinuses are noted.  Mastoid air cells are clear.       Impression:      1. No acute intracranial abnormality.     The above study was initially reviewed and reported by [facility]. I do not  find any discrepancies.                                      This report was signed and finalized on 8/20/2024 5:33 AM by Dr. aPscual Woodson MD.                Other labs:  TSH, T4, hemoglobin A1c, and lipid panel all unremarkable.  B12 pending  Other RAD:  CT angiography shows occlusion of the right internal carotid artery just after bifurcation.  There is also heavy plaque burden in the intracranial arteries.  CT of the head without contrast shows no acute finding      Assessment & Plan:   Patient presenting with no focal neurologic deficits but instead some altered mentation just after awakening.  The fact that he had possible incontinence means that we should rule out epileptic etiologies.  An EEG will be ordered.  I think overall this is most consistent with underlying hypoxia in the setting of a diagnosis of obstructive sleep apnea without treatment.  I believe that the patient would benefit from treatment for his MACRINA.  He may need to consider an Inspire Device given that he does not enjoy his mask.    Impression:  Sleep disorder with altered cognition secondary to likely prolonged hypoxia overnight  Low likelihood of this being an ischemic etiology  Right ICA occlusion which is likely chronic in nature.  No  indication for vascular surgery consultation as this does represent a complete occlusion  Heavy intracranial atherosclerotic plaque requiring antiplatelets and high-dose statin      Plan:   MRI brain  EEG  Highly recommend treatment of obstructive sleep apnea as an outpatient  Continue aspirin 81 mg  Continue Zocor 20 mg with an LDL goal less than 70    Aryan Goddard MD  08/20/24  10:26 CDT    Medical Decision Making    Number/Complexity of Problems  Moderate  1 undiagnosed new problem with uncertain prognosis -   1 acute illness with systemic symptoms -   High  1 acute or chronic illness that poses a threat to life/body function -   High     MDM Data  Moderate - 1/3 categories  Extensive - 2/3 categories    Category 1: 3 of the following  Review of external notes  Review of results  Ordering of each unique test  Independent historian  Category 2:  Independent interpretation of test (ex: imaging)  Category 3:  Discussion of management with another provider    Extensive     Treatment Plan  Moderate - Prescription Drug management  High  Drug therapy requiring intensive monitoring for toxicity  Decision regarding hospitalization or escalation of care  De-escalate care/DNR decisions

## 2024-08-20 NOTE — ED PROVIDER NOTES
Subjective   History of Present Illness  Patient is brought emergency room by his wife with a complaint of sudden onset of altered mental status.  It is hard to pin her down as to the specific time but is best that she completed together she thinks it was around 830 tonight.  She said his mentation seem to be different.  He told her he wanted to get up to go take a nap but then he did not get up.  His odd behavior continued so she brought him here.  She does state he has been under a lot of stress lately because his brother passed away recently.  Patient's mentation seems to be somewhat slow and altered but he tells me that he woke up this morning with chest pain and then just lay around the day and that is all he knows to tell me.    History provided by:  Patient   used: No    Neurologic Problem  The patient's primary symptoms include an altered mental status. This is a new problem. The current episode started today. The neurological problem developed suddenly. The last time the patient was known to be well was 8/20/2024 8:30 PM.  The problem is unchanged. There was right-sided focality noted. Associated symptoms include confusion. Pertinent negatives include no abdominal pain, auditory change, aura, back pain, bladder incontinence, bowel incontinence, chest pain, diaphoresis, dizziness, fatigue, fever, headaches, light-headedness, nausea, neck pain, palpitations, shortness of breath, vertigo or vomiting. Past treatments include nothing. The treatment provided no relief. There is no history of a bleeding disorder, a clotting disorder, a CVA, dementia, head trauma, liver disease, mood changes or seizures.       Review of Systems   Constitutional: Negative.  Negative for diaphoresis, fatigue and fever.   HENT: Negative.     Respiratory: Negative.  Negative for shortness of breath.    Cardiovascular: Negative.  Negative for chest pain and palpitations.   Gastrointestinal: Negative.  Negative for  abdominal pain, bowel incontinence, nausea and vomiting.   Genitourinary: Negative.  Negative for bladder incontinence.   Musculoskeletal: Negative.  Negative for back pain and neck pain.   Skin: Negative.    Neurological: Negative.  Negative for dizziness, vertigo, light-headedness and headaches.   Psychiatric/Behavioral:  Positive for confusion.    All other systems reviewed and are negative.      Past Medical History:   Diagnosis Date    GERD (gastroesophageal reflux disease)     Hyperlipidemia     Hypertension     Sleep apnea     cpap at hs       No Known Allergies    Past Surgical History:   Procedure Laterality Date    CATARACT EXTRACTION Bilateral     COLONOSCOPY N/A 09/24/2018    6 mm hyperplastic polyp was found at 20 cm proximal to the anus, tics sigmoid colon    COLONOSCOPY N/A 2/21/2024    Procedure: COLONOSCOPY WITH ANESTHESIA;  Surgeon: Zack Moser MD;  Location:  [facility];  Service: Gastroenterology;  Laterality: N/A;  Pre: Hx of adenomatous colonic polyps;  Post: Diverticulosis, Polyps;  Daren Castillo MD    COLONOSCOPY W/ POLYPECTOMY  05/13/2013    TUBULAR ADENOMA POLYP AT 60 CM, HYPERPLASTIC POLYP AT 30 CM, DIVERTICULOSIS SIGMOID COLON, RECALL 5YRS    COLONOSCOPY W/ POLYPECTOMY  02/26/2008    HYPERPLASTIC POLYPS RECTUM, SCATTERED DIVERTICULA    ENDOSCOPY N/A 2/21/2024    Procedure: ESOPHAGOGASTRODUODENOSCOPY WITH ANESTHESIA;  Surgeon: Zack Moser MD;  Location:  [facility];  Service: Gastroenterology;  Laterality: N/A;  Pre: Screen;  Post: Normal;  Daren Castillo MD       Family History   Problem Relation Age of Onset    Heart disease Maternal Grandfather     Heart attack Maternal Grandfather     Colon cancer Neg Hx     GI problems Neg Hx     Colon polyps Neg Hx        Social History     Socioeconomic History    Marital status:    Tobacco Use    Smoking status: Former     Average packs/day: 2.0 packs/day for 50.0 years (100.0 ttl pk-yrs)     Types:  Cigarettes     Start date: 1971    Smokeless tobacco: Never   Vaping Use    Vaping status: Never Used   Substance and Sexual Activity    Alcohol use: Yes     Comment: Occasional    Drug use: No    Sexual activity: Defer       Prior to Admission medications    Medication Sig Start Date End Date Taking? Authorizing Provider   amLODIPine (NORVASC) 5 MG tablet Take 1 tablet by mouth Daily.    Lizette Zaragoza MD   aspirin 81 MG EC tablet Take 1 tablet by mouth Daily.    Lizette Zaragoza MD   Azelastine HCl 137 MCG/SPRAY solution  9/23/19   Lizette Zaragoza MD   fluticasone (FLONASE) 50 MCG/ACT nasal spray  9/23/19   Lizette Zaragoza MD   glimepiride (AMARYL) 4 MG tablet Take 1 tablet by mouth Every Morning Before Breakfast. 9/20/23   Lizette Zaragoza MD   HYDROcodone-acetaminophen (NORCO) 5-325 MG per tablet Take 1 tablet by mouth Every 6 (Six) Hours As Needed.    Lizette Zaragoza MD   Mounjaro 2.5 MG/0.5ML solution pen-injector Inject 2.5 mL under the skin into the appropriate area as directed 1 (One) Time Per Week. 11/20/23   Lizette Zaragoza MD   olmesartan (BENICAR) 20 MG tablet Take 1 tablet by mouth Daily.    Lizette Zaragoza MD   pantoprazole (PROTONIX) 40 MG EC tablet Take 1 tablet by mouth Daily. 11/30/23   Maria D Ludwig APRN   polyethylene glycol (GoLYTELY) 236 g solution Take as directed by office instructions. 11/30/23   Maria D Ludwig APRN   simvastatin (ZOCOR) 20 MG tablet Take 1 tablet by mouth Every Night.    Lizette Zaragoza MD       Medications   sodium chloride 0.9 % flush 10 mL (has no administration in time range)   ondansetron (ZOFRAN) injection 4 mg (4 mg Intravenous Given 8/20/24 0246)   amLODIPine (NORVASC) tablet 5 mg (5 mg Oral Given 8/20/24 0953)   aspirin EC tablet 81 mg (81 mg Oral Given 8/20/24 0953)   losartan (COZAAR) tablet 50 mg (50 mg Oral Given 8/20/24 0953)   pantoprazole (PROTONIX) EC tablet 40 mg (40 mg Oral Given 8/20/24  0952)   atorvastatin (LIPITOR) tablet 40 mg (40 mg Oral Given 8/20/24 0953)   sodium chloride 0.9 % flush 10 mL (10 mL Intravenous Given 8/20/24 0954)   sodium chloride 0.9 % flush 10 mL (has no administration in time range)   sodium chloride 0.9 % infusion 40 mL (has no administration in time range)   Enoxaparin Sodium (LOVENOX) syringe 40 mg (40 mg Subcutaneous Given 8/20/24 1020)   sodium chloride 0.9 % infusion (100 mL/hr Intravenous New Bag 8/20/24 1508)   labetalol (NORMODYNE,TRANDATE) injection 10 mg (has no administration in time range)   acetaminophen (TYLENOL) tablet 650 mg (has no administration in time range)     Or   acetaminophen (TYLENOL) suppository 650 mg (has no administration in time range)   dextrose (GLUTOSE) oral gel 15 g (has no administration in time range)   dextrose (D50W) (25 g/50 mL) IV injection 25 g (has no administration in time range)   glucagon (GLUCAGEN) injection 1 mg (has no administration in time range)   Insulin Lispro (humaLOG) injection 2-7 Units (2 Units Subcutaneous Given 8/20/24 1753)   cefTRIAXone (ROCEPHIN) 1,000 mg in sodium chloride 0.9 % 100 mL MBP (1,000 mg Intravenous New Bag 8/20/24 1251)   iopamidol (ISOVUE-370) 76 % injection 100 mL (100 mL Intravenous Given 8/20/24 0027)   perflutren (DEFINITY) lipid microspheres injection 6.52 mg (6.52 mg Intravenous Given 8/20/24 1422)       Vitals:    08/20/24 1526   BP: 150/70   Pulse: 100   Resp: 18   Temp: 98.9 °F (37.2 °C)   SpO2: 96%         Objective   Physical Exam  Vitals and nursing note reviewed.   Constitutional:       Appearance: He is well-developed.   HENT:      Head: Normocephalic and atraumatic.   Eyes:      Extraocular Movements: Extraocular movements intact.      Pupils: Pupils are equal, round, and reactive to light.   Cardiovascular:      Rate and Rhythm: Normal rate and regular rhythm.   Pulmonary:      Effort: Pulmonary effort is normal.      Breath sounds: Normal breath sounds.   Musculoskeletal:       Cervical back: Normal range of motion and neck supple.   Neurological:      Mental Status: He is alert. He is confused.      Comments: There may be a mild facial droop with the right eyelid lower than the left and right face being a slight droop.  However it is not marked.  He moves all extremities equally.  They do seem diffusely weak but there are no focal signs in the extremities.  Patient does answer questions but is very slow in his mentation and seems to not always give exactly correct answers.   Psychiatric:         Mood and Affect: Mood normal.         Behavior: Behavior normal.         Procedures         Lab Results (last 24 hours)       Procedure Component Value Units Date/Time    POC Glucose Once [[id number]]  (Abnormal) Collected: 08/20/24 0003    Specimen: Blood Updated: 08/20/24 0018     Glucose 150 mg/dL      Comment: : [id number] Kel OsorioMeter ID: [id number]       CBC & Differential [[id number]]  (Abnormal) Collected: 08/20/24 0008    Specimen: Blood Updated: 08/20/24 0055    Narrative:      The following orders were created for panel order CBC & Differential.  Procedure                               Abnormality         Status                     ---------                               -----------         ------                     CBC Auto Differential[[id number]]        Abnormal            Final result                 Please view results for these tests on the individual orders.    Comprehensive Metabolic Panel [[id number]]  (Abnormal) Collected: 08/20/24 0008    Specimen: Blood Updated: 08/20/24 0036     Glucose 154 mg/dL      BUN 9 mg/dL      Creatinine 0.78 mg/dL      Sodium 137 mmol/L      Potassium 4.0 mmol/L      Comment: Slight hemolysis detected by analyzer. Result may be falsely elevated.        Chloride 99 mmol/L      CO2 24.0 mmol/L      Calcium 9.2 mg/dL      Total Protein 6.9 g/dL      Albumin 3.7 g/dL      ALT (SGPT) 29 U/L      AST (SGOT) 21 U/L      Comment: Slight hemolysis  detected by analyzer. Result may be falsely elevated.        Alkaline Phosphatase 67 U/L      Total Bilirubin 0.8 mg/dL      Globulin 3.2 gm/dL      A/G Ratio 1.2 g/dL      BUN/Creatinine Ratio 11.5     Anion Gap 14.0 mmol/L      eGFR 95.9 mL/min/1.73     Narrative:      GFR Normal >60  Chronic Kidney Disease <60  Kidney Failure <15      Protime-INR [[id number]]  (Abnormal) Collected: 08/20/24 0008    Specimen: Blood Updated: 08/20/24 0026     Protime 14.9 Seconds      INR 1.12    aPTT [[id number]]  (Normal) Collected: 08/20/24 0008    Specimen: Blood Updated: 08/20/24 0026     PTT 31.5 seconds     CBC Auto Differential [[id number]]  (Abnormal) Collected: 08/20/24 0008    Specimen: Blood Updated: 08/20/24 0055     WBC 12.41 10*3/mm3      RBC 4.56 10*6/mm3      Hemoglobin 13.9 g/dL      Hematocrit 41.5 %      MCV 91.0 fL      MCH 30.5 pg      MCHC 33.5 g/dL      RDW 11.9 %      RDW-SD 40.4 fl      MPV 9.3 fL      Platelets 169 10*3/mm3      Neutrophil % 82.8 %      Lymphocyte % 11.7 %      Monocyte % 3.4 %      Eosinophil % 0.2 %      Basophil % 0.4 %      Immature Grans % 1.5 %      Neutrophils, Absolute 10.28 10*3/mm3      Lymphocytes, Absolute 1.45 10*3/mm3      Monocytes, Absolute 0.42 10*3/mm3      Eosinophils, Absolute 0.02 10*3/mm3      Basophils, Absolute 0.05 10*3/mm3      Immature Grans, Absolute 0.19 10*3/mm3      nRBC 0.0 /100 WBC     Hemoglobin A1c [[id number]]  (Abnormal) Collected: 08/20/24 0553    Specimen: Blood Updated: 08/20/24 0635     Hemoglobin A1C 6.20 %     Narrative:      Hemoglobin A1C Ranges:    Increased Risk for Diabetes  5.7% to 6.4%  Diabetes                     >= 6.5%  Diabetic Goal                < 7.0%    Lipid Panel [[id number]]  (Abnormal) Collected: 08/20/24 0553    Specimen: Blood Updated: 08/20/24 0646     Total Cholesterol 120 mg/dL      Triglycerides 108 mg/dL      HDL Cholesterol 35 mg/dL      LDL Cholesterol  65 mg/dL      VLDL Cholesterol 20 mg/dL      LDL/HDL Ratio 1.81     Narrative:      Cholesterol Reference Ranges  (U.S. Department of Health and Human Services ATP III Classifications)    Desirable          <200 mg/dL  Borderline High    200-239 mg/dL  High Risk          >240 mg/dL      Triglyceride Reference Ranges  (U.S. Department of Health and Human Services ATP III Classifications)    Normal           <150 mg/dL  Borderline High  150-199 mg/dL  High             200-499 mg/dL  Very High        >500 mg/dL    HDL Reference Ranges  (U.S. Department of Health and Human Services ATP III Classifications)    Low     <40 mg/dl (major risk factor for CHD)  High    >60 mg/dl ('negative' risk factor for CHD)        LDL Reference Ranges  (U.S. Department of Health and Human Services ATP III Classifications)    Optimal          <100 mg/dL  Near Optimal     100-129 mg/dL  Borderline High  130-159 mg/dL  High             160-189 mg/dL  Very High        >189 mg/dL    CBC (No Diff) [[id number]]  (Abnormal) Collected: 08/20/24 0553    Specimen: Blood Updated: 08/20/24 0623     WBC 12.84 10*3/mm3      RBC 4.51 10*6/mm3      Hemoglobin 13.6 g/dL      Hematocrit 41.5 %      MCV 92.0 fL      MCH 30.2 pg      MCHC 32.8 g/dL      RDW 12.2 %      RDW-SD 41.9 fl      MPV 8.9 fL      Platelets 164 10*3/mm3     Comprehensive Metabolic Panel [[id number]]  (Abnormal) Collected: 08/20/24 0553    Specimen: Blood Updated: 08/20/24 0646     Glucose 172 mg/dL      BUN 8 mg/dL      Creatinine 0.82 mg/dL      Sodium 140 mmol/L      Potassium 3.8 mmol/L      Chloride 101 mmol/L      CO2 28.0 mmol/L      Calcium 9.1 mg/dL      Total Protein 6.7 g/dL      Albumin 3.5 g/dL      ALT (SGPT) 25 U/L      AST (SGOT) 15 U/L      Alkaline Phosphatase 61 U/L      Total Bilirubin 0.7 mg/dL      Globulin 3.2 gm/dL      A/G Ratio 1.1 g/dL      BUN/Creatinine Ratio 9.8     Anion Gap 11.0 mmol/L      eGFR 94.5 mL/min/1.73     Narrative:      GFR Normal >60  Chronic Kidney Disease <60  Kidney Failure <15      TSH [[id number]]  (Normal)  Collected: 08/20/24 0553    Specimen: Blood Updated: 08/20/24 0830     TSH 2.490 uIU/mL     T4, Free [[id number]]  (Normal) Collected: 08/20/24 0553    Specimen: Blood Updated: 08/20/24 0830     Free T4 1.05 ng/dL     POC Glucose Once [[id number]]  (Abnormal) Collected: 08/20/24 0736    Specimen: Blood Updated: 08/20/24 0807     Glucose 153 mg/dL      Comment: : [id number] Salas EuraisaMeter ID: [id number]       POC Glucose Once [[id number]]  (Abnormal) Collected: 08/20/24 1132    Specimen: Blood Updated: 08/20/24 1144     Glucose 156 mg/dL      Comment: : [id number] Salas EuraisaMeter ID: [id number]       Urinalysis With Culture If Indicated - Urine, Clean Catch [[id number]]  (Abnormal) Collected: 08/20/24 1207    Specimen: Urine, Clean Catch Updated: 08/20/24 1219     Color, UA Yellow     Appearance, UA Cloudy     pH, UA >=9.0     Specific Gravity, UA 1.028     Glucose, UA Negative     Ketones, UA 15 mg/dL (1+)     Bilirubin, UA Negative     Blood, UA Trace     Protein,  mg/dL (2+)     Leuk Esterase, UA Moderate (2+)     Nitrite, UA Negative     Urobilinogen, UA 1.0 E.U./dL    Narrative:      In absence of clinical symptoms, the presence of pyuria, bacteria, and/or nitrites on the urinalysis result does not correlate with infection.    Urinalysis, Microscopic Only - Urine, Clean Catch [[id number]]  (Abnormal) Collected: 08/20/24 1207    Specimen: Urine, Clean Catch Updated: 08/20/24 1219     RBC, UA 0-2 /HPF      WBC, UA Too Numerous to Count /HPF      Bacteria, UA 4+ /HPF      Squamous Epithelial Cells, UA None Seen /HPF      Hyaline Casts, UA 3-6 /LPF      Methodology Automated Microscopy    Urine Culture - Urine, Urine, Clean Catch [[id number]] Collected: 08/20/24 1207    Specimen: Urine, Clean Catch Updated: 08/20/24 1219    POC Glucose Once [[id number]]  (Abnormal) Collected: 08/20/24 1631    Specimen: Blood Updated: 08/20/24 1649     Glucose 167 mg/dL      Comment: : [id number] [place]  ID: [id number]               XR Chest 1 View   Final Result   1. No acute disease.       This report was signed and finalized on 8/20/2024 6:25 AM by Dr. Dharmesh Gruber MD.          CT CEREBRAL PERFUSION WITH & WITHOUT CONTRAST   Final Result   Impression:   1. Today's exam demonstrates no evidence of a core infarct with no   volume of brain demonstrating a Tmax greater than 6.0 seconds or a CBF   of less than 30%. However, there is noted to be relative hypoperfusion   involving both the right MCA and PCA distributions with a Tmax of   greater than 4.0 seconds in both of these distributions on mapping. This   is substantiated on the rapid angio rendering MIPS with relative   diminished density of contrast within the right MCA distribution as well   as irregularity and diminished density of enhancement within the right   posterior cerebral artery distribution. Correlation with the CT   angiogram demonstrates that the cervical segment of the right internal   carotid artery is occluded. Flow to the right MCA distribution is   related to reconstitution from the contralateral hemisphere resulting in   a delay in transit time. There is also disease within the P1 segment of   the right posterior cerebral artery with diminished Tmax and a delayed   transit time into the right hemisphere in the PCA distribution.   2. The emergency room was notified of the findings       This report was signed and finalized on 8/20/2024 6:48 AM by Dr. Dharmesh Gruber MD.          CT Angiogram Neck   Final Result   1. Complete occlusion of the right internal carotid artery at the   origin/common carotid bifurcation. No reconstitution in the neck.   2. Atheromatous changes of the common and internal carotid arteries   bilaterally. No significant stenosis on the left.   3. A dominant left vertebral artery arises directly from the aortic   arch.   4. The NASCET criteria was utilized for estimation of carotid arterial   stenosis.       The  above study was initially reviewed and reported by [facility]. I do not   find any discrepancies.                                                   This report was signed and finalized on 8/20/2024 7:36 AM by Dr. Pascual Woodson MD.          CT Angiogram Head w AI Analysis of LVO   Final Result   1. The heavily atheromatous internal carotid arteries in the carotid   canal and cavernous sinus bilaterally.   2. Poor reopacification of the distal right internal carotid artery in   the distal carotid canal and right cavernous sinus from the left   internal carotid artery through the anterior communicating branch. The   remaining intracranial circulation is unremarkable without any area of   flow-limiting stenosis or aneurysmal dilatation.   3. A fetal-type left posterior circulation.   4. The NASCET criteria were utilized for estimation of carotid arterial   stenosis.       The above study was initially reviewed and reported by [facility]. I do not   find any discrepancies.       This report was signed and finalized on 8/20/2024 7:18 AM by Dr. Pascual Woodson MD.          CT Head Without Contrast Stroke Protocol   Final Result   1. No acute intracranial abnormality.       The above study was initially reviewed and reported by [facility]. I do not   find any discrepancies.                                                   This report was signed and finalized on 8/20/2024 5:33 AM by Dr. Pascual Woodson MD.          MRI Brain Without Contrast    (Results Pending)       ED Course  ED Course as of 08/20/24 1841   Tue Aug 20, 2024   0031 Code stroke was called and I spoke with Dr. Reyes.  Based on the patient's timeframe of being at least 4 hours and possibly more in the vague symptoms we will not proceed with any tPA therapy. [TR]   0734 Radiology attending had over read the CT and angio perfusion in the morning there is some hypoperfusion of the PCA in the MCA I have discussed this case with Dr. Goddard and inform the  hospitalist.  Of radiology over read [TS]      ED Course User Index  [TR] Oziel Bernal Jr., MD  [TS] Jeferson Willis MD          MDM  Number of Diagnoses or Management Options  Altered mental status, unspecified altered mental status type: new and requires workup  Diagnosis management comments: Patient CT head is initially negative.  His CT perfusion is also negative.  However his CTA does reveal a total occlusion of the right ICA.  There is no signs of an acute stroke right now but 1 would worry about other potential problems.  We will admit for further care.  Code stroke was called initially but after discussion with Dr. Goddard it was felt the patient was not a tPA candidate because of the vague symptoms and of the uncertain timeframe.       Amount and/or Complexity of Data Reviewed  Clinical lab tests: ordered and reviewed  Tests in the radiology section of CPT®: ordered and reviewed  Tests in the medicine section of CPT®: ordered and reviewed  Discuss the patient with other providers: yes    Risk of Complications, Morbidity, and/or Mortality  Presenting problems: high  Diagnostic procedures: moderate  Management options: moderate    Patient Progress  Patient progress: stable        Final diagnoses:   Altered mental status, unspecified altered mental status type          Oziel Bernal Jr., MD  08/20/24 0113       Oziel Bernal Jr., MD  08/20/24 7509

## 2024-08-20 NOTE — THERAPY EVALUATION
Acute Care - Speech Language Pathology   Swallow Initial Evaluation [facility]     Patient Name: Ghulam Artis  : 1955  MRN: [id number]  Today's Date: 2024               Admit Date: 2024    SPEECH-LANGUAGE PATHOLOGY EVALUATION - SWALLOW    Subjective: The patient was seen on this date for a Clinical Swallow evaluation.  Patient was alert and cooperative.  Significant history: Patient is admitted with balance disorder, HTN, right ICA occlusion, and MACRINA. Medical history includes DM, COPD, dyslipidemia, and GERD. Patient reports feeling confused with inability to get up to walk easily on the previous day. Patient failed a bedside swallow screen due to facial assymetry.     Objective: Textures given included thin liquid and regular consistency.    Assessment: Difficulties were noted with none of the above consistencies.    Observations: Oral mech examination revealed a right sided labial/facial droop that is subtle and only noticeable during conversational or natural responses. During structures tasks, movement is intact. No other deficits are noted. No s/s aspiration with any consistency provided.     SLP Findings:  Patient presents with functional swallow, with esophageal component.     Recommendations: Diet Textures: thin liquid, regular consistency food.  Medications should be taken whole with thin liquids. May have water and ice between meals after oral care, under staff or family supervision and with the recommended strategies for safe swallowing.     Recommended Strategies: Observe reflux precautions such as avoiding eating within 2 hours of reclining for sleep and maintain 30 degree HOB elevation while sleeping, Upright for PO, small bites and sips, may use straw, and alternate liquids and solids. Oral care before breakfast, after all meals and PRN.    Other Recommended Evaluations: Cognitive-Linguistic Evaluation as indicated, if not returned to baseline.      Dysphagia therapy is not  recommended. Rationale: No deficits identified which impact swallow function.    Carla Schaeffer, SLP 8/20/2024 09:44 CDT      Visit Dx:     ICD-10-CM ICD-9-CM   1. Altered mental status, unspecified altered mental status type  R41.82 780.97   2. Dysphagia, unspecified type  R13.10 787.20     Patient Active Problem List   Diagnosis    Hx of adenomatous colonic polyps    Hemoptysis    Restrictive lung disease    MACRINA (obstructive sleep apnea)    Cigarette nicotine dependence without complication    Overweight    Bradycardia    Balance disorder    Right internal carotid occlusion    Primary hypertension     Past Medical History:   Diagnosis Date    GERD (gastroesophageal reflux disease)     Hyperlipidemia     Hypertension     Sleep apnea     cpap at hs     Past Surgical History:   Procedure Laterality Date    CATARACT EXTRACTION Bilateral     COLONOSCOPY N/A 09/24/2018    6 mm hyperplastic polyp was found at 20 cm proximal to the anus, tics sigmoid colon    COLONOSCOPY N/A 2/21/2024    Procedure: COLONOSCOPY WITH ANESTHESIA;  Surgeon: Zack Moser MD;  Location: [facility] ENDOSCOPY;  Service: Gastroenterology;  Laterality: N/A;  Pre: Hx of adenomatous colonic polyps;  Post: Diverticulosis, Polyps;  Daren Castillo MD    COLONOSCOPY W/ POLYPECTOMY  05/13/2013    TUBULAR ADENOMA POLYP AT 60 CM, HYPERPLASTIC POLYP AT 30 CM, DIVERTICULOSIS SIGMOID COLON, RECALL 5YRS    COLONOSCOPY W/ POLYPECTOMY  02/26/2008    HYPERPLASTIC POLYPS RECTUM, SCATTERED DIVERTICULA    ENDOSCOPY N/A 2/21/2024    Procedure: ESOPHAGOGASTRODUODENOSCOPY WITH ANESTHESIA;  Surgeon: Zack Moser MD;  Location: [facility] ENDOSCOPY;  Service: Gastroenterology;  Laterality: N/A;  Pre: Screen;  Post: Normal;  Daren Castillo MD       SLP Recommendation and Plan  SLP Swallowing Diagnosis: swallow WFL/no suspected pharyngeal impairment (08/20/24 0830)  SLP Diet Recommendation: regular textures, thin liquids (08/20/24 0830)  Recommended Precautions  and Strategies: upright posture during/after eating, small bites of food and sips of liquid, alternate between small bites of food and sips of liquid, reflux precautions, general aspiration precautions (08/20/24 0830)  SLP Rec. for Method of Medication Administration: meds whole, with thin liquids (08/20/24 0830)        Recommended Diagnostics: SLE/Cog/Motor Speech Evaluation (08/20/24 0830)  Swallow Criteria for Skilled Therapeutic Interventions Met: other (see comments) (Patient reports increased confusion. No order for cognitive communication evaluation. Will request and complete as indicated.) (08/20/24 0830)        Therapy Frequency (Swallow): evaluation only (08/20/24 0830)     Oral Care Recommendations: Oral Care before breakfast, after meals and PRN (08/20/24 0830)                                        Plan of Care Reviewed With: patient, spouse, caregiver  Progress: improving      SWALLOW EVALUATION (Last 72 Hours)       SLP Adult Swallow Evaluation       Row Name 08/20/24 0830                   Rehab Evaluation    Document Type evaluation  -MD        Subjective Information no complaints  -MD        Patient Observations alert;cooperative;agree to therapy  -MD        Patient/Family/Caregiver Comments/Observations wife present  -MD        Patient Effort excellent  -MD        Symptoms Noted During/After Treatment none  -MD           General Information    Patient Profile Reviewed yes  -MD        Pertinent History Of Current Problem Patient is admitted with balance disorder, HTN, right ICA occlusion, and MACRINA. Medical history includes DM, COPD, dyslipidemia, and GERD. Patient reports feeling confused with inability to get up to walk easily on the previous day. Patient failed a bedside swallow screen due to facial assymetry.  -MD        Current Method of Nutrition NPO  -MD        Precautions/Limitations, Vision WFL;for purposes of eval  -MD        Precautions/Limitations, Hearing WFL;for purposes of eval  -MD         Prior Level of Function-Communication unknown  -MD        Prior Level of Function-Swallowing no diet consistency restrictions  -MD        Plans/Goals Discussed with patient;spouse/S.O.  -MD        Barriers to Rehab none identified  -MD        Patient's Goals for Discharge return to all previous roles/activities  -MD           Pain    Additional Documentation Pain Scale: Numbers Pre/Post-Treatment (Group)  -MD           Pain Scale: Numbers Pre/Post-Treatment    Pretreatment Pain Rating 5/10  -MD        Posttreatment Pain Rating 5/10  -MD        Pain Location - Side/Orientation Bilateral  -MD        Pain Location lower  -MD        Pain Location - back  -MD        Pain Intervention(s) Nursing Notified;Repositioned  -MD           Oral Motor Structure and Function    Dentition Assessment natural, present and adequate  -MD        Secretion Management WNL/WFL  -MD        Mucosal Quality moist, healthy  -MD           Oral Musculature and Cranial Nerve Assessment    Oral Motor General Assessment oral labial or buccal impairment  -MD        Oral Labial or Buccal Impairment, Detail, Cranial Nerve VII (Facial): right labial droop  -MD           General Eating/Swallowing Observations    Respiratory Support Currently in Use room air  -MD        Eating/Swallowing Skills self-fed  -MD        Positioning During Eating upright in bed  -MD        Utensils Used straw  -MD        Consistencies Trialed regular textures;thin liquids  -MD           Respiratory    Respiratory Status WFL  -MD           Clinical Swallow Eval    Oral Prep Phase WFL  -MD        Oral Transit WFL  -MD        Oral Residue WFL  -MD        Pharyngeal Phase WFL  -MD        Esophageal Phase suspected esophageal impairment  -MD        Clinical Swallow Evaluation Summary reports of GERD with globus sensation, chronic  -MD           Esophageal Phase Concerns    Esophageal Phase Concerns globus  -MD        Globus all consistencies;other (see comments)  chronic  -MD            SLP Evaluation Clinical Impression    SLP Swallowing Diagnosis swallow WFL/no suspected pharyngeal impairment  -MD        Functional Impact no impact on function  -MD        Swallow Criteria for Skilled Therapeutic Interventions Met other (see comments)  Patient reports increased confusion. No order for cognitive communication evaluation. Will request and complete as indicated.  -MD           Recommendations    Therapy Frequency (Swallow) evaluation only  -MD        SLP Diet Recommendation regular textures;thin liquids  -MD        Recommended Diagnostics SLE/Cog/Motor Speech Evaluation  -MD        Recommended Precautions and Strategies upright posture during/after eating;small bites of food and sips of liquid;alternate between small bites of food and sips of liquid;reflux precautions;general aspiration precautions  -MD        Oral Care Recommendations Oral Care before breakfast, after meals and PRN  -MD        SLP Rec. for Method of Medication Administration meds whole;with thin liquids  -MD                  User Key  (r) = Recorded By, (t) = Taken By, (c) = Cosigned By      Initials Name Effective Dates    Carla Belle, SLP 06/21/22 -                     EDUCATION  The patient has been educated in the following areas:   Dysphagia (Swallowing Impairment).                Time Calculation:    Time Calculation- SLP       Row Name 08/20/24 0943             Time Calculation- SLP    SLP Start Time 0830  -MD      SLP Stop Time 0938  -MD      SLP Time Calculation (min) 68 min  -MD      SLP Received On 08/20/24  -MD         Untimed Charges    51085-XM Eval Oral Pharyng Swallow Minutes 68  -MD         Total Minutes    Untimed Charges Total Minutes 68  -MD       Total Minutes 68  -MD                User Key  (r) = Recorded By, (t) = Taken By, (c) = Cosigned By      Initials Name Provider Type    Carla Belle, SLP Speech and Language Pathologist                    Therapy Charges for Today       Code  Description Service Date Service Provider Modifiers Qty    [id number]  ST EVAL ORAL PHARYNG SWALLOW 5 8/20/2024 Carla Schaeffer, SLP GN 1                 Carla Schaeffer, SLP  8/20/2024

## 2024-08-20 NOTE — PROGRESS NOTES
Inpatient Nutrition Services  Patient Name:  Ghulam Artis  YOB: 1955  MRN: [id number]  Admit Date:  8/20/2024  Assessment Date:  8/20/2024   Reason for Assessment       Row Name 08/20/24 1642          Reason for Assessment    Reason For Assessment per organizational policy     Diagnosis neurologic conditions     Identified At Risk by Screening Criteria need for education          Nutrition/Diet History       Row Name 08/20/24 1642          Nutrition/Diet History    Typical Intake (Food/Fluid/EN/PN) DM edu per stroke protocol. A1c 6.2%. Pt has been on Mounjaro. A1c has improved per pt and pt spouse. Will continue to monitor per protocol.          Labs/Tests/Procedures/Meds       Row Name 08/20/24 1647          Labs/Procedures/Meds    Lab Results Reviewed reviewed     Lab Results Comments A1c 6.2%         Electronically signed by:  Nunu Shane RDN, ARIEL  08/20/24 16:43 CDT

## 2024-08-20 NOTE — PLAN OF CARE
Goal Outcome Evaluation:  Plan of Care Reviewed With: patient, spouse           Outcome Evaluation: PT onural completed. pt in fowlers positio, AOx4 with complaints of generalized low back pain. Today pt agreeable to session and reported independence at baseline. Pt did state he fatigues quickly and requires frequent rest breaks, Today pt demo L roll with and supine>sit with SBA. In sitting pt demo functional AROM and LE strength. During L ankle testing pt demo 5 clonus beats and had mild hypertonia to L ankle, R ankle negative. pt performed sit>stand and ambulated 150' CGA and HHA, while requiring 2 standing rest breaks of 3-4 min each to recover. Pt demo decreased gait speed and decreased stride length but remained functional. Pt returned to room and left sitting in recliner with spouse at bedside. Pt will benefit from skilled PT services to imrpove activity tolerance, decrease fall risk and return to PLOF.      Anticipated Discharge Disposition (PT): home with assist

## 2024-08-20 NOTE — CASE MANAGEMENT/SOCIAL WORK
Continued Stay Note   [facility]ie     Patient Name: Ghulam Artis  MRN: [id number]  Today's Date: 8/20/2024    Admit Date: 8/20/2024        Discharge Plan       Row Name 08/20/24 1037       Plan    Patient/Family in Agreement with Plan unable to assess    Plan Comments Pt out of room for testing.  SW will screen later.      Row Name 08/20/24 1036       Plan    Final Discharge Disposition Code 01 - home or self-care                   Discharge Codes    No documentation.                       RAPHAEL CanoW

## 2024-08-20 NOTE — PLAN OF CARE
Goal Outcome Evaluation:  Plan of Care Reviewed With: patient        Progress: improving  Outcome Evaluation: VSS. No c/o pain. Pt has some drooping on the left side of his face at rest, so he failed the swallow screen. Strict NPO. NS at 100 mL/hr. Wife is at bedside. Bed alarm set. Safety maintained. S 85-89 on tele.

## 2024-08-20 NOTE — PROGRESS NOTES
"Patient seen and examined after midnight by Dr. Francis.      Mr. Artis is a 68-year-old male who presented to [facility] on 8/20 with altered mentation after awakening.  He follows with Dr. Lopez and [facility] - treated for amnestic mild cognitive impairment. He has been tried on Aricept and Namenda in the past. He has a history of obstructive sleep apnea but is noncompliant with his CPAP.  He does take aspirin 81 mg and Zocor 20 mg as outpatient.      Reportedly for the last month when patient wakes up he \"does not feel right.\"  As the day goes on he feels better.  His wife does report that he snores when he sleeps.  Around 10 AM on 8/19 patient and spouse took their dog to the groomer.  When they returned around noon he took a nap.  When he woke up per wife was somewhat \"out of it.\" No seizure activity was seen. He had no unilateral weakness or numbness.    In the ED, CT head showed no acute findings. CT angiography shows occlusion of the right internal carotid artery just after bifurcation. There is also heavy plaque burden in the intracranial arteries.  Chest x-ray shows no acute findings.    Treatment Plan  Neurology, Dr. Goddard consulted.  MRI brain and EEG.    Patient complains of dysuria and urgency.  Check UA.    Strongly recommend treatment of obstructive sleep apnea as outpatient.    PT/OT.    Lovenox for VTE prophylaxis.    Electronically signed by LUDIVINA Valdez, 08/20/24, 12:16 PM CDT.    "

## 2024-08-20 NOTE — ED NOTES
"Nursing report ED to floor  Ghulam Artis  68 y.o.  male    HPI:   Chief Complaint   Patient presents with    Altered Mental Status       Admitting doctor:   Rodri Francis MD    Consulting provider(s):  Consults       Date and Time Order Name Status Description    8/20/2024 12:30 AM Inpatient Neurology Consult Stroke      8/20/2024 12:30 AM Inpatient Neurology Consult Stroke               Admitting diagnosis:   The encounter diagnosis was Altered mental status, unspecified altered mental status type.    Code status:   Current Code Status       Date Active Code Status Order ID Comments User Context       Not on file            Allergies:   Patient has no known allergies.    Intake and Output  No intake or output data in the 24 hours ending 08/20/24 0129    Weight:       08/20/24  0004   Weight: 87.5 kg (192 lb 12.8 oz)       Most recent vitals:   Vitals:    08/20/24 0004 08/20/24 0046   BP: 150/71 170/75   BP Location:  Right arm   Patient Position:  Sitting   Pulse: 72 99   Resp: 16 19   Temp: 99.7 °F (37.6 °C) 99.7 °F (37.6 °C)   TempSrc: Oral Oral   SpO2: 96% 94%   Weight: 87.5 kg (192 lb 12.8 oz)    Height: 167.6 cm (66\")      Oxygen Therapy: .    Active LDAs/IV Access:   Lines, Drains & Airways       Active LDAs       Name Placement date Placement time Site Days    Peripheral IV 08/20/24 0014 Posterior;Right Hand 08/20/24  0014  Hand  less than 1    Peripheral IV 08/20/24 0014 Left;Posterior Hand 08/20/24  0014  Hand  less than 1                    Labs (abnormal labs have a star):   Labs Reviewed   COMPREHENSIVE METABOLIC PANEL - Abnormal; Notable for the following components:       Result Value    Glucose 154 (*)     All other components within normal limits    Narrative:     GFR Normal >60  Chronic Kidney Disease <60  Kidney Failure <15     PROTIME-INR - Abnormal; Notable for the following components:    Protime 14.9 (*)     INR 1.12 (*)     All other components within normal limits   CBC WITH " AUTO DIFFERENTIAL - Abnormal; Notable for the following components:    WBC 12.41 (*)     RDW 11.9 (*)     Neutrophil % 82.8 (*)     Lymphocyte % 11.7 (*)     Monocyte % 3.4 (*)     Eosinophil % 0.2 (*)     Immature Grans % 1.5 (*)     Neutrophils, Absolute 10.28 (*)     Immature Grans, Absolute 0.19 (*)     All other components within normal limits   POCT GLUCOSE FINGERSTICK - Abnormal; Notable for the following components:    Glucose 150 (*)     All other components within normal limits   APTT - Normal   RAINBOW DRAW    Narrative:     The following orders were created for panel order Emily Draw.  Procedure                               Abnormality         Status                     ---------                               -----------         ------                     Green Top (Gel)[[id number]]                                  Final result               Lavender Top[[id number]]                                     Final result               Red Top[[id number]]                                          Final result               Gill Top[[id number]]                                         Final result               Light Blue Top[[id number]]                                   Final result                 Please view results for these tests on the individual orders.   TYPE AND SCREEN   GREEN TOP   LAVENDER TOP   RED TOP   GRAY TOP   LIGHT BLUE TOP   CBC AND DIFFERENTIAL    Narrative:     The following orders were created for panel order CBC & Differential.  Procedure                               Abnormality         Status                     ---------                               -----------         ------                     CBC Auto Differential[[id number]]        Abnormal            Final result                 Please view results for these tests on the individual orders.       Meds given in ED:   Medications   sodium chloride 0.9 % flush 10 mL (has no administration in time range)   iopamidol (ISOVUE-370) 76 %  injection 100 mL (100 mL Intravenous Given 8/20/24 0027)           NIH Stroke Scale:  Interval: baseline    Isolation/Infection(s):  No active isolations   No active infections     COVID Testing  Collected .  Resulted .    Nursing report ED to floor:  Mental status: .  Ambulatory status: .  Precautions: .    ED nurse phone extentsion- ..

## 2024-08-20 NOTE — PLAN OF CARE
Goal Outcome Evaluation:  Plan of Care Reviewed With: patient, spouse, caregiver     Progress: improving     SLP Swallowing Diagnosis: swallow WFL/no suspected pharyngeal impairment (08/20/24 0830)       SPEECH-LANGUAGE PATHOLOGY EVALUATION - SWALLOW    Subjective: The patient was seen on this date for a Clinical Swallow evaluation.  Patient was alert and cooperative.  Significant history: Patient is admitted with balance disorder, HTN, right ICA occlusion, and MACRINA. Medical history includes DM, COPD, dyslipidemia, and GERD. Patient reports feeling confused with inability to get up to walk easily on the previous day. Patient failed a bedside swallow screen due to facial assymetry.     Objective: Textures given included thin liquid and regular consistency.    Assessment: Difficulties were noted with none of the above consistencies.    Observations:  Oral mech examination revealed a right sided labial/facial droop that is subtle and only noticeable during conversational or natural responses. During structures tasks, movement is intact. No other deficits are noted. No s/s aspiration with any consistency provided.     SLP Findings:  Patient presents with functional swallow, with esophageal component.     Recommendations: Diet Textures: thin liquid, regular consistency food.  Medications should be taken whole with thin liquids. May have water and ice between meals after oral care, under staff or family supervision and with the recommended strategies for safe swallowing.     Recommended Strategies:  Observe reflux precautions such as avoiding eating within 2 hours of reclining for sleep and maintain 30 degree HOB elevation while sleeping, Upright for PO, small bites and sips, may use straw, and alternate liquids and solids. Oral care before breakfast, after all meals and PRN.    Other Recommended Evaluations: Cognitive-Linguistic Evaluation as indicated, if not returned to baseline.      Dysphagia therapy is not  recommended. Rationale: No deficits identified which impact swallow function.      Carla Schaeffer, SLP 8/20/2024 09:40 CDT

## 2024-08-20 NOTE — THERAPY EVALUATION
Patient Name: Ghulam Artis  : 1955    MRN: [id number]                              Today's Date: 2024       Admit Date: 2024    Visit Dx:     ICD-10-CM ICD-9-CM   1. Altered mental status, unspecified altered mental status type  R41.82 780.97   2. Dysphagia, unspecified type  R13.10 787.20   3. Impaired mobility [Z74.09]  Z74.09 799.89     Patient Active Problem List   Diagnosis    Hx of adenomatous colonic polyps    Hemoptysis    Restrictive lung disease    MACRINA (obstructive sleep apnea)    Cigarette nicotine dependence without complication    Overweight    Bradycardia    Balance disorder    Right internal carotid occlusion    Primary hypertension     Past Medical History:   Diagnosis Date    GERD (gastroesophageal reflux disease)     Hyperlipidemia     Hypertension     Sleep apnea     cpap at hs     Past Surgical History:   Procedure Laterality Date    CATARACT EXTRACTION Bilateral     COLONOSCOPY N/A 2018    6 mm hyperplastic polyp was found at 20 cm proximal to the anus, tics sigmoid colon    COLONOSCOPY N/A 2024    Procedure: COLONOSCOPY WITH ANESTHESIA;  Surgeon: Zack Moser MD;  Location: [facility] ENDOSCOPY;  Service: Gastroenterology;  Laterality: N/A;  Pre: Hx of adenomatous colonic polyps;  Post: Diverticulosis, Polyps;  Daren Castillo MD    COLONOSCOPY W/ POLYPECTOMY  2013    TUBULAR ADENOMA POLYP AT 60 CM, HYPERPLASTIC POLYP AT 30 CM, DIVERTICULOSIS SIGMOID COLON, RECALL 5YRS    COLONOSCOPY W/ POLYPECTOMY  2008    HYPERPLASTIC POLYPS RECTUM, SCATTERED DIVERTICULA    ENDOSCOPY N/A 2024    Procedure: ESOPHAGOGASTRODUODENOSCOPY WITH ANESTHESIA;  Surgeon: Zack Moser MD;  Location: [facility] ENDOSCOPY;  Service: Gastroenterology;  Laterality: N/A;  Pre: Screen;  Post: Normal;  Daren Castillo MD      General Information       Row Name 24 8490          Physical Therapy Time and Intention    Document Type evaluation  presents with lethargy  and confusion Dx; AMS and h/o R occlusion to R ICA. H/o COPD, htn, dyslipidemia, GERD  -     Mode of Treatment physical therapy  -       Row Name 08/20/24 1408          General Information    Patient Profile Reviewed yes  -AJ     Prior Level of Function independent:;all household mobility;community mobility;home management;gait;ADL's  -     Existing Precautions/Restrictions fall  -     Barriers to Rehab medically complex  -       Row Name 08/20/24 1408          Living Environment    People in Home spouse  -       Row Name 08/20/24 1408          Home Main Entrance    Number of Stairs, Main Entrance none  -       Row Name 08/20/24 1408          Stairs Within Home, Primary    Number of Stairs, Within Home, Primary none  -       Row Name 08/20/24 1408          Cognition    Orientation Status (Cognition) oriented x 4  -       Row Name 08/20/24 1408          Safety Issues, Functional Mobility    Impairments Affecting Function (Mobility) balance;endurance/activity tolerance;shortness of breath;pain;sensation/sensory awareness  -               User Key  (r) = Recorded By, (t) = Taken By, (c) = Cosigned By      Initials Name Provider Type    Kishore Mckeon, PT DPT Physical Therapist                   Mobility       Row Name 08/20/24 1408          Bed Mobility    Bed Mobility rolling left;supine-sit  -     Rolling Left Bowie (Bed Mobility) standby assist  -     Supine-Sit Bowie (Bed Mobility) standby assist  -     Assistive Device (Bed Mobility) bed rails;head of bed elevated  -       Row Name 08/20/24 1408          Bed-Chair Transfer    Bed-Chair Bowie (Transfers) contact guard  -     Assistive Device (Bed-Chair Transfers) other (see comments)  HHA  -       Row Name 08/20/24 1408          Sit-Stand Transfer    Sit-Stand Bowie (Transfers) standby assist  -     Assistive Device (Sit-Stand Transfers) other (see comments)  A  -       Row Name 08/20/24 1408           Gait/Stairs (Locomotion)    [place] Level (Gait) contact guard  -     Assistive Device (Gait) other (see comments)  A  -     Distance in Feet (Gait) 150  -     Deviations/Abnormal Patterns (Gait) bilateral deviations;base of support, narrow;urmila decreased;gait speed decreased;stride length decreased  -     Bilateral Gait Deviations forward flexed posture  -     Right Sided Gait Deviations lateral trunk flexion;leans right  -               User Key  (r) = Recorded By, (t) = Taken By, (c) = Cosigned By      Initials Name Provider Type    Kishore Mckeon PT DPT Physical Therapist                   Obj/Interventions       Row Name 08/20/24 1408          Range of Motion Comprehensive    General Range of Motion bilateral lower extremity ROM WFL  -       Row Name 08/20/24 1408          Strength Comprehensive (MMT)    General Manual Muscle Testing (MMT) Assessment no strength deficits identified  -     Comment, General Manual Muscle Testing (MMT) Assessment Remains WFL  -       Row Name 08/20/24 1408          Balance    Balance Assessment sitting static balance;sitting dynamic balance;standing static balance;standing dynamic balance  -     Static Sitting Balance independent  -     Dynamic Sitting Balance independent  -     Position, Sitting Balance unsupported;sitting edge of bed  -     Static Standing Balance standby assist;contact guard  -     Dynamic Standing Balance contact guard  -     Position/Device Used, Standing Balance supported;other (see comments)  A  -     Balance Interventions sitting;standing;sit to stand;supported;static;dynamic  -       Row Name 08/20/24 1408          Sensory Assessment (Somatosensory)    Sensory Assessment (Somatosensory) left LE;right LE  -AJ     Left LE Sensory Assessment light touch awareness;impaired;other (see comments);deep pressure awareness;proprioception;sharp-dull discrimination;stereognosis;intact  L foot  -AJ     Right LE  Sensory Assessment general sensation;intact  -AJ               User Key  (r) = Recorded By, (t) = Taken By, (c) = Cosigned By      Initials Name Provider Type    Kishore Mckeon, PT DPT Physical Therapist                   Goals/Plan       Row Name 08/20/24 140          Bed Mobility Goal 1 (PT)    Activity/Assistive Device (Bed Mobility Goal 1, PT) rolling to left;rolling to right;sit to supine;supine to sit  -AJ     [place] Level/Cues Needed (Bed Mobility Goal 1, PT) independent  -AJ     Time Frame (Bed Mobility Goal 1, PT) long term goal (LTG);10 days  -AJ     Progress/Outcomes (Bed Mobility Goal 1, PT) new goal  -AJ       Row Name 08/20/24 1408          Transfer Goal 1 (PT)    Activity/Assistive Device (Transfer Goal 1, PT) sit-to-stand/stand-to-sit;bed-to-chair/chair-to-bed;toilet;walk-in shower;car transfer  -AJ     [place] Level/Cues Needed (Transfer Goal 1, PT) independent  -AJ     Time Frame (Transfer Goal 1, PT) long term goal (LTG);10 days  -AJ     Progress/Outcome (Transfer Goal 1, PT) new goal  -AJ       Row Name 08/20/24 140          Gait Training Goal 1 (PT)    Activity/Assistive Device (Gait Training Goal 1, PT) gait (walking locomotion);decrease asymmetrical patterns;decrease fall risk;diminish gait deviation;forward stepping;improve balance and speed;increase endurance/gait distance;increase energy conservation  -AJ     [place] Level (Gait Training Goal 1, PT) standby assist  -AJ     Distance (Gait Training Goal 1, PT) 250' with pain 3/10 or less  -AJ     Time Frame (Gait Training Goal 1, PT) long term goal (LTG);10 days  -AJ     Progress/Outcome (Gait Training Goal 1, PT) new goal  -       Row Name 08/20/24 1403          Problem Specific Goal 1 (PT)    Problem Specific Goal 1 (PT) Pt will demo HEP independently as needed to maintain functional strength and return to PLOF  -AJ     Time Frame (Problem Specific Goal 1, PT) long-term goal (LTG);by discharge  -AJ      Strategies/Barriers (Problem Specific Goal 1, PT) report pain less than 3/10  -     Progress/Outcome (Problem Specific Goal 1, PT) new goal  -       Row Name 08/20/24 0685          Therapy Assessment/Plan (PT)    Planned Therapy Interventions (PT) balance training;bed mobility training;gait training;home exercise program;patient/family education;transfer training;postural re-education;strengthening;ROM (range of motion)  -               User Key  (r) = Recorded By, (t) = Taken By, (c) = Cosigned By      Initials Name Provider Type    Kishore Mckeon, PT DPT Physical Therapist                   Clinical Impression       Row Name 08/20/24 1409          Pain    Pretreatment Pain Rating 5/10  -AJ     Posttreatment Pain Rating 5/10  -     Pain Location - Side/Orientation Bilateral  -     Pain Location lower  -AJ     Pain Location - back  -AJ     Pain Intervention(s) Ambulation/increased activity;Nursing Notified;Repositioned  -     Additional Documentation Pain Scale: Numbers Pre/Post-Treatment (Group)  -       Row Name 08/20/24 1974          Plan of Care Review    Plan of Care Reviewed With patient;spouse  -     Outcome Evaluation [facility] complaints of generalized low back pain. Today pt agreeable to session and reported independence at baseline. Pt did state he fatigues quickly and requires frequent rest breaks, Today pt demo L roll with and supine>sit with SBA. In sitting pt demo functional AROM and LE strength. During L ankle testing pt demo 5 clonus beats and had mild hypertonia to L ankle, R ankle negative. pt performed sit>stand and ambulated 150' CGA and HHA, while requiring 2 standing rest breaks of 3-4 min each to recover. Pt demo decreased gait speed and decreased stride length but remained functional. Pt returned to room and left sitting in recliner with spouse at bedside. Pt will benefit from skilled PT services to imrpove activity tolerance,  decrease fall risk and return to PLOF.  -AJ       Row Name 08/20/24 1408          Therapy Assessment/Plan (PT)    Patient/Family Therapy Goals Statement (PT) go home  -AJ     Rehab Potential (PT) good, to achieve stated therapy goals  -AJ     Criteria for Skilled Interventions Met (PT) yes;meets criteria;skilled treatment is necessary  -AJ     Therapy Frequency (PT) 2 times/day  -AJ     Predicted Duration of Therapy Intervention (PT) until d/c  -AJ       Row Name 08/20/24 1408          Vital Signs    Pre Patient Position Supine  -AJ     Intra Patient Position Standing  -AJ     Post Patient Position Sitting  -AJ       Row Name 08/20/24 1408          Positioning and Restraints    Pre-Treatment Position in bed  -AJ     Post Treatment Position chair  -AJ     In Chair notified nsg;call light within reach;encouraged to call for assist;sitting;with family/caregiver  -AJ               User Key  (r) = Recorded By, (t) = Taken By, (c) = Cosigned By      Initials Name Provider Type    Kishore Mckeon, PT DPT Physical Therapist                   Outcome Measures       Row Name 08/20/24 1408 08/20/24 0953       How much help from another person do you currently need...    Turning from your back to your side while in flat bed without using bedrails? 4  -AJ 3  -SM    Moving from lying on back to sitting on the side of a flat bed without bedrails? 4  -AJ 3  -SM    Moving to and from a bed to a chair (including a wheelchair)? 3  -AJ 3  -SM    Standing up from a chair using your arms (e.g., wheelchair, bedside chair)? 4  -AJ 3  -SM    Climbing 3-5 steps with a railing? 2  -AJ 1  -SM    To walk in hospital room? 3  -AJ --    AM-PAC 6 Clicks Score (PT) 20  -AJ --    Highest Level of Mobility Goal 6 --> Walk 10 steps or more  -AJ --      Row Name 08/20/24 1408          Functional Assessment    Outcome Measure Options AM-PAC 6 Clicks Basic Mobility (PT)  -AJ               User Key  (r) = Recorded By, (t) = Taken By, (c) = Cosigned By       Initials Name Provider Type    AJ Kishore Magana, PT DPT Physical Therapist    Binta Peterson RN Registered Nurse                                 Physical Therapy Education       Title: PT OT SLP Therapies (Done)       Topic: Physical Therapy (Done)       Point: Mobility training (Done)       Learning Progress Summary             Patient Acceptance, E, VU by JERZY at 8/20/2024 1533    Comment: fall risk, role of PT, beginning HEP                         Point: Home exercise program (Done)       Learning Progress Summary             Patient Acceptance, E, VU by JERZY at 8/20/2024 1533    Comment: fall risk, role of PT, beginning HEP                         Point: Body mechanics (Done)       Learning Progress Summary             Patient Acceptance, E, VU by JERZY at 8/20/2024 1533    Comment: fall risk, role of PT, beginning HEP                         Point: Precautions (Done)       Learning Progress Summary             Patient Acceptance, E, VU by  at 8/20/2024 1533    Comment: fall risk, role of PT, beginning HEP                                         User Key       Initials Effective Dates Name Provider Type Discipline     08/15/24 -  Kishore Magana PT DPT Physical Therapist PT                  PT Recommendation and Plan  Planned Therapy Interventions (PT): balance training, bed mobility training, gait training, home exercise program, patient/family education, transfer training, postural re-education, strengthening, ROM (range of motion)  Plan of Care Reviewed With: patient, spouse  Outcome Evaluation: PT eval completed. [facility], AOx4 with complaints of generalized low back pain. Today pt agreeable to session and reported independence at baseline. Pt did state he fatigues quickly and requires frequent rest breaks, Today pt demo L roll with and supine>sit with SBA. In sitting pt demo functional AROM and LE strength. During L ankle testing pt demo 5 clonus beats and had mild hypertonia to L  ankle, R ankle negative. pt performed sit>stand and ambulated 150' CGA and HHA, while requiring 2 standing rest breaks of 3-4 min each to recover. Pt demo decreased gait speed and decreased stride length but remained functional. Pt returned to room and left sitting in recliner with spouse at bedside. Pt will benefit from skilled PT services to imrpove activity tolerance, decrease fall risk and return to PLOF.     Time Calculation:         PT Charges       Row Name 08/20/24 1408             Time Calculation    Start Time 1408  -AJ      Stop Time 1440  +8 for chart review  -      Time Calculation (min) 32 min  -AJ      PT Received On 08/20/24  -AJ      PT Goal Re-Cert Due Date 08/30/24  -AJ         Untimed Charges    PT Eval/Re-eval Minutes 40  -AJ         Total Minutes    Untimed Charges Total Minutes 40  -AJ       Total Minutes 40  -AJ                User Key  (r) = Recorded By, (t) = Taken By, (c) = Cosigned By      Initials Name Provider Type    AJ Kishore Magana PT DPT Physical Therapist                  Therapy Charges for Today       Code Description Service Date Service Provider Modifiers Qty    [id number] HC PT EVAL LOW COMPLEXITY 3 8/20/2024 Kishore Magana PT DPT GP 1            PT G-Codes  Outcome Measure Options: AM-PAC 6 Clicks Basic Mobility (PT)  AM-PAC 6 Clicks Score (PT): 20  PT Discharge Summary  Anticipated Discharge Disposition (PT): home with assist    Kishore Magana PT DPT  8/20/2024

## 2024-08-21 ENCOUNTER — TELEPHONE (OUTPATIENT)
Dept: NEUROLOGY | Age: 69
End: 2024-08-21

## 2024-08-21 VITALS
WEIGHT: 196.8 LBS | HEART RATE: 92 BPM | DIASTOLIC BLOOD PRESSURE: 62 MMHG | HEIGHT: 66 IN | TEMPERATURE: 98.4 F | OXYGEN SATURATION: 96 % | BODY MASS INDEX: 31.63 KG/M2 | RESPIRATION RATE: 18 BRPM | SYSTOLIC BLOOD PRESSURE: 150 MMHG

## 2024-08-21 LAB
ALBUMIN SERPL-MCNC: 3.5 G/DL (ref 3.5–5.2)
ALBUMIN/GLOB SERPL: 1.1 G/DL
ALP SERPL-CCNC: 72 U/L (ref 39–117)
ALT SERPL W P-5'-P-CCNC: 27 U/L (ref 1–41)
ANION GAP SERPL CALCULATED.3IONS-SCNC: 13 MMOL/L (ref 5–15)
AST SERPL-CCNC: 23 U/L (ref 1–40)
BILIRUB SERPL-MCNC: 0.6 MG/DL (ref 0–1.2)
BUN SERPL-MCNC: 9 MG/DL (ref 8–23)
BUN/CREAT SERPL: 11.3 (ref 7–25)
CALCIUM SPEC-SCNC: 8.7 MG/DL (ref 8.6–10.5)
CHLORIDE SERPL-SCNC: 102 MMOL/L (ref 98–107)
CO2 SERPL-SCNC: 24 MMOL/L (ref 22–29)
CREAT SERPL-MCNC: 0.8 MG/DL (ref 0.76–1.27)
DEPRECATED RDW RBC AUTO: 40.5 FL (ref 37–54)
EGFRCR SERPLBLD CKD-EPI 2021: 95.2 ML/MIN/1.73
ERYTHROCYTE [DISTWIDTH] IN BLOOD BY AUTOMATED COUNT: 12.1 % (ref 12.3–15.4)
GLOBULIN UR ELPH-MCNC: 3.2 GM/DL
GLUCOSE BLDC GLUCOMTR-MCNC: 164 MG/DL (ref 70–130)
GLUCOSE BLDC GLUCOMTR-MCNC: 234 MG/DL (ref 70–130)
GLUCOSE SERPL-MCNC: 168 MG/DL (ref 65–99)
HCT VFR BLD AUTO: 38.3 % (ref 37.5–51)
HGB BLD-MCNC: 12.6 G/DL (ref 13–17.7)
MCH RBC QN AUTO: 29.9 PG (ref 26.6–33)
MCHC RBC AUTO-ENTMCNC: 32.9 G/DL (ref 31.5–35.7)
MCV RBC AUTO: 91 FL (ref 79–97)
PLATELET # BLD AUTO: 147 10*3/MM3 (ref 140–450)
PMV BLD AUTO: 8.7 FL (ref 6–12)
POTASSIUM SERPL-SCNC: 3.6 MMOL/L (ref 3.5–5.2)
PROT SERPL-MCNC: 6.7 G/DL (ref 6–8.5)
QT INTERVAL: 332 MS
QTC INTERVAL: 423 MS
RBC # BLD AUTO: 4.21 10*6/MM3 (ref 4.14–5.8)
SODIUM SERPL-SCNC: 139 MMOL/L (ref 136–145)
VIT B12 BLD-MCNC: 261 PG/ML (ref 211–946)
WBC NRBC COR # BLD AUTO: 15.12 10*3/MM3 (ref 3.4–10.8)

## 2024-08-21 PROCEDURE — 93005 ELECTROCARDIOGRAM TRACING: CPT | Performed by: FAMILY MEDICINE

## 2024-08-21 PROCEDURE — 85027 COMPLETE CBC AUTOMATED: CPT | Performed by: FAMILY MEDICINE

## 2024-08-21 PROCEDURE — 63710000001 INSULIN LISPRO (HUMAN) PER 5 UNITS: Performed by: FAMILY MEDICINE

## 2024-08-21 PROCEDURE — 97530 THERAPEUTIC ACTIVITIES: CPT

## 2024-08-21 PROCEDURE — 80053 COMPREHEN METABOLIC PANEL: CPT | Performed by: FAMILY MEDICINE

## 2024-08-21 PROCEDURE — 25010000002 ENOXAPARIN PER 10 MG: Performed by: FAMILY MEDICINE

## 2024-08-21 PROCEDURE — 99233 SBSQ HOSP IP/OBS HIGH 50: CPT | Performed by: CLINICAL NURSE SPECIALIST

## 2024-08-21 PROCEDURE — 25010000002 ONDANSETRON PER 1 MG: Performed by: FAMILY MEDICINE

## 2024-08-21 PROCEDURE — 25010000002 CEFTRIAXONE PER 250 MG: Performed by: NURSE PRACTITIONER

## 2024-08-21 PROCEDURE — 82948 REAGENT STRIP/BLOOD GLUCOSE: CPT

## 2024-08-21 PROCEDURE — 97116 GAIT TRAINING THERAPY: CPT

## 2024-08-21 PROCEDURE — 97535 SELF CARE MNGMENT TRAINING: CPT

## 2024-08-21 RX ORDER — DONEPEZIL HYDROCHLORIDE 5 MG/1
5 TABLET, FILM COATED ORAL NIGHTLY
COMMUNITY

## 2024-08-21 RX ORDER — SUCRALFATE 1 G/1
1 TABLET ORAL 4 TIMES DAILY
COMMUNITY

## 2024-08-21 RX ORDER — CEFDINIR 300 MG/1
300 CAPSULE ORAL 2 TIMES DAILY
Qty: 10 CAPSULE | Refills: 0 | Status: SHIPPED | OUTPATIENT
Start: 2024-08-22 | End: 2024-08-27

## 2024-08-21 RX ADMIN — ATORVASTATIN CALCIUM 40 MG: 40 TABLET ORAL at 09:29

## 2024-08-21 RX ADMIN — ONDANSETRON 4 MG: 2 INJECTION INTRAMUSCULAR; INTRAVENOUS at 06:58

## 2024-08-21 RX ADMIN — PANTOPRAZOLE SODIUM 40 MG: 40 TABLET, DELAYED RELEASE ORAL at 09:29

## 2024-08-21 RX ADMIN — LOSARTAN POTASSIUM 50 MG: 50 TABLET, FILM COATED ORAL at 09:29

## 2024-08-21 RX ADMIN — SODIUM CHLORIDE 1000 MG: 900 INJECTION INTRAVENOUS at 11:42

## 2024-08-21 RX ADMIN — INSULIN LISPRO 3 UNITS: 100 INJECTION, SOLUTION INTRAVENOUS; SUBCUTANEOUS at 11:41

## 2024-08-21 RX ADMIN — ENOXAPARIN SODIUM 40 MG: 100 INJECTION SUBCUTANEOUS at 09:28

## 2024-08-21 RX ADMIN — Medication 10 ML: at 09:29

## 2024-08-21 RX ADMIN — ASPIRIN 81 MG: 81 TABLET, COATED ORAL at 09:29

## 2024-08-21 RX ADMIN — AMLODIPINE BESYLATE 5 MG: 5 TABLET ORAL at 09:29

## 2024-08-21 RX ADMIN — INSULIN LISPRO 2 UNITS: 100 INJECTION, SOLUTION INTRAVENOUS; SUBCUTANEOUS at 09:28

## 2024-08-21 NOTE — THERAPY TREATMENT NOTE
Acute Care - Occupational Therapy Treatment Note  [facility]     Patient Name: Ghulam Artis  : 1955  MRN: [id number]  Today's Date: 2024             Admit Date: 2024       ICD-10-CM ICD-9-CM   1. Altered mental status, unspecified altered mental status type  R41.82 780.97   2. Dysphagia, unspecified type  R13.10 787.20   3. Impaired mobility [Z74.09]  Z74.09 799.89     Patient Active Problem List   Diagnosis    Hx of adenomatous colonic polyps    Hemoptysis    Restrictive lung disease    MACRINA (obstructive sleep apnea)    Cigarette nicotine dependence without complication    Overweight    Bradycardia    Balance disorder    Right internal carotid occlusion    Primary hypertension     Past Medical History:   Diagnosis Date    GERD (gastroesophageal reflux disease)     Hyperlipidemia     Hypertension     Sleep apnea     cpap at hs     Past Surgical History:   Procedure Laterality Date    CATARACT EXTRACTION Bilateral     COLONOSCOPY N/A 2018    6 mm hyperplastic polyp was found at 20 cm proximal to the anus, tics sigmoid colon    COLONOSCOPY N/A 2024    Procedure: COLONOSCOPY WITH ANESTHESIA;  Surgeon: Zack Moser MD;  Location: [facility] ENDOSCOPY;  Service: Gastroenterology;  Laterality: N/A;  Pre: Hx of adenomatous colonic polyps;  Post: Diverticulosis, Polyps;  Daren Castillo MD    COLONOSCOPY W/ POLYPECTOMY  2013    TUBULAR ADENOMA POLYP AT 60 CM, HYPERPLASTIC POLYP AT 30 CM, DIVERTICULOSIS SIGMOID COLON, RECALL 5YRS    COLONOSCOPY W/ POLYPECTOMY  2008    HYPERPLASTIC POLYPS RECTUM, SCATTERED DIVERTICULA    ENDOSCOPY N/A 2024    Procedure: ESOPHAGOGASTRODUODENOSCOPY WITH ANESTHESIA;  Surgeon: Zack Moser MD;  Location: [facility] ENDOSCOPY;  Service: Gastroenterology;  Laterality: N/A;  Pre: Screen;  Post: Normal;  Daren Castillo MD         OT ASSESSMENT FLOWSHEET (Last 12 Hours)       OT Evaluation and Treatment       Row Name 24 1123                    OT Time and Intention    Subjective Information no complaints  -EC        Document Type therapy note (daily note)  -EC        Mode of Treatment occupational therapy  -EC           General Information    Patient Profile Reviewed yes  -EC        Existing Precautions/Restrictions fall  -EC           Pain Assessment    Pretreatment Pain Rating 0/10 - no pain  -EC        Posttreatment Pain Rating 0/10 - no pain  -EC        Pain Intervention(s) Repositioned;Ambulation/increased activity  -EC           Activities of Daily Living    BADL Assessment/Intervention grooming  -EC           Grooming Assessment/Training    [place] Level (Grooming) oral care regimen;contact guard assist  -EC        Oral Care teeth brushed - regular toothbrush  -EC        Position (Grooming) sink side;unsupported standing  -EC           Bed Mobility    Bed Mobility supine-sit;sit-supine  -EC        Supine-Sit [place] (Bed Mobility) standby assist  -EC        Sit-Supine [place] (Bed Mobility) standby assist  -EC        Assistive Device (Bed Mobility) head of bed elevated  -EC           Functional Mobility    Functional Mobility- Ind. Level contact guard assist  -EC        Functional Mobility- Comment amb in room & to BR  -EC           Transfer Assessment/Treatment    Transfers sit-stand transfer;stand-sit transfer  -EC           Sit-Stand Transfer    Sit-Stand [place] (Transfers) contact guard  -EC           Stand-Sit Transfer    Stand-Sit [place] (Transfers) contact guard  -EC           Balance    Balance Interventions dynamic reaching;occupation based/functional task;standing  -EC           Plan of Care Review    Plan of Care Reviewed With patient;family  -EC        Progress improving  -EC        Outcome Evaluation OT tx completed. Pt presents in fowlers with family at bedside, no complaints this AM. Pt performs sup>sit with SBA. He amb around the room with CGA retrieving items suctioned to wall outside of his STEVE.  Demos good balance while simulating dynamic standing ADLs. Pt stood sinkside to perform oral hygiene routine. Pt & wife both feel he has improved overall. Anticipate d/c home today.  -EC           Positioning and Restraints    Pre-Treatment Position in bed  -EC        Post Treatment Position bed  -EC        In Bed fowlers;call light within reach;encouraged to call for assist;with family/caregiver;with nsg  -EC                  User Key  (r) = Recorded By, (t) = Taken By, (c) = Cosigned By      Initials Name Effective Dates    EC Codie Solis, OTR/L 10/13/23 -                      Occupational Therapy Education       Title: PT OT SLP Therapies (Done)       Topic: Occupational Therapy (Done)       Point: ADL training (Done)       Description:   Instruct learner(s) on proper safety adaptation and remediation techniques during self care or transfers.   Instruct in proper use of assistive devices.                  Learning Progress Summary             Patient Acceptance, E, VU by EC at 8/21/2024 1537    Acceptance, E, VU by EC at 8/21/2024 1128    Acceptance, E,TB, VU by SM at 8/20/2024 1839    Acceptance, E, VU by MM at 8/20/2024 1544   Family Acceptance, E, VU by EC at 8/21/2024 1128    Acceptance, E, VU by MM at 8/20/2024 1544                         Point: Home exercise program (Done)       Description:   Instruct learner(s) on appropriate technique for monitoring, assisting and/or progressing therapeutic exercises/activities.                  Learning Progress Summary             Patient Acceptance, E, VU by EC at 8/21/2024 1537                         Point: Precautions (Done)       Description:   Instruct learner(s) on prescribed precautions during self-care and functional transfers.                  Learning Progress Summary             Patient Acceptance, E, VU by EC at 8/21/2024 1537    Acceptance, E, VU by EC at 8/21/2024 1128    Acceptance, E,TB, VU by SM at 8/20/2024 1839    Acceptance, E, VU by MM at  8/20/2024 1544   Family Acceptance, E, VU by EC at 8/21/2024 1128    Acceptance, E, VU by MM at 8/20/2024 1544                         Point: Body mechanics (Done)       Description:   Instruct learner(s) on proper positioning and spine alignment during self-care, functional mobility activities and/or exercises.                  Learning Progress Summary             Patient Acceptance, E, VU by EC at 8/21/2024 1537    Acceptance, E, VU by EC at 8/21/2024 1128    Acceptance, E,TB, VU by  at 8/20/2024 1839    Acceptance, E, VU by MM at 8/20/2024 1544   Family Acceptance, E, VU by EC at 8/21/2024 1128    Acceptance, E, VU by MM at 8/20/2024 1544                                         User Key       Initials Effective Dates Name Provider Type Discipline     07/11/23 -  Jaylen Ng, OTR/L Occupational Therapist OT     10/13/23 -  Codie Solis OTR/L Occupational Therapist OT     06/19/24 -  Binta Luke, RN Registered Nurse Nurse                      OT Recommendation and Plan     Plan of Care Review  Plan of Care Reviewed With: patient, family  Progress: improving  Outcome Evaluation: OT tx completed. Pt presents in fowlers with family at bedside, no complaints this AM. Pt performs sup>sit with SBA. He amb around the room with CGA retrieving items suctioned to wall outside of his STEVE. Demos good balance while simulating dynamic standing ADLs. Pt stood sinkside to perform oral hygiene routine. Pt & wife both feel he has improved overall. Anticipate d/c home today.  Plan of Care Reviewed With: patient, family  Outcome Evaluation: OT tx completed. Pt presents in fowlers with family at bedside, no complaints this AM. Pt performs sup>sit with SBA. He amb around the room with CGA retrieving items suctioned to wall outside of his STEVE. Demos good balance while simulating dynamic standing ADLs. Pt stood sinkside to perform oral hygiene routine. Pt & wife both feel he has improved overall. Anticipate d/c  home today.     Outcome Measures       Row Name 08/21/24 1500             How much help from another is currently needed...    Putting on and taking off regular lower body clothing? 3  -EC      Bathing (including washing, rinsing, and drying) 3  -EC      Toileting (which includes using toilet bed pan or urinal) 3  -EC      Putting on and taking off regular upper body clothing 4  -EC      Taking care of personal grooming (such as brushing teeth) 4  -EC      Eating meals 4  -EC      AM-PAC 6 Clicks Score (OT) 21  -EC         Functional Assessment    Outcome Measure Options AM-PAC 6 Clicks Daily Activity (OT)  -EC                User Key  (r) = Recorded By, (t) = Taken By, (c) = Cosigned By      Initials Name Provider Type    EC Codie Solis, OTR/L Occupational Therapist                    Time Calculation:    Time Calculation- OT       Row Name 08/21/24 1146             Time Calculation- OT    OT Start Time 1120  -EC      OT Stop Time 1144  -EC      OT Time Calculation (min) 24 min  -EC      Total Timed Code Minutes- OT 24 minute(s)  -EC      OT Received On 08/21/24  -EC         Timed Charges    08467 - OT Therapeutic Activity Minutes 10  -EC      84585 - OT Self Care/Mgmt Minutes 14  -EC         Total Minutes    Timed Charges Total Minutes 24  -EC       Total Minutes 24  -EC                User Key  (r) = Recorded By, (t) = Taken By, (c) = Cosigned By      Initials Name Provider Type    EC Codie Solis, OTR/L Occupational Therapist                  Therapy Charges for Today       Code Description Service Date Service Provider Modifiers Qty    06639164912 HC OT THERAPEUTIC ACT EA 15 MIN 8/21/2024 Codie Solis, OTR/L GO 1    19164820843 HC OT SELF CARE/MGMT/TRAIN EA 15 MIN 8/21/2024 Codie Solis, OTR/L GO 1                 Codie Solis OTR/L  8/21/2024

## 2024-08-21 NOTE — THERAPY DISCHARGE NOTE
Acute Care - Occupational Therapy Discharge Summary  [facility]     Patient Name: Ghulam Artis  : 1955  MRN: [id number]    Today's Date: 2024                 Admit Date: 2024        OT Recommendation and Plan    Visit Dx:    ICD-10-CM ICD-9-CM   1. Altered mental status, unspecified altered mental status type  R41.82 780.97   2. Dysphagia, unspecified type  R13.10 787.20   3. Impaired mobility [Z74.09]  Z74.09 799.89          Time Calculation- OT       Row Name 24 1146             Time Calculation- OT    OT Start Time 1120  -EC      OT Stop Time 1144  -EC      OT Time Calculation (min) 24 min  -EC      Total Timed Code Minutes- OT 24 minute(s)  -EC      OT Received On 24  -EC         Timed Charges    73560 - OT Therapeutic Activity Minutes 10  -EC      65537 - OT Self Care/Mgmt Minutes 14  -EC         Total Minutes    Timed Charges Total Minutes 24  -EC       Total Minutes 24  -EC                User Key  (r) = Recorded By, (t) = Taken By, (c) = Cosigned By      Initials Name Provider Type    Codie Dunbar, OTR/L Occupational Therapist                       OT Rehab Goals       Row Name 24 1400             Transfer Goal 1 (OT)    Activity/Assistive Device (Transfer Goal 1, OT) toilet;bed-to-chair/chair-to-bed;shower chair  -CS      Rena Lara Level/Cues Needed (Transfer Goal 1, OT) independent  -CS      Time Frame (Transfer Goal 1, OT) long term goal (LTG);by discharge  -CS      Progress/Outcome (Transfer Goal 1, OT) goal not met  -CS         Bathing Goal 1 (OT)    Activity/Device (Bathing Goal 1, OT) bathing skills, all;shower chair  -CS      Rena Lara Level/Cues Needed (Bathing Goal 1, OT) independent  -CS      Time Frame (Bathing Goal 1, OT) long term goal (LTG);by discharge  -CS      Progress/Outcomes (Bathing Goal 1, OT) goal not met  -CS         Dressing Goal 1 (OT)    Activity/Device (Dressing Goal 1, OT) dressing skills, all  -CS      Rena Lara/Cues Needed  (Dressing Goal 1, OT) independent  -CS      Time Frame (Dressing Goal 1, OT) long term goal (LTG);by discharge  -CS      Progress/Outcome (Dressing Goal 1, OT) goal not met  -CS         Problem Specific Goal 1 (OT)    Problem Specific Goal 1 (OT) Pt will independently implement one pain management technique to decrease pain and improve functional performance.  -CS      Time Frame (Problem Specific Goal 1, OT) long term goal (LTG);by discharge  -CS      Progress/Outcome (Problem Specific Goal 1, OT) goal not met  -CS                User Key  (r) = Recorded By, (t) = Taken By, (c) = Cosigned By      Initials Name Provider Type Discipline    CS Jil Aguiar OTR/L, LIZZY Occupational Therapist OT                        Timed Therapy Charges  Total Units: 2      Suggested Charges  Total Units: 2      Procedure Name Documented Minutes Units Code    HC OT SELF CARE/MGMT/TRAIN EA 15 MIN 14 1   07640 (CPT®)      HC OT THERAPEUTIC ACT EA 15 MIN 10 1   58800 (CPT®)                 Documented Minutes  Total Minutes: 24      Therapy Provided Minutes    84595 - OT Self Care/Mgmt Minutes 14    86770 - OT Therapeutic Activity Minutes 10                        OT Discharge Summary  Anticipated Discharge Disposition (OT): home with assist, home with home health, home with outpatient therapy services  Reason for Discharge: Discharge from facility  Outcomes Achieved: Refer to plan of care for updates on goals achieved  Discharge Destination: Home with assist      NILESH Talley/L, CNT  8/21/2024

## 2024-08-21 NOTE — THERAPY DISCHARGE NOTE
Acute Care - Physical Therapy Discharge Summary  [facility]       Patient Name: Ghulam Artis  : 1955  MRN: [id number]    Today's Date: 2024                 Admit Date: 2024      PT Recommendation and Plan    Visit Dx:    ICD-10-CM ICD-9-CM   1. Altered mental status, unspecified altered mental status type  R41.82 780.97   2. Dysphagia, unspecified type  R13.10 787.20   3. Impaired mobility [Z74.09]  Z74.09 799.89                PT Rehab Goals       Row Name 24 1400             Bed Mobility Goal 1 (PT)    Activity/Assistive Device (Bed Mobility Goal 1, PT) rolling to left;rolling to right;sit to supine;supine to sit  -AB      Nicholas Level/Cues Needed (Bed Mobility Goal 1, PT) independent  -AB      Time Frame (Bed Mobility Goal 1, PT) long term goal (LTG);10 days  -AB      Progress/Outcomes (Bed Mobility Goal 1, PT) goal not met  -AB         Transfer Goal 1 (PT)    Activity/Assistive Device (Transfer Goal 1, PT) sit-to-stand/stand-to-sit;bed-to-chair/chair-to-bed;toilet;walk-in shower;car transfer  -AB      Nicholas Level/Cues Needed (Transfer Goal 1, PT) independent  -AB      Time Frame (Transfer Goal 1, PT) long term goal (LTG);10 days  -AB      Progress/Outcome (Transfer Goal 1, PT) goal not met  -AB         Gait Training Goal 1 (PT)    Activity/Assistive Device (Gait Training Goal 1, PT) gait (walking locomotion);decrease asymmetrical patterns;decrease fall risk;diminish gait deviation;forward stepping;improve balance and speed;increase endurance/gait distance;increase energy conservation  -AB      Nicholas Level (Gait Training Goal 1, PT) standby assist  -AB      Distance (Gait Training Goal 1, PT) 250' with pain 3/10 or less  -AB      Time Frame (Gait Training Goal 1, PT) long term goal (LTG);10 days  -AB      Progress/Outcome (Gait Training Goal 1, PT) goal not met  -AB         Problem Specific Goal 1 (PT)    Problem Specific Goal 1 (PT) Pt will demo HEP independently  as needed to maintain functional strength and return to PLOF  -AB      Time Frame (Problem Specific Goal 1, PT) long-term goal (LTG);by discharge  -AB      Strategies/Barriers (Problem Specific Goal 1, PT) report pain less than 3/10  -AB      Progress/Outcome (Problem Specific Goal 1, PT) goal not met  -AB                User Key  (r) = Recorded By, (t) = Taken By, (c) = Cosigned By      Initials Name Provider Type Discipline    Eileen Alexander, PTA Physical Therapist Assistant PT                        PT Discharge Summary  Anticipated Discharge Disposition (PT): home with assist  Reason for Discharge: Discharge from facility  Outcomes Achieved: Refer to plan of care for updates on goals achieved  Discharge Destination: Home with assist      Eileen Mancuso PTA   8/21/2024

## 2024-08-21 NOTE — TELEPHONE ENCOUNTER
Called and left patient a VM to let him know when I have him scheduled an appointment with Dr. Chou.

## 2024-08-21 NOTE — PLAN OF CARE
Goal Outcome Evaluation:      Pt rested intermittent throughout shift.  A/o x 4 with confusion but easily reoriented.  Restless and impulsive at time.  Family at bedside most of night. Remains on RA.  SR on monitor throughout shift. Ext catheter in place.  See  other Nursing note for CP event. No complaints or concerns noted by staff or voiced by patient. Call light within reach and all needs met at this time

## 2024-08-21 NOTE — THERAPY TREATMENT NOTE
Acute Care - Physical Therapy Treatment Note  [facility]     Patient Name: Ghulam Artis  : 1955  MRN: [id number]  Today's Date: 2024      Visit Dx:     ICD-10-CM ICD-9-CM   1. Altered mental status, unspecified altered mental status type  R41.82 780.97   2. Dysphagia, unspecified type  R13.10 787.20   3. Impaired mobility [Z74.09]  Z74.09 799.89     Patient Active Problem List   Diagnosis    Hx of adenomatous colonic polyps    Hemoptysis    Restrictive lung disease    MACRINA (obstructive sleep apnea)    Cigarette nicotine dependence without complication    Overweight    Bradycardia    Balance disorder    Right internal carotid occlusion    Primary hypertension     Past Medical History:   Diagnosis Date    GERD (gastroesophageal reflux disease)     Hyperlipidemia     Hypertension     Sleep apnea     cpap at hs     Past Surgical History:   Procedure Laterality Date    CATARACT EXTRACTION Bilateral     COLONOSCOPY N/A 2018    6 mm hyperplastic polyp was found at 20 cm proximal to the anus, tics sigmoid colon    COLONOSCOPY N/A 2024    Procedure: COLONOSCOPY WITH ANESTHESIA;  Surgeon: Zack Moser MD;  Location: [facility] ENDOSCOPY;  Service: Gastroenterology;  Laterality: N/A;  Pre: Hx of adenomatous colonic polyps;  Post: Diverticulosis, Polyps;  Daren Castillo MD    COLONOSCOPY W/ POLYPECTOMY  2013    TUBULAR ADENOMA POLYP AT 60 CM, HYPERPLASTIC POLYP AT 30 CM, DIVERTICULOSIS SIGMOID COLON, RECALL 5YRS    COLONOSCOPY W/ POLYPECTOMY  2008    HYPERPLASTIC POLYPS RECTUM, SCATTERED DIVERTICULA    ENDOSCOPY N/A 2024    Procedure: ESOPHAGOGASTRODUODENOSCOPY WITH ANESTHESIA;  Surgeon: Zack Moser MD;  Location: [facility] ENDOSCOPY;  Service: Gastroenterology;  Laterality: N/A;  Pre: Screen;  Post: Normal;  Daren Castillo MD     PT Assessment (Last 12 Hours)       PT Evaluation and Treatment       Row Name 2413          Physical Therapy Time and Intention     Subjective Information no complaints  -TB     Document Type therapy note (daily note)  -TB     Mode of Treatment physical therapy  -TB       Row Name 08/21/24 0913          General Information    Existing Precautions/Restrictions fall  -TB       Row Name 08/21/24 0913          Bed Mobility    Bed Mobility scooting/bridging;supine-sit;sit-supine  -TB     Rolling Left [place] (Bed Mobility) standby assist  -TB     Scooting/Bridging [place] (Bed Mobility) standby assist  -TB     Supine-Sit [place] (Bed Mobility) standby assist  -TB     Assistive Device (Bed Mobility) head of bed elevated;bed rails  -TB       Row Name 08/21/24 0913          Transfers    Transfers sit-stand transfer;stand-sit transfer  -TB     Comment, (Transfers) x3  -TB       Row Name 08/21/24 0913          Sit-Stand Transfer    Sit-Stand [place] (Transfers) contact guard;verbal cues  -TB       Row Name 08/21/24 0913          Stand-Sit Transfer    Stand-Sit [place] (Transfers) contact guard;verbal cues  -TB       Row Name 08/21/24 0913          Gait/Stairs (Locomotion)    [place] Level (Gait) contact guard;verbal cues  -TB     Assistive Device (Gait) --  Used railing in the [place]  -TB     Distance in Feet (Gait) 150  -TB     Deviations/Abnormal Patterns (Gait) bilateral deviations;base of support, narrow;urmila decreased;gait speed decreased;stride length decreased  -TB     Bilateral Gait Deviations forward flexed posture  -TB       Row Name 08/21/24 0913          Balance    Balance Assessment sitting static balance;sitting dynamic balance  -TB     Static Sitting Balance supervision  -TB     Dynamic Sitting Balance supervision  -TB     Position, Sitting Balance sitting edge of bed  -TB       Row Name 08/21/24 0913          Positioning and Restraints    Pre-Treatment Position in bed  -TB     Post Treatment Position bed  -TB     In Bed fowlers;call light within reach;encouraged to call for assist;with family/caregiver;side  rails up x2  -TB               User Key  (r) = Recorded By, (t) = Taken By, (c) = Cosigned By      Initials Name Provider Type    TB Jihan Avendano, PTA Physical Therapist Assistant                    Physical Therapy Education       Title: PT OT SLP Therapies (Resolved)       Topic: Physical Therapy (Resolved)       Point: Mobility training (Resolved)       Learning Progress Summary             Patient Acceptance, E,TB, VU by  at 8/20/2024 1839    Acceptance, E, VU by  at 8/20/2024 1533    Comment: fall risk, role of PT, beginning HEP                         Point: Home exercise program (Resolved)       Learning Progress Summary             Patient Acceptance, E,TB, VU by  at 8/20/2024 1839    Acceptance, E, VU by  at 8/20/2024 1533    Comment: fall risk, role of PT, beginning HEP                         Point: Body mechanics (Resolved)       Learning Progress Summary             Patient Acceptance, E,TB, VU by  at 8/20/2024 1839    Acceptance, E, VU by  at 8/20/2024 1533    Comment: fall risk, role of PT, beginning HEP                         Point: Precautions (Resolved)       Learning Progress Summary             Patient Acceptance, E,TB, VU by  at 8/20/2024 1839    Acceptance, E, VU by  at 8/20/2024 1533    Comment: fall risk, role of PT, beginning HEP                                         User Key       Initials Effective Dates Name Provider Type Discipline     08/15/24 -  Kishore Magana, PT DPT Physical Therapist PT     06/19/24 -  Binta Luke, RN Registered Nurse Nurse                  PT Recommendation and Plan             Time Calculation:    PT Charges       Row Name 08/21/24 1403             Time Calculation    Start Time 0913  -TB      Stop Time 0955  -TB      Time Calculation (min) 42 min  -TB      PT Received On 08/21/24  -TB         Time Calculation- PT    Total Timed Code Minutes- PT 42 minute(s)  -TB                User Key  (r) = Recorded By, (t) = Taken By, (c)  = Cosigned By      Initials Name Provider Type    TB Jihan Avendano, RONAN Physical Therapist Assistant                  Therapy Charges for Today       Code Description Service Date Service Provider Modifiers Qty    85760177849 HC GAIT TRAINING EA 15 MIN 8/21/2024 Jihan Avendano PTA GP 2    32895766154 HC PT THERAPEUTIC ACT EA 15 MIN 8/21/2024 Jihan Avendano PTA GP 1            PT G-Codes  Outcome Measure Options: AM-PAC 6 Clicks Daily Activity (OT)  AM-PAC 6 Clicks Score (PT): 20  AM-PAC 6 Clicks Score (OT): 21    Jihan Avendano PTA  8/21/2024

## 2024-08-21 NOTE — NURSING NOTE
At approx 0000 pt wife notified nurse that patient was complaining of Chest pain.  After speaking with patient about Chest pain and symptoms he was feeling EKG was performed.  See EKG tab for strip.  No abnormalities noted, no depressions, elevation, or inversion noted in any leads.

## 2024-08-21 NOTE — TELEPHONE ENCOUNTER
Psychiatric called to schedule a 4 wk HFU with dr Ashley. [facility] was unable to accommodate. Patient is being discharged today 08/21/24. Please contact patient with the appointment.

## 2024-08-21 NOTE — DISCHARGE SUMMARY
[facility]  DISCHARGE SUMMARY       Date of Admission: 8/20/2024  Date of Discharge:  8/21/2024  Primary Care Physician: Daren Castillo MD    Presenting Problem/History of Present Illness:  altered mentation after awakening     Final Discharge Diagnoses:  Active Hospital Problems    Diagnosis     **Balance disorder     Right internal carotid occlusion     Primary hypertension     MACRINA (obstructive sleep apnea)        Consults: Dr. Goddard with neurology.    Procedures Performed: none.    Pertinent Test Results:   Results for orders placed during the hospital encounter of 08/20/24    Adult Transthoracic Echo Complete W/ Cont if Necessary Per Protocol (With Agitated Saline)    Interpretation Summary    Left ventricular systolic function is normal. Left ventricular ejection fraction appears to be 56 - 60%.    Normal right ventricular cavity size and systolic function noted.    No evidence of a patent foramen ovale.    No significant valvular abnormalities identified on this study.      Imaging Results (All)       Procedure Component Value Units Date/Time    MRI Brain Without Contrast [[id number]] Collected: 08/20/24 1941     Updated: 08/20/24 1946    Narrative:      EXAMINATION: MRI BRAIN WO CONTRAST-     8/20/2024 4:00 PM     HISTORY: Stroke work up; R41.82-Altered mental status, unspecified;  R13.10-Dysphagia, unspecified; Z74.09-Other reduced mobility     Noncontrast MR imaging of the brain.  Axial and sagittal sequences.     No acute ischemia based on the DWI sequence.     Mild small vessel disease within the periventricular white matter.  No brain hemorrhage.     Mild cortical atrophy.     Ventricle size is appropriate.     The paranasal sinuses are clear.       Impression:      1. Mild atrophy and small vessel disease.  2. No acute intracranial abnormality.  3. No acute infarct.           This report was signed and finalized on 8/20/2024 7:43 PM by   Clifford Beauchamp MD.       CT Angiogram Neck [[id number]] Collected: 08/20/24 0703     Updated: 08/20/24 0739    Narrative:      EXAMINATION: CT ANGIOGRAM NECK-      8/19/2024 11:19 PM     HISTORY: stroke; R41.82-Altered mental status, unspecified     In order to have a CT radiation dose as low as reasonably achievable  Automated Exposure Control was utilized for adjustment of the mA and/or  KV according to patient size.     Total DLP = 382.25 mGy.cm     CT angiography of the neck is performed after intravenous contrast  enhancement.     Images are acquired in axial plane and subsequent reconstruction in  coronal and sagittal planes.     There is no previous similar study for comparison.     Atheromatous changes of the limited visualized thoracic aortic arch. No  aneurysmal dilatation.     There is a common origin of the brachiocephalic and left common carotid  artery from the aortic arch. It divides into normal appearing  brachiocephalic trunk and the left common carotid artery.     There is normal origin course and caliber of the left subclavian artery.     The left vertebral artery arises directly from the aortic arch adjacent  and proximal to the origin of the left subclavian artery.     Both common carotid arteries have a normal course and caliber throughout  the neck.     There is complete occlusion of the right internal carotid artery at the  level of origin/bifurcation of the right distal common carotid artery.  No reopacification/reconstitution of the right internal carotid artery  in the neck.     There is a large calcific plaque in the left distal common carotid  artery. No significant stenosis. It subsequently divides into a  normal-appearing left internal carotid artery and significantly stenotic  origin of the left external carotid artery. There is normal course of  the left internal carotid artery in the neck.     A dominant left vertebral artery arises directly from the aortic arch.  Relatively smaller  right vertebral artery arises from the right  subclavian artery. There is subsequent normal course throughout the  neck. No areas of focal stenosis or aneurysmal dilatation. Relatively  small right and a dominant left vertebral artery enter the foramen  magnum. The subsequent courses reviewed and reported in CT angiography  of the head.     Limited visualized soft tissues of the neck are unremarkable.     There is elongation of the styloid processes bilaterally due to partial  calcification of the stylohyoid ligaments bilaterally. No significant  compression on the adjacent collateral vessels.     The limited visualized lungs are unremarkable.     Images reviewed in bone windows show significant cervical spondylosis  more pronounced in the mid to lower cervical spine.       Impression:      1. Complete occlusion of the right internal carotid artery at the  origin/common carotid bifurcation. No reconstitution in the neck.  2. Atheromatous changes of the common and internal carotid arteries  bilaterally. No significant stenosis on the left.  3. A dominant left vertebral artery arises directly from the aortic  arch.  4. The NASCET criteria was utilized for estimation of carotid arterial  stenosis.     The above study was initially reviewed and reported by [employer]. I do not  find any discrepancies.                                      This report was signed and finalized on 8/20/2024 7:36 AM by Dr. Pascual Woodson MD.       CT Angiogram Head w AI Analysis of LVO [[id number]] Collected: 08/20/24 0610     Updated: 08/20/24 0722    Narrative:      EXAMINATION: CT ANGIOGRAM HEAD W AI ANALYSIS OF LVO-     8/19/2024 11:19 PM     HISTORY: stroke; R41.82-Altered mental status, unspecified     CT angiography of the head is performed after intravenous contrast  enhancement.     The images are acquired in axial plane with subsequent reconstruction in  coronal and sagittal planes.     There is no previous similar study for  comparison. Correlation made with  CT scan of the head obtained earlier today.     There a faint opacification of the right internal carotid artery in the  distal part of the right carotid canal. Poor opacification of the  heavily calcified, atheromatous, right internal carotid artery and the  right cavernous sinus. There is opacification of the normal size  suprasellar right internal carotid artery with a subsequent bifurcation  into anterior and middle cerebral arteries. There are normal insular,  opercular and cortical branches of the right middle cerebral artery.     Heavily atheromatous left internal carotid artery is seen in the left  left cavernous sinus. No significant stenosis. Normal size left  suprasellar internal carotid artery is seen dividing into  normal-appearing anterior and middle cerebral arteries. There is  subsequent normal insular, opercular and cortical branches of the left  middle cerebral artery.     A relatively small right and a dominant left vertebral artery enter the  foramen magnum and subsequently joint to make a normal size basilar  artery. The basilar artery continues as the right PCA. The P1 segment of  the left PCA is absent. The P2 and P3 segments of the left PCA are made  by a dominant left posterior communicating artery. There normal  branches. No areas of focal stenosis or aneurysmal dilatation.       Impression:      1. The heavily atheromatous internal carotid arteries in the carotid  canal and cavernous sinus bilaterally.  2. Poor reopacification of the distal right internal carotid artery in  the distal carotid canal and right cavernous sinus from the left  internal carotid artery through the anterior communicating branch. The  remaining intracranial circulation is unremarkable without any area of  flow-limiting stenosis or aneurysmal dilatation.  3. A fetal-type left posterior circulation.  4. The NASCET criteria were utilized for estimation of carotid  arterial  stenosis.     The above study was initially reviewed and reported by [employer]. I do not  find any discrepancies.     This report was signed and finalized on 8/20/2024 7:18 AM by Dr. Pascual Woodson MD.       CT CEREBRAL PERFUSION WITH & WITHOUT CONTRAST [[id number]] Collected: 08/20/24 0636     Updated: 08/20/24 0651    Narrative:      EXAMINATION: CT CEREBRAL PERFUSION W WO CONTRAST-  8/20/2024 6:37 AM     Indication: Acute ischemic stroke suspected     Exam:      1. Perfusion CT is performed to acquire images tracking the temporal  course of iodinated contrast material passing through the cerebral  circulation. Perfusion parameters, such as cerebral blood flow (CBF),  cerebral blood volume (CBV), mean transit time (MTT), etc. are  calculated by [employer] with additional provided perfusion maps and  estimated stroke volumes.  2. Automated exposure control (AEC) protocols are utilized on the  scanner to ensure dose lowered technique.      Comparison: Noncontrast CT brain and brain angiogram dated 8/19/2024  11:31 PM     Findings:     Abnormal perfusion exam, with size and distribution of the perfusion  abnormality described below.     Distribution: Perfusion abnormality involves the Right MCA and PCA  vascular distribution.     Tmax >6.0 seconds volume: 0 ml     CBF < 30% volume: 0 ml     Mismatch volume: 0 ml      Mismatch ratio: None       Impression:      Impression:  1. Today's exam demonstrates no evidence of a core infarct with no  volume of brain demonstrating a Tmax greater than 6.0 seconds or a CBF  of less than 30%. However, there is noted to be relative hypoperfusion  involving both the right MCA and PCA distributions with a Tmax of  greater than 4.0 seconds in both of these distributions on mapping. This  is substantiated on the rapid angio rendering MIPS with relative  diminished density of contrast within the right MCA distribution as well  as irregularity and diminished density of enhancement  within the right  posterior cerebral artery distribution. Correlation with the CT  angiogram demonstrates that the cervical segment of the right internal  carotid artery is occluded. Flow to the right MCA distribution is  related to reconstitution from the contralateral hemisphere resulting in  a delay in transit time. There is also disease within the P1 segment of  the right posterior cerebral artery with diminished Tmax and a delayed  transit time into the right hemisphere in the PCA distribution.  2. The emergency room was notified of the findings     This report was signed and finalized on 8/20/2024 6:48 AM by Dr. Dharmesh Gruber MD.       XR Chest 1 View [[id number]] Collected: 08/20/24 0625     Updated: 08/20/24 0628    Narrative:      EXAMINATION: XR CHEST 1 VW-  8/20/2024 6:25 AM     HISTORY: Weakness.     FINDINGS: Upright frontal projection of the chest demonstrates no  evidence of acute cardiopulmonary disease. There is no effusion or free  air present.       Impression:      1. No acute disease.     This report was signed and finalized on 8/20/2024 6:25 AM by Dr. Dharmesh Gruber MD.       CT Head Without Contrast Stroke Protocol [[id number]] Collected: 08/20/24 0528     Updated: 08/20/24 0536    Narrative:      EXAMINATION: CT HEAD WO CONTRAST STROKE PROTOCOL-      8/19/2024 11:19 PM     HISTORY: stroke; R41.82-Altered mental status, unspecified     In order to have a CT radiation dose as low as reasonably achievable  Automated Exposure Control was utilized for adjustment of the mA and/or  KV according to patient size.     Total DLP = 834.16 mGy.cm     The CT scan of the head is performed without intravenous contrast  enhancement.     The images are acquired in axial plane and subsequent reconstruction in  coronal and sagittal planes.     The comparison is made with the previous study dated 8/27/2016.     There is no evidence of a mass. There is no midline shift.     There is no evidence of  intracranial hemorrhage or hematoma.     Moderately dilated ventricles, basal cisterns and cortical sulci  suggestive moderate chronic volume loss. This moderately more  progressive since the previous study.     Scattered areas of chronic white matter ischemia bilaterally are noted.  The gray-white matter differentiation is maintained.     Posterior fossa structures as visualized appear unremarkable.     Images reviewed in bone window show no evidence of an acute skull  fracture. Mild mucosal thickening of the ethmoid sinuses are noted.  Mastoid air cells are clear.       Impression:      1. No acute intracranial abnormality.     The above study was initially reviewed and reported by [employer]. I do not  find any discrepancies.                                      This report was signed and finalized on 8/20/2024 5:33 AM by Dr. Pascual Woodson MD.             LAB RESULTS:      Lab 08/21/24 0247 08/20/24 0553 08/20/24 0008   WBC 15.12* 12.84* 12.41*   HEMOGLOBIN 12.6* 13.6 13.9   HEMATOCRIT 38.3 41.5 41.5   PLATELETS 147 164 169   NEUTROS ABS  --   --  10.28*   IMMATURE GRANS (ABS)  --   --  0.19*   LYMPHS ABS  --   --  1.45   MONOS ABS  --   --  0.42   EOS ABS  --   --  0.02   MCV 91.0 92.0 91.0   PROTIME  --   --  14.9*   APTT  --   --  31.5         Lab 08/21/24 0247 08/20/24  0553 08/20/24  0008   SODIUM 139 140 137   POTASSIUM 3.6 3.8 4.0   CHLORIDE 102 101 99   CO2 24.0 28.0 24.0   ANION GAP 13.0 11.0 14.0   BUN 9 8 9   CREATININE 0.80 0.82 0.78   EGFR 95.2 94.5 95.9   GLUCOSE 168* 172* 154*   CALCIUM 8.7 9.1 9.2   HEMOGLOBIN A1C  --  6.20*  --    TSH  --  2.490  --          Lab 08/21/24 0247 08/20/24  0553 08/20/24  0008   TOTAL PROTEIN 6.7 6.7 6.9   ALBUMIN 3.5 3.5 3.7   GLOBULIN 3.2 3.2 3.2   ALT (SGPT) 27 25 29   AST (SGOT) 23 15 21   BILIRUBIN 0.6 0.7 0.8   ALK PHOS 72 61 67         Lab 08/20/24  0008   PROTIME 14.9*   INR 1.12*         Lab 08/20/24  0553   CHOLESTEROL 120   LDL CHOL 65   HDL CHOL 35*  "  TRIGLYCERIDES 108         Lab 08/20/24  0008   ABO TYPING A   RH TYPING Positive   ANTIBODY SCREEN Negative         Brief Urine Lab Results  (Last result in the past 365 days)        Color   Clarity   Blood   Leuk Est   Nitrite   Protein   CREAT   Urine HCG        08/20/24 1207 Yellow   Cloudy   Trace   Moderate (2+)   Negative   100 mg/dL (2+)                 Microbiology Results (last 10 days)       Procedure Component Value - Date/Time    Urine Culture - Urine, Urine, Clean Catch [[id number]]  (Normal) Collected: 08/20/24 1207    Lab Status: Preliminary result Specimen: Urine, Clean Catch Updated: 08/21/24 1109     Urine Culture Culture in progress            Hospital Course:   Mr. Artis is a 68-year-old male who presented to [facility] on 8/20 with altered mentation after awakening.  He follows with Dr. Lopez and [facility] - treated for amnestic mild cognitive impairment. He has been tried on Aricept and Namenda in the past. He has a history of obstructive sleep apnea but is noncompliant with his CPAP.  He does take aspirin 81 mg and Zocor 20 mg as outpatient.       Reportedly for the last month when patient wakes up he \"does not feel right.\"  As the day goes on he feels better.  His wife does report that he snores when he sleeps.  Around 10 AM on 8/19 patient and spouse took their dog to the groomer.  When they returned around noon he took a nap.  When he woke up per wife was somewhat \"out of it.\" No seizure activity was seen. He had no unilateral weakness or numbness.     In the ED, CT head showed no acute findings. CT angiography shows occlusion of the right internal carotid artery just after bifurcation. There is also heavy plaque burden in the intracranial arteries.  Chest x-ray shows no acute findings.     Neurology, Dr. Goddard consulted.  MRI brain and EEG unremarkable.  Neurology feels sleep-disordered with altered cognition secondary to likely prolonged hypoxia overnight.  " "Strongly recommend treatment of obstructive sleep apnea as an outpatient which can include different mask or potentially an inspire device.  Continue aspirin 81 mg, Zocor 20 mg with an LDL goal less than 70.  He is okay for discharge from neurology standpoint with follow up with Dr. Najera, his primary neurologist, in 4-6 weeks.    Results for orders placed during the hospital encounter of 08/20/24    Adult Transthoracic Echo Complete W/ Cont if Necessary Per Protocol (With Agitated Saline)    Interpretation Summary    Left ventricular systolic function is normal. Left ventricular ejection fraction appears to be 56 - 60%.    Normal right ventricular cavity size and systolic function noted.    No evidence of a patent foramen ovale.    No significant valvular abnormalities identified on this study.     Patient complains of dysuria and urgency.  Urinalysis consistent with UTI.  Will follow urine culture until completion.  He completed 2 days of Rocephin, will transition to Omnicef     PT/OT.  Therapy recommends discharge home with assist.     He has reached maximum benefit of hospitalization.  He is medically stable and appropriate for discharge today.    Physical Exam on Discharge:  /62 (BP Location: Left arm, Patient Position: Lying)   Pulse 92   Temp 98.4 °F (36.9 °C) (Oral)   Resp 18   Ht 167.6 cm (65.98\")   Wt 89.3 kg (196 lb 12.8 oz)   SpO2 96%   BMI 31.78 kg/m²   Physical Exam  Vitals reviewed.   Constitutional:       General: He is not in acute distress.     Appearance: He is obese. He is not toxic-appearing.      Comments: Sitting up in bed.  No acute distress.  On room air.  Spouse at bedside.   HENT:      Head: Normocephalic and atraumatic.      Mouth/Throat:      Mouth: Mucous membranes are moist.      Pharynx: Oropharynx is clear.   Eyes:      Extraocular Movements: Extraocular movements intact.      Conjunctiva/sclera: Conjunctivae normal.      Pupils: Pupils are equal, round, and reactive to " light.   Cardiovascular:      Rate and Rhythm: Normal rate and regular rhythm.      Pulses: Normal pulses.   Pulmonary:      Effort: Pulmonary effort is normal. No respiratory distress.      Breath sounds: Normal breath sounds. No wheezing.   Abdominal:      General: Bowel sounds are normal. There is no distension.      Palpations: Abdomen is soft.      Tenderness: There is no abdominal tenderness.   Musculoskeletal:         General: No swelling or tenderness. Normal range of motion.      Cervical back: Normal range of motion and neck supple. No muscular tenderness.   Skin:     General: Skin is warm and dry.      Findings: No erythema or rash.   Neurological:      General: No focal deficit present.      Mental Status: He is alert and oriented to person, place, and time. Mental status is at baseline.      Cranial Nerves: No cranial nerve deficit.      Motor: No weakness.   Psychiatric:         Mood and Affect: Mood normal.         Behavior: Behavior normal.       Condition on Discharge: medically stable.    Discharge Disposition:  Home-Health Care [facility] [place]    Discharge Medications:     Discharge Medications        New Medications        Instructions Start Date   cefdinir 300 MG capsule  Commonly known as: OMNICEF   300 mg, Oral, 2 Times Daily   Start Date: August 22, 2024            Continue These Medications        Instructions Start Date   amLODIPine 5 MG tablet  Commonly known as: NORVASC   5 mg, Oral, Daily      aspirin 81 MG EC tablet   1 tablet, Oral, Daily      donepezil 5 MG tablet  Commonly known as: ARICEPT   5 mg, Oral, Nightly      glimepiride 4 MG tablet  Commonly known as: AMARYL   4 mg, Oral, 2 Times Daily      HYDROcodone-acetaminophen 5-325 MG per tablet  Commonly known as: NORCO   1 tablet, Oral, Every 6 Hours PRN      metFORMIN 500 MG tablet  Commonly known as: GLUCOPHAGE   1,000 mg, Oral, 2 Times Daily With Meals   Start Date: August 22, 2024     Mounjaro 2.5 MG/0.5ML solution pen-injector pen  Generic  drug: Tirzepatide   2.5 mL, Subcutaneous, Weekly      olmesartan 40 MG tablet  Commonly known as: BENICAR   40 mg, Oral, Daily      pantoprazole 40 MG EC tablet  Commonly known as: PROTONIX   40 mg, Oral, Daily      simvastatin 20 MG tablet  Commonly known as: ZOCOR   20 mg, Oral, Nightly      sucralfate 1 g tablet  Commonly known as: CARAFATE   1 g, Oral, 4 Times Daily               Discharge Diet:   Diet Instructions       Diet: Regular/House Diet; Regular (IDDSI 7); Thin (IDDSI 0)      Discharge Diet: Regular/House Diet    Texture: Regular (IDDSI 7)    Fluid Consistency: Thin (IDDSI 0)            Activity at Discharge:   Activity Instructions       Activity as Tolerated              Follow-up Appointments:   1.  Follow-up with Dr. Castillo in 1 week.  2.  Follow up with Dr. Najera, his primary neurologist, in 4-6 weeks.    Test Results Pending at Discharge: Will follow urine culture until completion.    Electronically signed by LUDIVINA Valdez, 08/21/24, 11:37 CDT.    Time: 35 minutes.

## 2024-08-21 NOTE — PLAN OF CARE
Goal Outcome Evaluation:  Plan of Care Reviewed With: patient, family        Progress: improving  Outcome Evaluation: OT tx completed. Pt presents in fowlers with family at bedside, no complaints this AM. Pt performs sup>sit with SBA. He amb around the room with CGA retrieving items suctioned to wall outside of his STEVE. Demos good balance while simulating dynamic standing ADLs. Pt stood sinkside to perform oral hygiene routine. Pt & wife both feel he has improved overall. Anticipate d/c home today.

## 2024-08-23 LAB
BACTERIA SPEC AEROBE CULT: ABNORMAL
BACTERIA SPEC AEROBE CULT: ABNORMAL

## 2024-08-29 LAB
QT INTERVAL: 298 MS
QT INTERVAL: 332 MS
QTC INTERVAL: 406 MS
QTC INTERVAL: 423 MS

## 2024-08-30 ENCOUNTER — NURSE TRIAGE (OUTPATIENT)
Dept: CALL CENTER | Facility: HOSPITAL | Age: 69
End: 2024-08-30
Payer: MEDICARE

## 2024-08-30 NOTE — TELEPHONE ENCOUNTER
"Wife is calling concerned that her  has blood in his urine. He was recently released from the hospital after being diagnosed with a UTI and received antibiotics in the hospital. She reported that he has a small stream of blood when urinating, no burning, cramping, or back pain.   He is not on blood thinners and did not have a catheter in the hospital.     The wife's name is Saskia and the number is [phone].      I called the on-call provider, Larry FLORENCE and explained the situation. He suggested that the patient go to the ER for evaluation, especially since he wont be able to get into the office during the long holiday weekend.     I called Saskia back and let her know that Larry suggested the ER. She said that she will take him now.   Reason for Disposition   [1] Pain or burning with passing urine AND [2] side (flank) or back pain present    Additional Information   Negative: Shock suspected (e.g., cold/pale/clammy skin, too weak to stand, low BP, rapid pulse)   Negative: Sounds like a life-threatening emergency to the triager   Negative: Urinary catheter, questions about   Negative: Urinary catheter and spinal cord injury, questions about   Negative: Hospice patient with urinary catheter   Negative: Recent back or abdominal injury   Negative: Recent genital injury   Negative: [1] Unable to urinate (or only a few drops) > 4 hours AND [2] bladder feels very full (e.g., palpable bladder or strong urge to urinate)   Negative: [1] Diffuse (all over) muscle pains in the shoulders, arms, legs, and back AND [2] dark (cola or tea-colored) or red-colored urine   Negative: Passing pure blood or large blood clots (i.e., size > a dime)  (Exception: Brittany or small strands.)   Negative: Fever > 100.4 F (38.0 C)   Negative: Patient sounds very sick or weak to the triager    Answer Assessment - Initial Assessment Questions  1. COLOR of URINE: \"Describe the color of the urine.\"  (e.g., tea-colored, pink, red, bloody) " "\"Do you have blood clots in your urine?\" (e.g., none, pea, grape, small coin)      Red stream of blood in urine  2. ONSET: \"When did the bleeding start?\"       now  3. EPISODES: \"How many times has there been blood in the urine?\" or \"How many times today?\"      one  4. PAIN with URINATION: \"Is there any pain with passing your urine?\" If Yes, ask: \"How bad is the pain?\"  (Scale 1-10; or mild, moderate, severe)     - MILD: Complains slightly about urination hurting.     - MODERATE: Interferes with normal activities.       - SEVERE: Excruciating, unwilling or unable to urinate because of the pain.   no  5. FEVER: \"Do you have a fever?\" If Yes, ask: \"What is your temperature, how was it measured, and when did it start?\"      No   6. ASSOCIATED SYMPTOMS: \"Are you passing urine more frequently than usual?\"      No   7. OTHER SYMPTOMS: \"Do you have any other symptoms?\" (e.g., back/flank pain, abdomen pain, vomiting)     No   8. PREGNANCY: \"Is there any chance you are pregnant?\" \"When was your last menstrual period?\"      No    Protocols used: Urine - Blood In-ADULT-    "

## 2024-09-25 ENCOUNTER — OFFICE VISIT (OUTPATIENT)
Dept: NEUROLOGY | Age: 69
End: 2024-09-25
Payer: MEDICARE

## 2024-09-25 VITALS
DIASTOLIC BLOOD PRESSURE: 72 MMHG | SYSTOLIC BLOOD PRESSURE: 130 MMHG | WEIGHT: 199 LBS | RESPIRATION RATE: 16 BRPM | BODY MASS INDEX: 30.16 KG/M2 | OXYGEN SATURATION: 99 % | HEIGHT: 68 IN | HEART RATE: 61 BPM

## 2024-09-25 DIAGNOSIS — R41.3 MEMORY LOSS: Primary | ICD-10-CM

## 2024-09-25 DIAGNOSIS — Z86.39 HISTORY OF DIABETES MELLITUS: ICD-10-CM

## 2024-09-25 DIAGNOSIS — I65.21 CAROTID OCCLUSION, RIGHT: ICD-10-CM

## 2024-09-25 PROCEDURE — 1123F ACP DISCUSS/DSCN MKR DOCD: CPT | Performed by: PSYCHIATRY & NEUROLOGY

## 2024-09-25 PROCEDURE — 99214 OFFICE O/P EST MOD 30 MIN: CPT | Performed by: PSYCHIATRY & NEUROLOGY

## 2024-09-25 PROCEDURE — G8417 CALC BMI ABV UP PARAM F/U: HCPCS | Performed by: PSYCHIATRY & NEUROLOGY

## 2024-09-25 PROCEDURE — G8427 DOCREV CUR MEDS BY ELIG CLIN: HCPCS | Performed by: PSYCHIATRY & NEUROLOGY

## 2024-09-25 PROCEDURE — 1036F TOBACCO NON-USER: CPT | Performed by: PSYCHIATRY & NEUROLOGY

## 2024-09-25 PROCEDURE — 3017F COLORECTAL CA SCREEN DOC REV: CPT | Performed by: PSYCHIATRY & NEUROLOGY

## 2024-09-28 ENCOUNTER — NURSE TRIAGE (OUTPATIENT)
Dept: CALL CENTER | Facility: HOSPITAL | Age: 69
End: 2024-09-28
Payer: MEDICARE

## 2024-09-28 RX ORDER — FLUCONAZOLE 200 MG/1
200 TABLET ORAL DAILY
Qty: 7 TABLET | Refills: 0 | Status: SHIPPED | OUTPATIENT
Start: 2024-09-28 | End: 2024-10-05

## 2024-12-04 ENCOUNTER — OFFICE VISIT (OUTPATIENT)
Dept: NEUROLOGY | Age: 69
End: 2024-12-04
Payer: MEDICARE

## 2024-12-04 VITALS
HEART RATE: 74 BPM | RESPIRATION RATE: 16 BRPM | BODY MASS INDEX: 30.16 KG/M2 | HEIGHT: 68 IN | OXYGEN SATURATION: 97 % | DIASTOLIC BLOOD PRESSURE: 74 MMHG | SYSTOLIC BLOOD PRESSURE: 130 MMHG | WEIGHT: 199 LBS

## 2024-12-04 DIAGNOSIS — R41.3 MEMORY LOSS: Primary | ICD-10-CM

## 2024-12-04 DIAGNOSIS — Z86.39 HISTORY OF HYPERLIPIDEMIA: ICD-10-CM

## 2024-12-04 DIAGNOSIS — Z86.39 HISTORY OF DIABETES MELLITUS: ICD-10-CM

## 2024-12-04 DIAGNOSIS — I65.21 CAROTID OCCLUSION, RIGHT: ICD-10-CM

## 2024-12-04 PROCEDURE — 1036F TOBACCO NON-USER: CPT | Performed by: PSYCHIATRY & NEUROLOGY

## 2024-12-04 PROCEDURE — G8484 FLU IMMUNIZE NO ADMIN: HCPCS | Performed by: PSYCHIATRY & NEUROLOGY

## 2024-12-04 PROCEDURE — 99213 OFFICE O/P EST LOW 20 MIN: CPT | Performed by: PSYCHIATRY & NEUROLOGY

## 2024-12-04 PROCEDURE — 1123F ACP DISCUSS/DSCN MKR DOCD: CPT | Performed by: PSYCHIATRY & NEUROLOGY

## 2024-12-04 PROCEDURE — 1159F MED LIST DOCD IN RCRD: CPT | Performed by: PSYCHIATRY & NEUROLOGY

## 2024-12-04 PROCEDURE — G8427 DOCREV CUR MEDS BY ELIG CLIN: HCPCS | Performed by: PSYCHIATRY & NEUROLOGY

## 2024-12-04 PROCEDURE — G8417 CALC BMI ABV UP PARAM F/U: HCPCS | Performed by: PSYCHIATRY & NEUROLOGY

## 2024-12-04 PROCEDURE — 3017F COLORECTAL CA SCREEN DOC REV: CPT | Performed by: PSYCHIATRY & NEUROLOGY

## 2024-12-04 NOTE — PROGRESS NOTES
REVIEW OF SYSTEMS    Constitutional: []Fever []Sweat []Chills [] Recent Injury [x] Denies all unless marked  HEENT:[]Headache  [] Head Injury/Hearing Loss  [] Sore Throat  [] Ear Ache/Dizziness  [x] Denies all unless marked  Spine:  [] Neck pain  [] Back pain  [] Sciaticia  [x] Denies all unless marked  Cardiovascular:[]Heart Disease []Chest Pain [] Palpitations  [x] Denies all unless marked  Pulmonary: []Shortness of Breath []Cough   [x] Denies all unless marke  Gastrointestinal: []Nausea  []Vomiting  []Abdominal Pain  []Constipation  []Diarrhea  []Dark Bloody Stools  [x] Denies all unless marked  Psychiatric/Behavioral:[] Depression [] Anxiety [x] Denies all unless marked  Genitourinary:   [] Frequency  [] Urgency  [] Incontinence [] Pain with Urination  [x] Denies all unless marked  Extremities: []Pain  []Swelling  [x] Denies all unless marked  Musculoskeletal: [] Muscle Pain  [] Joint Pain  [] Arthritis [] Muscle Cramps [] Muscle Twitches  [x] Denies all unless marked  Sleep: [] Insomnia [] Snoring [] Restless Legs [] Sleep Apnea  [] Daytime Sleepiness  [x] Denies all unless marked  Skin:[] Rash [] Skin Discoloration [x] Denies all unless marked   Neurological: []Visual Disturbance/Memory Loss [] Loss of Balance [] Slurred Speech/Weakness [] Seizures  [] Vertigo/Dizziness [x] Denies all unless marked     
6 hours as needed.      MOUNJARO 2.5 MG/0.5ML SOPN SC injection INJECT 2.5 MG SUBCUTANEOUSLY WEEKLY      sucralfate (CARAFATE) 1 GM tablet Take 1 tablet by mouth 3 times daily 90 tablet 1    mupirocin (BACTROBAN) 2 % ointment       ondansetron (ZOFRAN) 4 MG tablet       metFORMIN (GLUCOPHAGE) 500 MG tablet Take 2 tablets by mouth 2 times daily      Accu-Chek Softclix Lancets MISC       ACCU-CHEK MAUREEN PLUS strip       Blood Glucose Calibration (ACCU-CHEK MAUREEN) SOLN       Alcohol Swabs (DROPSAFE ALCOHOL PREP) 70 % PADS       amLODIPine (NORVASC) 5 MG tablet Take 1 tablet by mouth 2 times daily 180 tablet 3    aspirin 81 MG EC tablet Take 1 tablet by mouth daily      glimepiride (AMARYL) 2 MG tablet Take 1 tablet by mouth 2 times daily      olmesartan (BENICAR) 40 MG tablet Take 1 tablet by mouth daily      simvastatin (ZOCOR) 20 MG tablet Take 1 tablet by mouth nightly         /74   Pulse 74   Resp 16   Ht 1.727 m (5' 8\")   Wt 90.3 kg (199 lb)   SpO2 97%   BMI 30.26 kg/m²       Constitutional - well developed, well nourished.    Eyes - conjunctiva normal.  Pupils react to light  Ear, nose, throat -hearing intact to finger rub No scars, masses, or lesions over external nose or ears, no atrophy of tongue  Neck-symmetric, no masses noted, no jugular vein distension.  No bruits noted.  Respiration- chest wall appears symmetric, good expansion,   normal effort without use of accessory muscles  Cardiovascular- RRR  Musculoskeletal - no significant wasting of muscles noted, no bony deformities, gait no gross ataxia  Extremities-no clubbing, cyanosis or edema  Skin - warm, dry, and intact.  No rash, erythema, or pallor.  Psychiatric - mood, affect, and behavior appear normal.      Neurological exam  Awake, alert, fluent oriented x 3 appropriate affect  Attention and concentration appear appropriate.  Good remote memory.  He knew the day of the week and our most recent holiday. Knew recent sports scores. Followed

## 2024-12-10 ENCOUNTER — OFFICE VISIT (OUTPATIENT)
Dept: CARDIOLOGY CLINIC | Age: 69
End: 2024-12-10
Payer: MEDICARE

## 2024-12-10 VITALS
WEIGHT: 198 LBS | OXYGEN SATURATION: 98 % | HEART RATE: 50 BPM | DIASTOLIC BLOOD PRESSURE: 72 MMHG | BODY MASS INDEX: 30.11 KG/M2 | SYSTOLIC BLOOD PRESSURE: 136 MMHG

## 2024-12-10 DIAGNOSIS — I10 ESSENTIAL HYPERTENSION: Primary | ICD-10-CM

## 2024-12-10 PROCEDURE — 3017F COLORECTAL CA SCREEN DOC REV: CPT | Performed by: INTERNAL MEDICINE

## 2024-12-10 PROCEDURE — G8427 DOCREV CUR MEDS BY ELIG CLIN: HCPCS | Performed by: INTERNAL MEDICINE

## 2024-12-10 PROCEDURE — 3075F SYST BP GE 130 - 139MM HG: CPT | Performed by: INTERNAL MEDICINE

## 2024-12-10 PROCEDURE — G8484 FLU IMMUNIZE NO ADMIN: HCPCS | Performed by: INTERNAL MEDICINE

## 2024-12-10 PROCEDURE — 1123F ACP DISCUSS/DSCN MKR DOCD: CPT | Performed by: INTERNAL MEDICINE

## 2024-12-10 PROCEDURE — G8417 CALC BMI ABV UP PARAM F/U: HCPCS | Performed by: INTERNAL MEDICINE

## 2024-12-10 PROCEDURE — 1036F TOBACCO NON-USER: CPT | Performed by: INTERNAL MEDICINE

## 2024-12-10 PROCEDURE — 3078F DIAST BP <80 MM HG: CPT | Performed by: INTERNAL MEDICINE

## 2024-12-10 PROCEDURE — 1159F MED LIST DOCD IN RCRD: CPT | Performed by: INTERNAL MEDICINE

## 2024-12-10 PROCEDURE — 99214 OFFICE O/P EST MOD 30 MIN: CPT | Performed by: INTERNAL MEDICINE

## 2024-12-10 PROCEDURE — 1160F RVW MEDS BY RX/DR IN RCRD: CPT | Performed by: INTERNAL MEDICINE

## 2024-12-10 RX ORDER — AMLODIPINE BESYLATE 5 MG/1
5 TABLET ORAL 2 TIMES DAILY
Qty: 180 TABLET | Refills: 3 | Status: SHIPPED | OUTPATIENT
Start: 2024-12-10 | End: 2025-03-10

## 2024-12-10 ASSESSMENT — ENCOUNTER SYMPTOMS
CONSTIPATION: 0
VOMITING: 0
CHEST TIGHTNESS: 0
NAUSEA: 0
ABDOMINAL DISTENTION: 0
COUGH: 0
SORE THROAT: 0
EYE PAIN: 0
EYE DISCHARGE: 0
APNEA: 0
BLOOD IN STOOL: 0
FACIAL SWELLING: 0
DIARRHEA: 0
SHORTNESS OF BREATH: 0
WHEEZING: 0
ABDOMINAL PAIN: 0
EYE REDNESS: 0

## 2024-12-10 NOTE — PROGRESS NOTES
Cardiology Office Visit Note  [address] [facility], [place] [address]  Phone: [phone]  Fax: [phone]                            Date:  12/10/2024  Patient: Albert Pate  Age:  69 y.o., 1955    Referral: No ref. provider found    REASON FOR VISIT:  Follow-up (No cardiac concerns, recent er visit at [facility]. EKG in media)         PROBLEM LIST:    Patient Active Problem List    Diagnosis Date Noted    Bradycardia 10/15/2019     Priority: Medium    Cigarette nicotine dependence without complication 10/15/2019     Priority: Medium    Hemoptysis 10/15/2019     Priority: Medium    DEEDEE (obstructive sleep apnea) 10/15/2019     Priority: Medium    Overweight 10/15/2019     Priority: Medium    Restrictive lung disease 10/15/2019     Priority: Medium    Laryngopharyngeal reflux (LPR) 12/11/2023    Impacted cerumen of right ear 10/30/2023    Hoarseness of voice 10/30/2023         PRESENTATION: Albert Pate is a 69 y.o. year old male returns today for an interim cardiology follow-up appointment.  Recent hospitalization with altered mental status, ruled out for CVA, diagnosed with UTI.  Back at baseline currently.  No chest pain or shortness of breath.  Compliant with medications.    REVIEW OF SYSTEMS:  Review of Systems   Constitutional:  Negative for chills, fatigue and fever.   HENT:  Negative for congestion, facial swelling, hearing loss and sore throat.    Eyes:  Negative for pain, discharge, redness and visual disturbance.   Respiratory:  Negative for apnea, cough, chest tightness, shortness of breath and wheezing.    Cardiovascular:  Negative for chest pain, palpitations and leg swelling.   Gastrointestinal:  Negative for abdominal distention, abdominal pain, blood in stool, constipation, diarrhea, nausea and vomiting.   Endocrine: Negative for polydipsia, polyphagia and polyuria.   Genitourinary:  Negative for dysuria, flank pain, frequency and hematuria.   Musculoskeletal:  Negative

## 2024-12-11 ENCOUNTER — TELEPHONE (OUTPATIENT)
Dept: NEUROLOGY | Age: 69
End: 2024-12-11

## 2024-12-11 NOTE — TELEPHONE ENCOUNTER
Received call from Tiffanie at Dr.Danny Zepeda's office today . States that Dr. Chou prescribes patient Donepezil 5 mg and patient is requesting that medication be increased to 10 mg instead .  Notified Tiffanie that Dr. Chou is out of office until next Monday 12/16/2024.Stated I would place a note in patient chart for him to see . Tiffanie at Dr Thomas office stated she would notify patient that it will be next week before any changes in medication can be made .

## 2025-02-28 ENCOUNTER — APPOINTMENT (OUTPATIENT)
Dept: CT IMAGING | Age: 70
End: 2025-02-28
Payer: MEDICARE

## 2025-02-28 ENCOUNTER — HOSPITAL ENCOUNTER (EMERGENCY)
Age: 70
Discharge: HOME | End: 2025-02-28
Attending: EMERGENCY MEDICINE
Payer: MEDICARE

## 2025-02-28 VITALS
RESPIRATION RATE: 17 BRPM | HEART RATE: 97 BPM | BODY MASS INDEX: 31.67 KG/M2 | TEMPERATURE: 98 F | OXYGEN SATURATION: 95 % | SYSTOLIC BLOOD PRESSURE: 146 MMHG | WEIGHT: 209 LBS | HEIGHT: 68 IN | DIASTOLIC BLOOD PRESSURE: 97 MMHG

## 2025-02-28 DIAGNOSIS — Z86.59 HISTORY OF DEMENTIA: ICD-10-CM

## 2025-02-28 DIAGNOSIS — R41.0 TRANSIENT CONFUSION: Primary | ICD-10-CM

## 2025-02-28 LAB
ALBUMIN SERPL-MCNC: 4.7 G/DL (ref 3.5–5.2)
ALP SERPL-CCNC: 58 U/L (ref 40–129)
ALT SERPL-CCNC: 68 U/L (ref 5–41)
ANION GAP SERPL CALCULATED.3IONS-SCNC: 12 MMOL/L (ref 8–16)
AST SERPL-CCNC: 58 U/L (ref 5–40)
BACTERIA URNS QL MICRO: NEGATIVE /HPF
BASOPHILS # BLD: 0 K/UL (ref 0–0.2)
BASOPHILS NFR BLD: 0.1 % (ref 0–1)
BILIRUB SERPL-MCNC: 0.7 MG/DL (ref 0.2–1.2)
BILIRUB UR QL STRIP: NEGATIVE
BUN SERPL-MCNC: 14 MG/DL (ref 8–23)
CALCIUM SERPL-MCNC: 9.2 MG/DL (ref 8.8–10.2)
CHLORIDE SERPL-SCNC: 96 MMOL/L (ref 98–107)
CLARITY UR: CLEAR
CO2 SERPL-SCNC: 27 MMOL/L (ref 22–29)
COLOR UR: YELLOW
CREAT SERPL-MCNC: 0.8 MG/DL (ref 0.7–1.2)
CRYSTALS URNS MICRO: NORMAL /HPF
EOSINOPHIL # BLD: 0 K/UL (ref 0–0.6)
EOSINOPHIL NFR BLD: 0.5 % (ref 0–5)
EPI CELLS #/AREA URNS AUTO: 1 /HPF (ref 0–5)
ERYTHROCYTE [DISTWIDTH] IN BLOOD BY AUTOMATED COUNT: 12.3 % (ref 11.5–14.5)
GLUCOSE BLD-MCNC: 164 MG/DL (ref 70–99)
GLUCOSE SERPL-MCNC: 171 MG/DL (ref 70–99)
GLUCOSE UR STRIP.AUTO-MCNC: NEGATIVE MG/DL
HCT VFR BLD AUTO: 46.8 % (ref 42–52)
HGB BLD-MCNC: 15.8 G/DL (ref 14–18)
HGB UR STRIP.AUTO-MCNC: NEGATIVE MG/L
HYALINE CASTS #/AREA URNS AUTO: 0 /HPF (ref 0–8)
IMM GRANULOCYTES # BLD: 0 K/UL
KETONES UR STRIP.AUTO-MCNC: NEGATIVE MG/DL
LEUKOCYTE ESTERASE UR QL STRIP.AUTO: ABNORMAL
LYMPHOCYTES # BLD: 0.3 K/UL (ref 1.1–4.5)
LYMPHOCYTES NFR BLD: 4.1 % (ref 20–40)
MCH RBC QN AUTO: 31.2 PG (ref 27–31)
MCHC RBC AUTO-ENTMCNC: 33.8 G/DL (ref 33–37)
MCV RBC AUTO: 92.3 FL (ref 80–94)
MONOCYTES # BLD: 0.3 K/UL (ref 0–0.9)
MONOCYTES NFR BLD: 3.7 % (ref 0–10)
NEUTROPHILS # BLD: 7.4 K/UL (ref 1.5–7.5)
NEUTS SEG NFR BLD: 91.4 % (ref 50–65)
NITRITE UR QL STRIP.AUTO: NEGATIVE
PERFORMED ON: ABNORMAL
PH UR STRIP.AUTO: 6.5 [PH] (ref 5–8)
PLATELET # BLD AUTO: 151 K/UL (ref 130–400)
PMV BLD AUTO: 9.2 FL (ref 9.4–12.4)
POTASSIUM SERPL-SCNC: 4.4 MMOL/L (ref 3.5–5.1)
PROT SERPL-MCNC: 7.4 G/DL (ref 6.4–8.3)
PROT UR STRIP.AUTO-MCNC: 30 MG/DL
RBC # BLD AUTO: 5.07 M/UL (ref 4.7–6.1)
RBC #/AREA URNS AUTO: 1 /HPF (ref 0–4)
SODIUM SERPL-SCNC: 135 MMOL/L (ref 136–145)
SP GR UR STRIP.AUTO: 1.02 (ref 1–1.03)
UROBILINOGEN UR STRIP.AUTO-MCNC: 0.2 E.U./DL
WBC # BLD AUTO: 8.1 K/UL (ref 4.8–10.8)
WBC #/AREA URNS AUTO: 2 /HPF (ref 0–5)

## 2025-02-28 PROCEDURE — 99284 EMERGENCY DEPT VISIT MOD MDM: CPT

## 2025-02-28 PROCEDURE — 36415 COLL VENOUS BLD VENIPUNCTURE: CPT

## 2025-02-28 PROCEDURE — 82962 GLUCOSE BLOOD TEST: CPT

## 2025-02-28 PROCEDURE — 80053 COMPREHEN METABOLIC PANEL: CPT

## 2025-02-28 PROCEDURE — 70450 CT HEAD/BRAIN W/O DYE: CPT

## 2025-02-28 PROCEDURE — 81001 URINALYSIS AUTO W/SCOPE: CPT

## 2025-02-28 PROCEDURE — 85025 COMPLETE CBC W/AUTO DIFF WBC: CPT

## 2025-03-01 NOTE — ED PROVIDER NOTES
Date:     02/28/2025   Time:    15:06               LABS:  Labs Reviewed   CBC WITH AUTO DIFFERENTIAL - Abnormal; Notable for the following components:       Result Value    MCH 31.2 (*)     MPV 9.2 (*)     Neutrophils % 91.4 (*)     Lymphocytes % 4.1 (*)     Lymphocytes Absolute 0.3 (*)     All other components within normal limits   COMPREHENSIVE METABOLIC PANEL - Abnormal; Notable for the following components:    Sodium 135 (*)     Chloride 96 (*)     Glucose 171 (*)     ALT 68 (*)     AST 58 (*)     All other components within normal limits   URINALYSIS - Abnormal; Notable for the following components:    Protein, UA 30 (*)     Leukocyte Esterase, Urine TRACE (*)     All other components within normal limits   POCT GLUCOSE - Abnormal; Notable for the following components:    POC Glucose 164 (*)     All other components within normal limits   MICROSCOPIC URINALYSIS       All other labs were within normal range or not returned as of this dictation.    EMERGENCY DEPARTMENT COURSE and DIFFERENTIAL DIAGNOSIS/MDM:   Vitals:    Vitals:    02/28/25 1550 02/28/25 1555 02/28/25 1632 02/28/25 1701   BP: (!) 173/105  (!) 154/78 (!) 146/97   Pulse: 97 97 94 97   Resp:       Temp:       TempSrc:       SpO2: 95% 95%  95%   Weight:       Height:           MDM      Reassessment  ***    CONSULTS:  None    PROCEDURES:  Unless otherwise noted below, none     Procedures    FINAL IMPRESSION      1. Transient confusion    2. History of dementia          DISPOSITION/PLAN   DISPOSITION Decision To Discharge 02/28/2025 06:11:13 PM   DISPOSITION CONDITION Stable           PATIENT REFERRED TO:  Orlin Zepeda MD  [address] [address]  [phone]            DISCHARGE MEDICATIONS:  New Prescriptions    No medications on file          (Please note that portions of this note were completed with a voice recognition program.  Efforts were made to edit thedictations but occasionally words are  this note were completed with a voice recognition program.  Efforts were made to edit thedictations but occasionally words are mis-transcribed.)    Neto France MD (electronically signed)  Attending Emergency Physician          Neto France MD  03/05/25 [id number]

## 2025-03-03 ENCOUNTER — TELEPHONE (OUTPATIENT)
Dept: NEUROLOGY | Age: 70
End: 2025-03-03

## 2025-03-03 NOTE — TELEPHONE ENCOUNTER
Pt's wife called wanting sooner appt than 3/26/25 due pt having new symptoms and went to [facility] ED 2/28/25.  Per wife, on Friday, Feb 28 pt started having trouble walking/standing. When he stood he would wobble and shuffle feet when trying to walk.  [facility] unable to schedule pt, please contact to discuss, thank you.

## 2025-03-03 NOTE — TELEPHONE ENCOUNTER
I have called and left patient wife to let her know that I have her  scheduled an appointment with Dr. Chou for 03-06-25 at 12pm.

## 2025-03-05 ASSESSMENT — ENCOUNTER SYMPTOMS
ABDOMINAL PAIN: 0
WHEEZING: 0
VOMITING: 0
SHORTNESS OF BREATH: 0
ABDOMINAL DISTENTION: 0

## 2025-03-06 ENCOUNTER — OFFICE VISIT (OUTPATIENT)
Dept: NEUROLOGY | Age: 70
End: 2025-03-06
Payer: MEDICARE

## 2025-03-06 VITALS
HEART RATE: 77 BPM | HEIGHT: 68 IN | WEIGHT: 209 LBS | SYSTOLIC BLOOD PRESSURE: 142 MMHG | OXYGEN SATURATION: 97 % | DIASTOLIC BLOOD PRESSURE: 86 MMHG | RESPIRATION RATE: 16 BRPM | BODY MASS INDEX: 31.67 KG/M2

## 2025-03-06 DIAGNOSIS — R90.89 ABNORMAL CT OF BRAIN: ICD-10-CM

## 2025-03-06 DIAGNOSIS — I65.21 CAROTID OCCLUSION, RIGHT: ICD-10-CM

## 2025-03-06 DIAGNOSIS — R41.3 MEMORY LOSS: Primary | ICD-10-CM

## 2025-03-06 PROCEDURE — 1123F ACP DISCUSS/DSCN MKR DOCD: CPT | Performed by: PSYCHIATRY & NEUROLOGY

## 2025-03-06 PROCEDURE — 1159F MED LIST DOCD IN RCRD: CPT | Performed by: PSYCHIATRY & NEUROLOGY

## 2025-03-06 PROCEDURE — 99214 OFFICE O/P EST MOD 30 MIN: CPT | Performed by: PSYCHIATRY & NEUROLOGY

## 2025-03-06 NOTE — PROGRESS NOTES
REVIEW OF SYSTEMS    Constitutional: []Fever []Sweat []Chills [] Recent Injury [x] Denies all unless marked  HEENT:[]Headache  [] Head Injury/Hearing Loss  [] Sore Throat  [] Ear Ache/Dizziness  [x] Denies all unless marked  Spine:  [] Neck pain  [] Back pain  [] Sciaticia  [x] Denies all unless marked  Cardiovascular:[]Heart Disease []Chest Pain [] Palpitations  [x] Denies all unless marked  Pulmonary: []Shortness of Breath []Cough   [x] Denies all unless marke  Gastrointestinal: []Nausea  []Vomiting  []Abdominal Pain  []Constipation  []Diarrhea  []Dark Bloody Stools  [x] Denies all unless marked  Psychiatric/Behavioral:[] Depression [] Anxiety [x] Denies all unless marked  Genitourinary:   [] Frequency  [] Urgency  [] Incontinence [] Pain with Urination  [x] Denies all unless marked  Extremities: []Pain  []Swelling  [x] Denies all unless marked  Musculoskeletal: [] Muscle Pain  [] Joint Pain  [] Arthritis [] Muscle Cramps [] Muscle Twitches  [x] Denies all unless marked  Sleep: [] Insomnia [] Snoring [] Restless Legs [] Sleep Apnea  [] Daytime Sleepiness  [x] Denies all unless marked  Skin:[] Rash [] Skin Discoloration [x] Denies all unless marked   Neurological: []Visual Disturbance/Memory Loss [] Loss of Balance [] Slurred Speech/Weakness [] Seizures  [] Vertigo/Dizziness [x] Denies all unless marked     
40 MG tablet Take 1 tablet by mouth daily 90 tablet 1    donepezil (ARICEPT) 5 MG tablet Take 1 tablet by mouth nightly (Patient taking differently: Take 2 tablets by mouth nightly) 90 tablet 3    Hyoscyamine Sulfate SL (LEVSIN/SL) 0.125 MG SUBL 1 tab by mouth 3 times a day as needed for esophageal spasms 90 each 1    HYDROcodone-acetaminophen (NORCO) 5-325 MG per tablet Take 1 tablet by mouth every 6 hours as needed.      Budeson-Glycopyrrol-Formoterol (BREZTRI AEROSPHERE) 160-9-4.8 MCG/ACT AERO       MOUNJARO 2.5 MG/0.5ML SOPN SC injection INJECT 2.5 MG SUBCUTANEOUSLY WEEKLY      sucralfate (CARAFATE) 1 GM tablet Take 1 tablet by mouth 3 times daily 90 tablet 1    mupirocin (BACTROBAN) 2 % ointment       ondansetron (ZOFRAN) 4 MG tablet       metFORMIN (GLUCOPHAGE) 500 MG tablet Take 2 tablets by mouth 2 times daily      Accu-Chek Softclix Lancets MISC       ACCU-CHEK MAUREEN PLUS strip       Blood Glucose Calibration (ACCU-CHEK MAUREEN) SOLN       Alcohol Swabs (DROPSAFE ALCOHOL PREP) 70 % PADS       aspirin 81 MG EC tablet Take 1 tablet by mouth daily      glimepiride (AMARYL) 2 MG tablet Take 1 tablet by mouth 2 times daily      olmesartan (BENICAR) 40 MG tablet Take 1 tablet by mouth daily      simvastatin (ZOCOR) 20 MG tablet Take 1 tablet by mouth nightly         BP (!) 142/86   Pulse 77   Resp 16   Ht 1.727 m (5' 8\")   Wt 94.8 kg (209 lb)   SpO2 97%   BMI 31.78 kg/m²       Constitutional - well developed, well nourished.    Eyes - conjunctiva normal.  Pupils react to light  Ear, nose, throat -hearing intact to finger rub No scars, masses, or lesions over external nose or ears, no atrophy of tongue  Neck-symmetric, no masses noted, no jugular vein distension.  No bruits noted.  Respiration- chest wall appears symmetric, good expansion,   normal effort without use of accessory muscles  Cardiovascular- RRR  Musculoskeletal - no significant wasting of muscles noted, no bony deformities, gait no gross

## 2025-03-07 RX ORDER — PANTOPRAZOLE SODIUM 40 MG/1
40 TABLET, DELAYED RELEASE ORAL DAILY
Qty: 90 TABLET | Refills: 1 | Status: SHIPPED | OUTPATIENT
Start: 2025-03-07

## 2025-03-07 NOTE — TELEPHONE ENCOUNTER
Albert is requesting a refill of their   Requested Prescriptions     Pending Prescriptions Disp Refills    pantoprazole (PROTONIX) 40 MG tablet 90 tablet 1     Sig: Take 1 tablet by mouth daily   .Hyoscyamine Sulfate SL (LEVSIN/SL) 0.125 MG SUBL       Last Appt:  7/2/2024  Next Appt:     Preferred pharmacy:[facility], [address] [phone] F [phone]

## 2025-03-09 LAB
EKG P AXIS: 45 DEGREES
EKG P-R INTERVAL: 144 MS
EKG Q-T INTERVAL: 336 MS
EKG QRS DURATION: 78 MS
EKG QTC CALCULATION (BAZETT): 385 MS
EKG T AXIS: 35 DEGREES

## 2025-03-21 ENCOUNTER — HOSPITAL ENCOUNTER (OUTPATIENT)
Dept: NEUROLOGY | Age: 70
Discharge: HOME | End: 2025-03-21
Attending: PSYCHIATRY & NEUROLOGY
Payer: MEDICARE

## 2025-03-21 DIAGNOSIS — R41.3 MEMORY LOSS: ICD-10-CM

## 2025-03-21 PROCEDURE — 95813 EEG EXTND MNTR 61-119 MIN: CPT

## 2025-03-25 PROBLEM — R41.3 MEMORY LOSS: Status: ACTIVE | Noted: 2025-03-25

## 2025-03-27 NOTE — PROGRESS NOTES
Neurology Progress Note      Chief Complaint:  f/u AMS  Length of Stay:  1   Subjective     Subjective:  Sitting up in bed feeding self breakfast. Wife at bedside. Dr. DOROTHEA Blanco at bedside. Reviewed EEG and MRI brain results. Wife did bring CPAP from home. She reports patient had confusion and some behavior problems during the night, picking in the air and calling the night RN names. UA done yesterday shows UTI and patient started on Rocephin.        Medications:  Current Facility-Administered Medications   Medication Dose Route Frequency Provider Last Rate Last Admin    acetaminophen (TYLENOL) tablet 650 mg  650 mg Oral Q4H PRN Rodri Francis MD        Or    acetaminophen (TYLENOL) suppository 650 mg  650 mg Rectal Q4H PRN Rodri Francis MD        amLODIPine (NORVASC) tablet 5 mg  5 mg Oral Daily Rodri Francis MD   5 mg at 08/20/24 0953    aspirin EC tablet 81 mg  81 mg Oral Daily Rodri Francis MD   81 mg at 08/20/24 0953    atorvastatin (LIPITOR) tablet 40 mg  40 mg Oral Daily Rodri Francis MD   40 mg at 08/20/24 0953    cefTRIAXone (ROCEPHIN) 1,000 mg in sodium chloride 0.9 % 100 mL MBP  1,000 mg Intravenous Q24H Ele Odonnell APRN 200 mL/hr at 08/20/24 1251 1,000 mg at 08/20/24 1251    dextrose (D50W) (25 g/50 mL) IV injection 25 g  25 g Intravenous Q15 Min PRN Rodri Francis MD        dextrose (GLUTOSE) oral gel 15 g  15 g Oral Q15 Min PRN Rodri Francis MD        Enoxaparin Sodium (LOVENOX) syringe 40 mg  40 mg Subcutaneous Daily Rodri Francis MD   40 mg at 08/20/24 1020    glucagon (GLUCAGEN) injection 1 mg  1 mg Intramuscular Q15 Min PRN Rodri Francis MD        Insulin Lispro (humaLOG) injection 2-7 Units  2-7 Units Subcutaneous 4x Daily AC & at Bedtime Rodri Francis MD   3 Units at 08/20/24 2014    labetalol (NORMODYNE,TRANDATE) injection 10 mg  10 mg Intravenous Q10 Min NANCYN Rodri Francis MD    "3/27/2025 9:48 AM  Juan Antonio Rowe  1953  [id number]    Capacity Evaluation    History of Present Illness     Juan Antonio Rowe is a 71 y.o. female with a past psychiatric history of chronic benzodiazepine use, anxiety, depression, and suicide attempts (most recent Jan 2025), currently presenting with Hyponatremia.  Addiction psychiatry was originally consulted for evaluation and recs for this patient with a history of benzo withdrawals and hx of suicide attempt via drug overdose. Pt was started on a librium taper to be managed by the primary team. Lexapro and remeron being held 2/2 hyponatremia. Resources offered to pt at this time.    On today's exam, pt lying in bed sleeping. Awakens to my voice and is calm and cooperative during interview. Alert and oriented x3, does not understand why she is admitted to the hospital (other than drug withdrawal). She states she came to the hospital because she was withdrawing from drugs. When told she is also in the hospital because of hyponatremia she states "that sounds familiar." When asked to explain what this means she states "I don't know." After explaining the reason of admission to her including what could happen if she leaves the hospital without having her levels corrected she is still unable to answer any questions regarding the risks versus benefits of staying in the hospital versus leaving AMA. She repeatedly states "I don't know" even after several attempts are made to explain the reasons to patient. Expressed that if she is unable to relay to me that she has an understanding of her hospital admission and what could happen if she leaves before medically stable she would have to be PECd if she tries to AMA again. She states "okay." PEC explained in full to patient and she still states "okay." She does state "I don't plan to leave but I hope they discharge me soon."    She denies current SI/HI/AVH.    Mental Status Exam     CAM-ICU:  Acute change and/or fluctuating "      losartan (COZAAR) tablet 50 mg  50 mg Oral Daily Rodri Francis MD   50 mg at 08/20/24 0953    ondansetron (ZOFRAN) injection 4 mg  4 mg Intravenous Q6H PRN Rodri Francis MD   4 mg at 08/21/24 0658    pantoprazole (PROTONIX) EC tablet 40 mg  40 mg Oral Daily Rodri Francis MD   40 mg at 08/20/24 0952    sodium chloride 0.9 % flush 10 mL  10 mL Intravenous PRN oRdri Francis MD        sodium chloride 0.9 % flush 10 mL  10 mL Intravenous Q12H Rodri Francis MD   10 mL at 08/20/24 2029    sodium chloride 0.9 % flush 10 mL  10 mL Intravenous PRN Rodri Francis MD        sodium chloride 0.9 % infusion 40 mL  40 mL Intravenous PRN Rodri Francis MD        sodium chloride 0.9 % infusion  100 mL/hr Intravenous Continuous Rodri Francis  mL/hr at 08/20/24 1508 100 mL/hr at 08/20/24 1508             Objective      Vital Signs  Temp:  [98.3 °F (36.8 °C)-100.2 °F (37.9 °C)] 98.4 °F (36.9 °C)  Heart Rate:  [] 92  Resp:  [18] 18  BP: (150-175)/(58-86) 150/62    Physical Exam:    Pertinent Neuro Exam:    Last nurse assessment:  General Exam:  Head:  Normocephalic, atraumatic  HEENT:  Neck supple  Fundoscopic Exam:  No signs of disc edema  CVS:  Regular rate and rhythm.  No murmurs  Carotid Examination:  No bruits  Lungs:  Clear to auscultation  Abdomen:  Nontender, Nondistended  Extremities:  No signs of peripheral edema  Skin:  No rashes     Neurologic Exam:     Mental Status:    -Awake, Alert, Oriented X 3  -No word finding difficulties  -No aphasia  -No dysarthria  -Follows simple and complex commands     CN II:  Visual fields full.  Pupils equally reactive to light  CN III, IV, VI:  Extraocular Muscles full with no signs of nystagmus  CN V:  Facial sensory is symmetric with no asymmetries.  CN VII:  Facial motor symmetric  CN VIII:  Gross hearing intact bilaterally  CN IX:  Palate elevates symmetrically  CN X:  Palate elevates  "course of mental status: No  Inattention (SAVEAHAART): No  "Squeeze my hand, only when you hear, the letter 'A'."  Altered Level of Consciousness: No  Disorganized Thinking (Errors >1/6): No  "Will a stone float on water?"  "Are there fish in the sea?"  "Does one pound weigh more than two?"  "Can you use a hammer to pound a nail?"  Command(s):  "Hold up 2 fingers."  "Now do the same thing with the other hand."    Score: 1+2 AND, either 3 or 4 present = Positive CAM-ICU    Appearance: unremarkable, age appropriate   Level of Consciousness: alert, awake   Grooming:  appropriate to situation   Psychomotor Behavior: normal, cooperative   Speech: normal tone, normal rate, normal pitch, normal volume   Language: english, fluid   Orientation:   grossly intact, person, place, time/date   Attention Span/Concentration: Normal   Memory: Grossly intact   Mood: "neutral"   Affect: normal   Thought Process: normal and logical   Associations: normal and logical   Thought Content: normal, no suicidality, no homicidality, delusions, or paranoia   Fund of Knowledge: adequate to education level   Insight: poor   Judgment:  poor     Capacity Evaluation     Capacity question:  Does the patient possess the ability to make an informed decision, regarding leaving the hospital AMA?    Capacity for a given decision requires:  Understanding - the ability to state the meaning of the relevant information (eg, diagnosis, risks and benefits of a treatment or procedure, indications, and options of care).  The patient must be able to understand the proposed treatment, and/or options for her care.  Appreciation - the ability to explain how information (eg, diagnosis, benefit, and risk) applies to oneself.  The patient must be able to appreciate her own medical situation, and abstract, as to how the treatments/proposed options for care, apply to her medical situation.  Reasoning - the ability to compare information and infer consequences of " "choice.  The patient must be able to understand the consequences of her medical decision, in a reasonable manner, supported by the facts, and her own values, in a consistent fashion.  Expressing a choice - the ability to state a decision.  The patient must demonstrate the ability to communicate a choice, clearly and consistently.    Assessment of capacity:  The patient understands the clinical condition relation to this evaluation: No.  The patient understands the indication and nature of/reasoning behind the proposed treatment(s) and/or options for her care: No.  The patient understands and appreciates the risks and benefits of the proposed treatment(s) and/or options for her care: No.  The patient understands and appreciates the risks and benefits of refusing the proposed treatment(s) and/or options for her care: No.  The patient is in not in agreement with the assessment, however, she states she does not wish to leave the hospital AMA at this time. She cannot state how many more days she is willing to say. She states "it better be soon." Explained to pt if she attempts to leave AMA she will have to be PECd. She states "okay."  The patient has evidence of impaired insight and/or judgment: Yes.    Recommendations     Based upon my evaluation of Juan Antonio Rowe regarding her medical decision-making capacity to leave the hospital AMA, I found with reasonable certainty that Juan Antonio Rowe does not have capacity to leave the hospital AMA at this time.  If pt tries to AMA before medically stable, she will likely need PEC. This was explained to pt in detail and she states she understands.    Case discussed with: Dr. Veronica Mann, PMHNP  [facility]     " symmetrically  CN XI:  Shoulder shrug symmetric  CN XII:  Tongue is midline on protrusion     Motor: (strength out of 5:  1= minimal movement, 2 = movement in plane of gravity, 3 = movement against gravity, 4 = movement against some resistance, 5 = full strength)     -Right Upper Ext: Proximal: 5Distal: 5  -Left Upper Ext: Proximal: 5Distal: 5     -Right Lower Ext: Proximal: 5Distal: 5  -Left Lower Ext: Proximal: 5Distal: 5     DTR:  -Right              Biceps: 2+       Triceps: 2+      Brachioradialis: 2+              Patella: 2+       Ankle: 2+         Neg Babinski  -Left              Biceps: 2+       Triceps: 2+      Brachioradialis: 2+              Patella: 2+       Ankle: 2+         Neg Babinski     Sensory:  -Intact to light touch, pinprick, temperature, pain, and proprioception     Coordination:  -Finger to nose intact  -Heel to shin intact  -No ataxia     Gait  -No signs of ataxia  -ambulates unassisted  1a. Level of Consciousness: 0-->Alert, keenly responsive  1b. LOC Questions: 0-->Answers both questions correctly  1c. LOC Commands: 0-->Performs both tasks correctly  2. Best Gaze: 0-->Normal  3. Visual: 1-->Partial hemianopia  4. Facial Palsy: 1-->Minor paralysis (flattened nasolabial fold, asymmetry on smiling)  5a. Motor Arm, Left: 0-->No drift, limb holds 90 (or 45) degrees for full 10 secs  5b. Motor Arm, Right: 0-->No drift, limb holds 90 (or 45) degrees for full 10 secs  6a. Motor Leg, Left: 0-->No drift, leg holds 30 degree position for full 5 secs  6b. Motor Leg, Right: 0-->No drift, leg holds 30 degree position for full 5 secs  7. Limb Ataxia: 0-->Absent  8. Sensory: 0-->Normal, no sensory loss  9. Best Language: 0-->No aphasia, normal  10. Dysarthria: 0-->Normal  11. Extinction and Inattention (formerly Neglect): 0-->No abnormality    Total (NIH Stroke Scale): 2       Results Review:      Labs:  Result Review:  I have personally reviewed the results from the time of this admission to 8/21/2024  09:08 CDT and agree with these findings:  []  Laboratory list / accordion  [x]  Microbiology  []  Radiology  []  EKG/Telemetry   []  Cardiology/Vascular   []  Pathology  []  Old records  []  Other:    Imaging:  MRI Brain Without Contrast    Result Date: 8/20/2024  1. Mild atrophy and small vessel disease. 2. No acute intracranial abnormality. 3. No acute infarct.    This report was signed and finalized on 8/20/2024 7:43 PM by Dr. Clifford Beauchamp MD.      EEG    Result Date: 8/20/2024  Diffuse cerebral dysfunction of mild degree but nonspecific.  This is most commonly seen due to toxic/metabolic or hypoxemic cause No evidence for epilepsy is seen This report is transcribed using the Dragon dictation system.      CT Angiogram Neck    Result Date: 8/20/2024  1. Complete occlusion of the right internal carotid artery at the origin/common carotid bifurcation. No reconstitution in the neck. 2. Atheromatous changes of the common and internal carotid arteries bilaterally. No significant stenosis on the left. 3. A dominant left vertebral artery arises directly from the aortic arch. 4. The NASCET criteria was utilized for estimation of carotid arterial stenosis.  The above study was initially reviewed and reported by [facility]. I do not find any discrepancies.             This report was signed and finalized on 8/20/2024 7:36 AM by Dr. Pascual Woodson MD.      CT Angiogram Head w AI Analysis of LVO    Result Date: 8/20/2024  1. The heavily atheromatous internal carotid arteries in the carotid canal and cavernous sinus bilaterally. 2. Poor reopacification of the distal right internal carotid artery in the distal carotid canal and right cavernous sinus from the left internal carotid artery through the anterior communicating branch. The remaining intracranial circulation is unremarkable without any area of flow-limiting stenosis or aneurysmal dilatation. 3. A fetal-type left posterior circulation. 4. The NASCET criteria were  utilized for estimation of carotid arterial stenosis.  The above study was initially reviewed and reported by [facility]. I do not find any discrepancies.  This report was signed and finalized on 8/20/2024 7:18 AM by Dr. Pascual Woodson MD.      CT CEREBRAL PERFUSION WITH & WITHOUT CONTRAST    Result Date: 8/20/2024  Impression: 1. Today's exam demonstrates no evidence of a core infarct with no volume of brain demonstrating a Tmax greater than 6.0 seconds or a CBF of less than 30%. However, there is noted to be relative hypoperfusion involving both the right MCA and PCA distributions with a Tmax of greater than 4.0 seconds in both of these distributions on mapping. This is substantiated on the rapid angio rendering MIPS with relative diminished density of contrast within the right MCA distribution as well as irregularity and diminished density of enhancement within the right posterior cerebral artery distribution. Correlation with the CT angiogram demonstrates that the cervical segment of the right internal carotid artery is occluded. Flow to the right MCA distribution is related to reconstitution from the contralateral hemisphere resulting in a delay in transit time. There is also disease within the P1 segment of the right posterior cerebral artery with diminished Tmax and a delayed transit time into the right hemisphere in the PCA distribution. 2. The emergency room was notified of the findings  This report was signed and finalized on 8/20/2024 6:48 AM by Dr. Dharmesh Gruber MD.      XR Chest 1 View    Result Date: 8/20/2024  1. No acute disease.  This report was signed and finalized on 8/20/2024 6:25 AM by Dr. Dharmesh Gruber MD.      CT Head Without Contrast Stroke Protocol    Result Date: 8/20/2024  1. No acute intracranial abnormality.  The above study was initially reviewed and reported by [facility]. I do not find any discrepancies.             This report was signed and finalized on 8/20/2024 5:33 AM by   Pascual Woodson MD.        Assessment/Plan   Patient presenting with no focal neurologic deficits but instead some altered mentation just after awakening.  The fact that he had possible incontinence means that we should rule out epileptic etiologies.  .  I think overall this is most consistent with underlying hypoxia in the setting of a diagnosis of obstructive sleep apnea without treatment.  I believe that the patient would benefit from treatment for his MACRINA.  He may need to consider an Inspire Device given that he does not enjoy his mask.  EEG and MRI brain unremarkable. Patient having sundowning behavior last PM.       Hospital Problem List      Balance disorder    MACRINA (obstructive sleep apnea)    Right internal carotid occlusion    Primary hypertension    Impression:  Sleep disorder with altered cognition secondary to likely prolonged hypoxia overnight  Low likelihood of this being an ischemic etiology  Right ICA occlusion which is likely chronic in nature.  No indication for vascular surgery consultation as this does represent a complete occlusion  Heavy intracranial atherosclerotic plaque requiring antiplatelets and high-dose stati    Plan:  Highly recommend treatment of obstructive sleep apnea as an outpatient  Continue aspirin 81 mg  Continue Zocor 20 mg with an LDL goal less than 70   Patient cleared to d/c home if ok with attending as this is best for sun downing behavior.  F/u with Dr. Najera, his primary neurologist,  in 4-6 weeks.      Medical Decision Making    Number/Complexity of Problems  Moderate  1 undiagnosed new problem with uncertain prognosis -   1 acute illness with systemic symptoms -   High  1 acute or chronic illness that poses a threat to life/body function -   high     MDM Data  Moderate - 1/3 categories  Extensive - 2/3 categories    Category 1: 3 of the following  Review of external notes  Review of results  Ordering of each unique test  Independent historian  Category 2:   Independent interpretation of test (ex: imaging)  Category 3:  Discussion of management with another provider    extenisve     Treatment Plan  Moderate - Prescription Drug management  High  Drug therapy requiring intensive monitoring for toxicity  Decision regarding hospitalization or escalation of care  De-escalate care/DNR decisions  moderate       Shital Nguyễn, APRN  08/21/24  09:06 CDT

## 2025-04-15 ENCOUNTER — TELEPHONE (OUTPATIENT)
Dept: NEUROLOGY | Age: 70
End: 2025-04-15

## 2025-04-15 NOTE — TELEPHONE ENCOUNTER
Pt's wife  is calling  to request referral. Per wife pt has decided to proceed with lumbar puncture. Was advised by provider to call once decided to place order and schedule.   Please return call to update  Tanvi  on the referral status. Tanvi's  preferred call back time is Anytime.    Thank you!

## 2025-04-24 DIAGNOSIS — R41.3 MEMORY LOSS: Primary | ICD-10-CM

## 2025-04-24 RX ORDER — LORAZEPAM 2 MG/1
2 TABLET ORAL
Qty: 2 TABLET | Refills: 0 | Status: SHIPPED | OUTPATIENT
Start: 2025-04-24 | End: 2025-05-24

## 2025-06-04 ENCOUNTER — OFFICE VISIT (OUTPATIENT)
Dept: NEUROLOGY | Age: 70
End: 2025-06-04
Payer: MEDICARE

## 2025-06-04 VITALS
RESPIRATION RATE: 16 BRPM | BODY MASS INDEX: 31.67 KG/M2 | HEIGHT: 68 IN | SYSTOLIC BLOOD PRESSURE: 140 MMHG | HEART RATE: 74 BPM | OXYGEN SATURATION: 97 % | WEIGHT: 209 LBS | DIASTOLIC BLOOD PRESSURE: 80 MMHG

## 2025-06-04 DIAGNOSIS — R90.89 ABNORMAL CT OF BRAIN: ICD-10-CM

## 2025-06-04 DIAGNOSIS — R41.3 MEMORY LOSS: Primary | ICD-10-CM

## 2025-06-04 DIAGNOSIS — I65.21 CAROTID OCCLUSION, RIGHT: ICD-10-CM

## 2025-06-04 PROCEDURE — 1159F MED LIST DOCD IN RCRD: CPT | Performed by: PSYCHIATRY & NEUROLOGY

## 2025-06-04 PROCEDURE — 99214 OFFICE O/P EST MOD 30 MIN: CPT | Performed by: PSYCHIATRY & NEUROLOGY

## 2025-06-04 PROCEDURE — 1123F ACP DISCUSS/DSCN MKR DOCD: CPT | Performed by: PSYCHIATRY & NEUROLOGY

## 2025-06-04 RX ORDER — DOXAZOSIN 4 MG/1
4 TABLET ORAL DAILY
COMMUNITY
Start: 2025-06-02

## 2025-06-04 NOTE — PROGRESS NOTES
REVIEW OF SYSTEMS    Constitutional: []Fever []Sweat []Chills [] Recent Injury [x] Denies all unless marked  HEENT:[]Headache  [] Head Injury/Hearing Loss  [] Sore Throat  [] Ear Ache/Dizziness  [x] Denies all unless marked  Spine:  [] Neck pain  [] Back pain  [] Sciaticia  [x] Denies all unless marked  Cardiovascular:[]Heart Disease []Chest Pain [] Palpitations  [x] Denies all unless marked  Pulmonary: []Shortness of Breath []Cough   [x] Denies all unless marke  Gastrointestinal: []Nausea  []Vomiting  []Abdominal Pain  []Constipation  []Diarrhea  []Dark Bloody Stools  [x] Denies all unless marked  Psychiatric/Behavioral:[] Depression [] Anxiety [x] Denies all unless marked  Genitourinary:   [] Frequency  [] Urgency  [] Incontinence [] Pain with Urination  [x] Denies all unless marked  Extremities: []Pain  []Swelling  [x] Denies all unless marked  Musculoskeletal: [] Muscle Pain  [] Joint Pain  [] Arthritis [] Muscle Cramps [] Muscle Twitches  [x] Denies all unless marked  Sleep: [] Insomnia [] Snoring [] Restless Legs [] Sleep Apnea  [] Daytime Sleepiness  [x] Denies all unless marked  Skin:[] Rash [] Skin Discoloration [x] Denies all unless marked   Neurological: []Visual Disturbance/Memory Loss [] Loss of Balance [] Slurred Speech/Weakness [] Seizures  [] Vertigo/Dizziness [x] Denies all unless marked     
12/4/2025).    (Please note that portions of this note were completed with a voice recognition program. Efforts were made to edit the dictations but occasionally words are mis-transcribed.)

## 2025-06-23 RX ORDER — HYOSCYAMINE SULFATE 0.12 MG/1
0.12 TABLET SUBLINGUAL EVERY 4 HOURS PRN
Status: CANCELLED | OUTPATIENT
Start: 2025-06-23

## 2025-06-23 RX ORDER — HYOSCYAMINE SULFATE 0.12 MG/1
0.12 TABLET SUBLINGUAL
Qty: 90 TABLET | Refills: 3 | Status: SHIPPED | OUTPATIENT
Start: 2025-06-23

## 2025-07-02 ENCOUNTER — OFFICE VISIT (OUTPATIENT)
Dept: ENT CLINIC | Age: 70
End: 2025-07-02
Payer: MEDICARE

## 2025-07-02 VITALS
BODY MASS INDEX: 31.25 KG/M2 | WEIGHT: 206.2 LBS | SYSTOLIC BLOOD PRESSURE: 158 MMHG | HEIGHT: 68 IN | DIASTOLIC BLOOD PRESSURE: 92 MMHG

## 2025-07-02 DIAGNOSIS — K22.4 ESOPHAGEAL SPASM: ICD-10-CM

## 2025-07-02 DIAGNOSIS — H61.21 IMPACTED CERUMEN OF RIGHT EAR: ICD-10-CM

## 2025-07-02 DIAGNOSIS — K21.9 LARYNGOPHARYNGEAL REFLUX (LPR): Primary | ICD-10-CM

## 2025-07-02 PROCEDURE — 1159F MED LIST DOCD IN RCRD: CPT | Performed by: PHYSICIAN ASSISTANT

## 2025-07-02 PROCEDURE — 1123F ACP DISCUSS/DSCN MKR DOCD: CPT | Performed by: PHYSICIAN ASSISTANT

## 2025-07-02 PROCEDURE — 99213 OFFICE O/P EST LOW 20 MIN: CPT | Performed by: PHYSICIAN ASSISTANT

## 2025-07-02 PROCEDURE — 1160F RVW MEDS BY RX/DR IN RCRD: CPT | Performed by: PHYSICIAN ASSISTANT

## 2025-07-02 PROCEDURE — 69210 REMOVE IMPACTED EAR WAX UNI: CPT | Performed by: PHYSICIAN ASSISTANT

## 2025-07-02 RX ORDER — FUROSEMIDE 20 MG/1
20 TABLET ORAL DAILY PRN
COMMUNITY
Start: 2025-06-28

## 2025-07-02 RX ORDER — TIRZEPATIDE 7.5 MG/.5ML
INJECTION, SOLUTION SUBCUTANEOUS
COMMUNITY
Start: 2025-06-23

## 2025-07-02 RX ORDER — ONDANSETRON 4 MG/1
TABLET, ORALLY DISINTEGRATING ORAL
COMMUNITY
Start: 2025-04-23

## 2025-07-02 RX ORDER — DONEPEZIL HYDROCHLORIDE 10 MG/1
10 TABLET, FILM COATED ORAL DAILY
COMMUNITY
Start: 2025-06-02

## 2025-07-02 RX ORDER — METOPROLOL SUCCINATE 50 MG/1
50 TABLET, EXTENDED RELEASE ORAL DAILY
COMMUNITY

## 2025-07-02 ASSESSMENT — ENCOUNTER SYMPTOMS
SINUS PRESSURE: 0
SORE THROAT: 0
EYE PAIN: 0
RHINORRHEA: 0
VOICE CHANGE: 0
PHOTOPHOBIA: 0
SINUS PAIN: 0
FACIAL SWELLING: 0
TROUBLE SWALLOWING: 0

## 2025-07-02 NOTE — ASSESSMENT & PLAN NOTE
Clinically improving LPR with secondary sporadic esophageal spasms  Plan: I recommended the patient to continue his Protonix 40 mg daily.  Will also have him to use the Levsin as needed for the spasms.  Due to the patient being stable, I will have him to follow-up in 1 year unless symptoms worsen.

## 2025-07-02 NOTE — PROGRESS NOTES
[facility] OTOLARYNGOLOGY/ENT  Mr. Pate is a pleasant 69-year-old  male that presents for a 1 year follow-up due to LPR and sporadic episodes with esophageal spasms.  He reports that the Protonix and Levsin are working well for his current symptoms.  He denies any dysphagia to include solid or liquids.  Also denies any vocal hoarseness.  He has been experiencing sporadic coughing episodes but admits to having COPD due to tobacco abuse.        Allergies: Jardiance [empagliflozin] and Metoprolol      Current Outpatient Medications   Medication Sig Dispense Refill    furosemide (LASIX) 20 MG tablet Take 1 tablet by mouth daily as needed      metoprolol succinate (TOPROL XL) 50 MG extended release tablet Take 1 tablet by mouth daily      ondansetron (ZOFRAN-ODT) 4 MG disintegrating tablet PLACE 1 TABLET 3 TIMES A DAY BY TRANSLINGUAL ROUTE AS NEEDED FOR 7 DAYS      donepezil (ARICEPT) 10 MG tablet Take 1 tablet by mouth daily      MOUNJARO 7.5 MG/0.5ML SOAJ pen INJECT 7.5 MG SUBCUTANEOUSLY ONE TIME PER WEEK FOR 28 DAYS      hyoscyamine (LEVSIN/SL) 0.125 MG sublingual tablet Place 1 tablet under the tongue 3 times daily (before meals) 90 tablet 3    doxazosin (CARDURA) 4 MG tablet Take 1 tablet by mouth daily      pantoprazole (PROTONIX) 40 MG tablet Take 1 tablet by mouth daily 90 tablet 1    HYDROcodone-acetaminophen (NORCO) 5-325 MG per tablet Take 1 tablet by mouth every 6 hours as needed.      Budeson-Glycopyrrol-Formoterol (BREZTRI AEROSPHERE) 160-9-4.8 MCG/ACT AERO       sucralfate (CARAFATE) 1 GM tablet Take 1 tablet by mouth 3 times daily 90 tablet 1    mupirocin (BACTROBAN) 2 % ointment       ondansetron (ZOFRAN) 4 MG tablet       metFORMIN (GLUCOPHAGE) 500 MG tablet Take 2 tablets by mouth 2 times daily      Accu-Chek Softclix Lancets MISC       ACCU-CHEK MAUREEN PLUS strip       Blood Glucose Calibration (ACCU-CHEK MAUREEN) SOLN       Alcohol Swabs (DROPSAFE ALCOHOL PREP) 70 % PADS       aspirin 81 MG EC

## 2025-08-25 RX ORDER — PANTOPRAZOLE SODIUM 40 MG/1
40 TABLET, DELAYED RELEASE ORAL DAILY
Qty: 90 TABLET | Refills: 1 | Status: SHIPPED | OUTPATIENT
Start: 2025-08-25

## (undated) DEVICE — SENSR O2 OXIMAX FNGR A/ 18IN NONSTR

## (undated) DEVICE — SNAR POLYP SENSATION MICRO OVL 13 240X40

## (undated) DEVICE — BITEBLOCK SCOPESAVER LG LF

## (undated) DEVICE — CUFF,BP,DISP,1 TUBE,ADULT,HP: Brand: MEDLINE

## (undated) DEVICE — TBG SMPL FLTR LINE NASL 02/C02 A/ BX/100

## (undated) DEVICE — ENDOGATOR AUXILIARY WATER JET CONNECTOR: Brand: ENDOGATOR

## (undated) DEVICE — MSK O2 CONCENTR/MD CONN/UNIV A/ 7FT DISP

## (undated) DEVICE — TP SXN YANKR W/VENT STRL

## (undated) DEVICE — MASK,OXYGEN,MED CONC,ADLT,7' TUB, UC: Brand: PENDING

## (undated) DEVICE — Device: Brand: DEFENDO AIR/WATER/SUCTION AND BIOPSY VALVE

## (undated) DEVICE — BRSH CLN CH 1.7MM 230CM 5TO7MM

## (undated) DEVICE — THE SINGLE USE ETRAP – POLYP TRAP IS USED FOR SUCTION RETRIEVAL OF ENDOSCOPICALLY REMOVED POLYPS.: Brand: ETRAP

## (undated) DEVICE — CUFF BP 1TB CONN/BAYO A/

## (undated) DEVICE — THE CHANNEL CLEANING BRUSH IS A NYLON FLEXI BRUSH ATTACHED TO A FLEXIBLE PLASTIC SHEATH DESIGNED TO SAFELY REMOVE DEBRIS FROM FLEXIBLE ENDOSCOPES.

## (undated) DEVICE — KT VLV BIOP DEFENDO SXN AIR/WATER